# Patient Record
Sex: FEMALE | Race: BLACK OR AFRICAN AMERICAN | Employment: FULL TIME | URBAN - METROPOLITAN AREA
[De-identification: names, ages, dates, MRNs, and addresses within clinical notes are randomized per-mention and may not be internally consistent; named-entity substitution may affect disease eponyms.]

---

## 2017-03-17 ENCOUNTER — HOSPITAL ENCOUNTER (EMERGENCY)
Age: 52
Discharge: HOME | End: 2017-03-17
Attending: EMERGENCY MEDICINE
Payer: MEDICARE

## 2017-03-17 VITALS
HEART RATE: 74 BPM | TEMPERATURE: 97.7 F | DIASTOLIC BLOOD PRESSURE: 100 MMHG | BODY MASS INDEX: 29.19 KG/M2 | SYSTOLIC BLOOD PRESSURE: 158 MMHG | HEIGHT: 65 IN | OXYGEN SATURATION: 100 % | WEIGHT: 175.19 LBS | RESPIRATION RATE: 18 BRPM

## 2017-03-17 DIAGNOSIS — K02.9 DENTAL CARIES: Primary | ICD-10-CM

## 2017-03-17 PROCEDURE — 99282 EMERGENCY DEPT VISIT SF MDM: CPT

## 2017-03-17 RX ORDER — LISINOPRIL 10 MG/1
10 TABLET ORAL DAILY
COMMUNITY
End: 2018-08-31 | Stop reason: ALTCHOICE

## 2017-03-17 RX ORDER — PENICILLIN V POTASSIUM 500 MG/1
500 TABLET ORAL 4 TIMES DAILY
Qty: 40 TABLET | Refills: 0 | Status: SHIPPED | OUTPATIENT
Start: 2017-03-17 | End: 2018-08-22

## 2017-03-17 RX ORDER — HYDROCODONE BITARTRATE AND ACETAMINOPHEN 5; 325 MG/1; MG/1
1 TABLET ORAL EVERY 8 HOURS PRN
Qty: 10 TABLET | Refills: 0 | Status: SHIPPED | OUTPATIENT
Start: 2017-03-17 | End: 2017-03-24

## 2017-03-17 RX ORDER — IBUPROFEN 600 MG/1
600 TABLET ORAL EVERY 8 HOURS PRN
Qty: 30 TABLET | Refills: 0 | Status: ON HOLD | OUTPATIENT
Start: 2017-03-17 | End: 2019-02-21 | Stop reason: ALTCHOICE

## 2017-03-17 ASSESSMENT — ENCOUNTER SYMPTOMS
GASTROINTESTINAL NEGATIVE: 1
EYES NEGATIVE: 1
RESPIRATORY NEGATIVE: 1
ALLERGIC/IMMUNOLOGIC NEGATIVE: 1

## 2017-03-17 ASSESSMENT — PAIN DESCRIPTION - ORIENTATION: ORIENTATION: LEFT;LOWER

## 2017-03-17 ASSESSMENT — PAIN SCALES - GENERAL: PAINLEVEL_OUTOF10: 8

## 2017-03-17 ASSESSMENT — PAIN DESCRIPTION - PAIN TYPE: TYPE: ACUTE PAIN

## 2017-03-17 ASSESSMENT — PAIN DESCRIPTION - LOCATION: LOCATION: HEAD;JAW

## 2017-03-17 ASSESSMENT — PAIN DESCRIPTION - DESCRIPTORS: DESCRIPTORS: THROBBING;ACHING

## 2018-01-28 ENCOUNTER — APPOINTMENT (OUTPATIENT)
Dept: GENERAL RADIOLOGY | Age: 53
End: 2018-01-28
Payer: COMMERCIAL

## 2018-01-28 ENCOUNTER — HOSPITAL ENCOUNTER (OUTPATIENT)
Age: 53
Setting detail: OBSERVATION
Discharge: HOME | End: 2018-01-29
Attending: EMERGENCY MEDICINE | Admitting: EMERGENCY MEDICINE
Payer: COMMERCIAL

## 2018-01-28 DIAGNOSIS — R19.7 NAUSEA VOMITING AND DIARRHEA: Primary | ICD-10-CM

## 2018-01-28 DIAGNOSIS — R55 SYNCOPE AND COLLAPSE: ICD-10-CM

## 2018-01-28 DIAGNOSIS — R11.2 NAUSEA VOMITING AND DIARRHEA: Primary | ICD-10-CM

## 2018-01-28 DIAGNOSIS — E83.39 HYPOPHOSPHATEMIA: ICD-10-CM

## 2018-01-28 LAB
ALBUMIN SERPL-MCNC: 4.2 G/DL (ref 3.5–5.2)
ALBUMIN/GLOBULIN RATIO: 1 (ref 1–2.5)
ALP BLD-CCNC: 143 U/L (ref 35–104)
ALT SERPL-CCNC: 25 U/L (ref 5–33)
ANION GAP SERPL CALCULATED.3IONS-SCNC: 13 MMOL/L (ref 9–17)
AST SERPL-CCNC: 28 U/L
BILIRUB SERPL-MCNC: 0.73 MG/DL (ref 0.3–1.2)
BUN BLDV-MCNC: 9 MG/DL (ref 6–20)
BUN/CREAT BLD: ABNORMAL (ref 9–20)
CALCIUM SERPL-MCNC: 10.2 MG/DL (ref 8.6–10.4)
CHLORIDE BLD-SCNC: 99 MMOL/L (ref 98–107)
CO2: 25 MMOL/L (ref 20–31)
CREAT SERPL-MCNC: 1.11 MG/DL (ref 0.5–0.9)
DIRECT EXAM: NORMAL
GFR AFRICAN AMERICAN: >60 ML/MIN
GFR NON-AFRICAN AMERICAN: 52 ML/MIN
GFR SERPL CREATININE-BSD FRML MDRD: ABNORMAL ML/MIN/{1.73_M2}
GFR SERPL CREATININE-BSD FRML MDRD: ABNORMAL ML/MIN/{1.73_M2}
GLUCOSE BLD-MCNC: 96 MG/DL (ref 70–99)
HCT VFR BLD CALC: 42.6 % (ref 36.3–47.1)
HEMOGLOBIN: 13.4 G/DL (ref 11.9–15.1)
Lab: NORMAL
MAGNESIUM: 1.9 MG/DL (ref 1.6–2.6)
MCH RBC QN AUTO: 29.4 PG (ref 25.2–33.5)
MCHC RBC AUTO-ENTMCNC: 31.5 G/DL (ref 28.4–34.8)
MCV RBC AUTO: 93.4 FL (ref 82.6–102.9)
NRBC AUTOMATED: 0 PER 100 WBC
PDW BLD-RTO: 11.1 % (ref 11.8–14.4)
PHOSPHORUS: 1.6 MG/DL (ref 2.6–4.5)
PLATELET # BLD: ABNORMAL K/UL (ref 138–453)
PLATELET, FLUORESCENCE: 126 K/UL (ref 138–453)
PLATELET, IMMATURE FRACTION: 11.8 % (ref 1.1–10.3)
PMV BLD AUTO: ABNORMAL FL (ref 8.1–13.5)
POC TROPONIN I: 0 NG/ML (ref 0–0.1)
POC TROPONIN INTERP: NORMAL
POTASSIUM SERPL-SCNC: 3.9 MMOL/L (ref 3.7–5.3)
RBC # BLD: 4.56 M/UL (ref 3.95–5.11)
SODIUM BLD-SCNC: 137 MMOL/L (ref 135–144)
SPECIMEN DESCRIPTION: NORMAL
STATUS: NORMAL
TOTAL PROTEIN: 8.3 G/DL (ref 6.4–8.3)
TSH SERPL DL<=0.05 MIU/L-ACNC: 0.38 MIU/L (ref 0.3–5)
WBC # BLD: 4.6 K/UL (ref 3.5–11.3)

## 2018-01-28 PROCEDURE — 71046 X-RAY EXAM CHEST 2 VIEWS: CPT

## 2018-01-28 PROCEDURE — 6360000002 HC RX W HCPCS: Performed by: EMERGENCY MEDICINE

## 2018-01-28 PROCEDURE — 85055 RETICULATED PLATELET ASSAY: CPT

## 2018-01-28 PROCEDURE — 2500000003 HC RX 250 WO HCPCS: Performed by: EMERGENCY MEDICINE

## 2018-01-28 PROCEDURE — 87804 INFLUENZA ASSAY W/OPTIC: CPT

## 2018-01-28 PROCEDURE — 83735 ASSAY OF MAGNESIUM: CPT

## 2018-01-28 PROCEDURE — 6370000000 HC RX 637 (ALT 250 FOR IP): Performed by: EMERGENCY MEDICINE

## 2018-01-28 PROCEDURE — 84484 ASSAY OF TROPONIN QUANT: CPT

## 2018-01-28 PROCEDURE — 96365 THER/PROPH/DIAG IV INF INIT: CPT

## 2018-01-28 PROCEDURE — G0378 HOSPITAL OBSERVATION PER HR: HCPCS

## 2018-01-28 PROCEDURE — 84443 ASSAY THYROID STIM HORMONE: CPT

## 2018-01-28 PROCEDURE — 2580000003 HC RX 258: Performed by: EMERGENCY MEDICINE

## 2018-01-28 PROCEDURE — 96375 TX/PRO/DX INJ NEW DRUG ADDON: CPT

## 2018-01-28 PROCEDURE — 85027 COMPLETE CBC AUTOMATED: CPT

## 2018-01-28 PROCEDURE — 84100 ASSAY OF PHOSPHORUS: CPT

## 2018-01-28 PROCEDURE — 94770 HC ETCO2 MONITOR DAILY: CPT

## 2018-01-28 PROCEDURE — 93005 ELECTROCARDIOGRAM TRACING: CPT

## 2018-01-28 PROCEDURE — 96367 TX/PROPH/DG ADDL SEQ IV INF: CPT

## 2018-01-28 PROCEDURE — 99285 EMERGENCY DEPT VISIT HI MDM: CPT

## 2018-01-28 PROCEDURE — 80053 COMPREHEN METABOLIC PANEL: CPT

## 2018-01-28 PROCEDURE — 96366 THER/PROPH/DIAG IV INF ADDON: CPT

## 2018-01-28 RX ORDER — SODIUM CHLORIDE 0.9 % (FLUSH) 0.9 %
10 SYRINGE (ML) INJECTION PRN
Status: DISCONTINUED | OUTPATIENT
Start: 2018-01-28 | End: 2018-01-29 | Stop reason: HOSPADM

## 2018-01-28 RX ORDER — 0.9 % SODIUM CHLORIDE 0.9 %
1000 INTRAVENOUS SOLUTION INTRAVENOUS ONCE
Status: COMPLETED | OUTPATIENT
Start: 2018-01-28 | End: 2018-01-28

## 2018-01-28 RX ORDER — AMLODIPINE BESYLATE 5 MG/1
5 TABLET ORAL 2 TIMES DAILY
Status: DISCONTINUED | OUTPATIENT
Start: 2018-01-28 | End: 2018-01-29 | Stop reason: HOSPADM

## 2018-01-28 RX ORDER — SODIUM CHLORIDE 9 MG/ML
INJECTION, SOLUTION INTRAVENOUS CONTINUOUS
Status: DISCONTINUED | OUTPATIENT
Start: 2018-01-28 | End: 2018-01-29 | Stop reason: HOSPADM

## 2018-01-28 RX ORDER — ONDANSETRON 2 MG/ML
4 INJECTION INTRAMUSCULAR; INTRAVENOUS ONCE
Status: COMPLETED | OUTPATIENT
Start: 2018-01-28 | End: 2018-01-28

## 2018-01-28 RX ORDER — BUTALBITAL, ACETAMINOPHEN AND CAFFEINE 50; 325; 40 MG/1; MG/1; MG/1
1 TABLET ORAL ONCE
Status: COMPLETED | OUTPATIENT
Start: 2018-01-28 | End: 2018-01-28

## 2018-01-28 RX ORDER — ACETAMINOPHEN 325 MG/1
650 TABLET ORAL EVERY 4 HOURS PRN
Status: DISCONTINUED | OUTPATIENT
Start: 2018-01-28 | End: 2018-01-29 | Stop reason: HOSPADM

## 2018-01-28 RX ORDER — LISINOPRIL 10 MG/1
10 TABLET ORAL DAILY
Status: DISCONTINUED | OUTPATIENT
Start: 2018-01-28 | End: 2018-01-29 | Stop reason: HOSPADM

## 2018-01-28 RX ORDER — SODIUM CHLORIDE 0.9 % (FLUSH) 0.9 %
10 SYRINGE (ML) INJECTION EVERY 12 HOURS SCHEDULED
Status: DISCONTINUED | OUTPATIENT
Start: 2018-01-28 | End: 2018-01-29 | Stop reason: HOSPADM

## 2018-01-28 RX ORDER — BUTALBITAL, ACETAMINOPHEN AND CAFFEINE 50; 325; 40 MG/1; MG/1; MG/1
1 TABLET ORAL EVERY 6 HOURS PRN
Status: DISCONTINUED | OUTPATIENT
Start: 2018-01-28 | End: 2018-01-29 | Stop reason: HOSPADM

## 2018-01-28 RX ORDER — ONDANSETRON 2 MG/ML
4 INJECTION INTRAMUSCULAR; INTRAVENOUS EVERY 8 HOURS PRN
Status: DISCONTINUED | OUTPATIENT
Start: 2018-01-28 | End: 2018-01-29 | Stop reason: HOSPADM

## 2018-01-28 RX ORDER — MAGNESIUM SULFATE 1 G/100ML
2 INJECTION INTRAVENOUS
Status: DISPENSED | OUTPATIENT
Start: 2018-01-28 | End: 2018-01-28

## 2018-01-28 RX ADMIN — BUTALBITAL, ACETAMINOPHEN, AND CAFFEINE 1 TABLET: 50; 325; 40 TABLET ORAL at 17:57

## 2018-01-28 RX ADMIN — SODIUM CHLORIDE, PRESERVATIVE FREE 10 ML: 5 INJECTION INTRAVENOUS at 21:30

## 2018-01-28 RX ADMIN — MAGNESIUM SULFATE HEPTAHYDRATE 2 G: 1 INJECTION, SOLUTION INTRAVENOUS at 16:57

## 2018-01-28 RX ADMIN — ONDANSETRON 4 MG: 2 INJECTION, SOLUTION INTRAMUSCULAR; INTRAVENOUS at 16:58

## 2018-01-28 RX ADMIN — LISINOPRIL 10 MG: 10 TABLET ORAL at 20:19

## 2018-01-28 RX ADMIN — AMLODIPINE BESYLATE 5 MG: 5 TABLET ORAL at 20:19

## 2018-01-28 RX ADMIN — SODIUM CHLORIDE: 9 INJECTION, SOLUTION INTRAVENOUS at 21:49

## 2018-01-28 RX ADMIN — SODIUM CHLORIDE 1000 ML: 9 INJECTION, SOLUTION INTRAVENOUS at 16:52

## 2018-01-28 RX ADMIN — POTASSIUM PHOSPHATE, MONOBASIC AND POTASSIUM PHOSPHATE, DIBASIC 40 MMOL: 224; 236 INJECTION, SOLUTION, CONCENTRATE INTRAVENOUS at 17:41

## 2018-01-28 RX ADMIN — ACETAMINOPHEN 650 MG: 325 TABLET ORAL at 20:18

## 2018-01-28 ASSESSMENT — ENCOUNTER SYMPTOMS
DIARRHEA: 1
BACK PAIN: 0
SHORTNESS OF BREATH: 0
COUGH: 0
RHINORRHEA: 0
NAUSEA: 1
SORE THROAT: 0
VOMITING: 1
ABDOMINAL PAIN: 0
BLOOD IN STOOL: 0

## 2018-01-28 ASSESSMENT — PAIN SCALES - GENERAL
PAINLEVEL_OUTOF10: 8
PAINLEVEL_OUTOF10: 6
PAINLEVEL_OUTOF10: 6

## 2018-01-28 ASSESSMENT — PAIN DESCRIPTION - PAIN TYPE: TYPE: ACUTE PAIN

## 2018-01-28 ASSESSMENT — PAIN DESCRIPTION - DESCRIPTORS: DESCRIPTORS: HEADACHE

## 2018-01-28 ASSESSMENT — PAIN DESCRIPTION - ORIENTATION: ORIENTATION: LOWER

## 2018-01-28 ASSESSMENT — PAIN DESCRIPTION - LOCATION: LOCATION: BACK;HEAD

## 2018-01-28 NOTE — ED PROVIDER NOTES
Cholecystectomy and Lithotripsy. Social History     Social History    Marital status: Single     Spouse name: N/A    Number of children: N/A    Years of education: N/A     Occupational History    Not on file. Social History Main Topics    Smoking status: Never Smoker    Smokeless tobacco: Not on file    Alcohol use No    Drug use: No    Sexual activity: Not on file     Other Topics Concern    Not on file     Social History Narrative    No narrative on file       History reviewed. No pertinent family history. Allergies:  Sulfa antibiotics    Home Medications:  Prior to Admission medications    Medication Sig Start Date End Date Taking? Authorizing Provider   lisinopril (PRINIVIL;ZESTRIL) 10 MG tablet Take 10 mg by mouth daily Unsure of dose    Historical Provider, MD   penicillin v potassium (VEETID) 500 MG tablet Take 1 tablet by mouth 4 times daily 3/17/17   Atif Sparks MD   ibuprofen (ADVIL;MOTRIN) 600 MG tablet Take 1 tablet by mouth every 8 hours as needed for Pain 3/17/17   Atif Sparks MD   butalbital-acetaminophen-caffeine (FIORICET) -64 MG per tablet Take 1 tablet by mouth every 6 hours as needed for Headaches 12/24/16   Reid Grayson MD   nitrofurantoin, macrocrystal-monohydrate, (MACROBID) 100 MG capsule Take 1 capsule by mouth 2 times daily 12/24/16   Reid Grayson MD   amLODIPine (NORVASC) 5 MG tablet Take 5 mg by mouth 2 times daily. Historical Provider, MD   fluticasone-salmeterol (ADVAIR) 250-50 MCG/DOSE AEPB Inhale 1 puff into the lungs daily. Historical Provider, MD   ondansetron (ZOFRAN ODT) 4 MG disintegrating tablet Take 1 tablet by mouth every 8 hours as needed for Nausea. 8/4/14   Kelley Reinoso MD   dicyclomine (BENTYL) 10 MG capsule Take 1 capsule by mouth every 6 hours as needed (cramps).  8/4/14   Kelley Reinoso MD       REVIEW OF SYSTEMS    (2-9 systems for level 4, 10 or more for level 5)      Review of Systems   Constitutional: Positive for chills and fever. HENT: Negative for congestion, rhinorrhea and sore throat. Respiratory: Negative for cough and shortness of breath. Cardiovascular: Negative for chest pain and leg swelling. Gastrointestinal: Positive for diarrhea, nausea and vomiting. Negative for abdominal pain and blood in stool. Genitourinary: Negative for difficulty urinating, dysuria and hematuria. Musculoskeletal: Positive for myalgias. Negative for back pain and neck pain. Skin: Negative for rash. Neurological: Positive for syncope and light-headedness. Negative for dizziness, seizures, weakness, numbness and headaches. Psychiatric/Behavioral: Negative for agitation and confusion. PHYSICAL EXAM   (up to 7 for level 4, 8 or more for level 5)      INITIAL VITALS:   BP (!) 149/97   Pulse 100   Temp 100.1 °F (37.8 °C) (Oral)   Resp 22   Ht 5' 5\" (1.651 m)   Wt 169 lb (76.7 kg)   SpO2 100%   BMI 28.12 kg/m²     Physical Exam   Constitutional: She is oriented to person, place, and time. She appears well-developed and well-nourished. HENT:   Head: Normocephalic and atraumatic. Eyes: Pupils are equal, round, and reactive to light. Neck: Normal range of motion. Neck supple. No carotid bruit. Cardiovascular: Normal rate, regular rhythm, normal heart sounds and intact distal pulses. No murmur heard. Pulmonary/Chest: Effort normal and breath sounds normal. No respiratory distress. She has no wheezes. She has no rales. She exhibits no tenderness. Abdominal: Soft. She exhibits no distension. There is no tenderness. There is no rebound and no guarding. Abdomen soft, nondistended, nontender. No pulsatile masses palpated. Musculoskeletal: Normal range of motion. She exhibits no edema or tenderness. Neurological: She is alert and oriented to person, place, and time. Alert and oriented x4. Cranial nerves II through XII grossly intact bilaterally.   Muscle strength 5 out of 5 bilateral upper and

## 2018-01-28 NOTE — ED NOTES
Pt transported to [facility], [address] [place] [address]  01/28/18 1588

## 2018-01-28 NOTE — ED NOTES
Pt to ER with c/o N/V/D/fever/chills x 3 days. Pt febrile upon arrival, took Tylenol at 0900 this morning. Pt reports generalized body aches and pain, pain worse in lower back. Pt stated she had a syncopal episode early this morning while vomiting in her bathroom. Denies hitting her head, states she was already lying on the floor. Pt unsure how long she was down, found by son. Placed on full cardiac monitor, no distress noted, rr even and NL. Dr. Danny Caldera at bedside for eval. Will continue to monitor.      Lori Tatum RN  01/28/18 5992

## 2018-01-28 NOTE — ED NOTES
Pt updated with test results and decision to admit. Awaiting room assignment.  Will continue to monitor     Mike Son RN  01/28/18 0273

## 2018-01-29 VITALS
WEIGHT: 171.3 LBS | OXYGEN SATURATION: 97 % | HEART RATE: 80 BPM | DIASTOLIC BLOOD PRESSURE: 76 MMHG | HEIGHT: 65 IN | SYSTOLIC BLOOD PRESSURE: 118 MMHG | BODY MASS INDEX: 28.54 KG/M2 | RESPIRATION RATE: 18 BRPM | TEMPERATURE: 99.7 F

## 2018-01-29 LAB
-: NORMAL
AMORPHOUS: NORMAL
BACTERIA: NORMAL
BILIRUBIN URINE: NEGATIVE
CASTS UA: NORMAL /LPF (ref 0–8)
COLOR: YELLOW
COMMENT UA: ABNORMAL
CRYSTALS, UA: NORMAL /HPF
EKG ATRIAL RATE: 77 BPM
EKG ATRIAL RATE: 87 BPM
EKG ATRIAL RATE: 97 BPM
EKG P AXIS: 47 DEGREES
EKG P AXIS: 59 DEGREES
EKG P AXIS: 59 DEGREES
EKG P-R INTERVAL: 160 MS
EKG P-R INTERVAL: 170 MS
EKG P-R INTERVAL: 172 MS
EKG Q-T INTERVAL: 328 MS
EKG Q-T INTERVAL: 380 MS
EKG Q-T INTERVAL: 398 MS
EKG QRS DURATION: 84 MS
EKG QRS DURATION: 86 MS
EKG QRS DURATION: 86 MS
EKG QTC CALCULATION (BAZETT): 394 MS
EKG QTC CALCULATION (BAZETT): 430 MS
EKG QTC CALCULATION (BAZETT): 505 MS
EKG R AXIS: 17 DEGREES
EKG R AXIS: 22 DEGREES
EKG R AXIS: 7 DEGREES
EKG T AXIS: 25 DEGREES
EKG T AXIS: 36 DEGREES
EKG T AXIS: 8 DEGREES
EKG VENTRICULAR RATE: 77 BPM
EKG VENTRICULAR RATE: 87 BPM
EKG VENTRICULAR RATE: 97 BPM
EPITHELIAL CELLS UA: NORMAL /HPF (ref 0–5)
GLUCOSE URINE: NEGATIVE
KETONES, URINE: NEGATIVE
LEUKOCYTE ESTERASE, URINE: NEGATIVE
LV EF: 55 %
LVEF MODALITY: NORMAL
MUCUS: NORMAL
NITRITE, URINE: NEGATIVE
OTHER OBSERVATIONS UA: NORMAL
PH UA: 7 (ref 5–8)
PROTEIN UA: ABNORMAL
RBC UA: NORMAL /HPF (ref 0–4)
RENAL EPITHELIAL, UA: NORMAL /HPF
SPECIFIC GRAVITY UA: 1.01 (ref 1–1.03)
TRICHOMONAS: NORMAL
TURBIDITY: CLEAR
URINE HGB: NEGATIVE
UROBILINOGEN, URINE: NORMAL
WBC UA: NORMAL /HPF (ref 0–5)
YEAST: NORMAL

## 2018-01-29 PROCEDURE — 93306 TTE W/DOPPLER COMPLETE: CPT

## 2018-01-29 PROCEDURE — 81001 URINALYSIS AUTO W/SCOPE: CPT

## 2018-01-29 PROCEDURE — G0378 HOSPITAL OBSERVATION PER HR: HCPCS

## 2018-01-29 PROCEDURE — 93005 ELECTROCARDIOGRAM TRACING: CPT

## 2018-01-29 PROCEDURE — 6370000000 HC RX 637 (ALT 250 FOR IP): Performed by: EMERGENCY MEDICINE

## 2018-01-29 PROCEDURE — 94762 N-INVAS EAR/PLS OXIMTRY CONT: CPT

## 2018-01-29 RX ORDER — ONDANSETRON 4 MG/1
4 TABLET, ORALLY DISINTEGRATING ORAL EVERY 8 HOURS PRN
Qty: 20 TABLET | Refills: 0 | Status: SHIPPED | OUTPATIENT
Start: 2018-01-29 | End: 2018-08-22

## 2018-01-29 RX ORDER — ACETAMINOPHEN 325 MG/1
650 TABLET ORAL EVERY 6 HOURS PRN
Qty: 120 TABLET | Refills: 0 | Status: SHIPPED | OUTPATIENT
Start: 2018-01-29 | End: 2018-08-22

## 2018-01-29 RX ADMIN — AMLODIPINE BESYLATE 5 MG: 5 TABLET ORAL at 11:14

## 2018-01-29 RX ADMIN — LISINOPRIL 10 MG: 10 TABLET ORAL at 11:15

## 2018-01-29 ASSESSMENT — PAIN SCALES - GENERAL: PAINLEVEL_OUTOF10: 0

## 2018-01-29 NOTE — H&P
[facility]  CDU / OBSERVATION eNCOUnter  Resident Note     Pt Name: Marychuy Barrett  MRN: [id number]  [place] 1965  Date of evaluation: 1/29/18  Patient's PCP is :  Sindy Nice MD    CHIEF COMPLAINT       Chief Complaint   Patient presents with    Loss of Consciousness     N/V/D x 3 days with generalized body aches and pain. Pt reports fever/chills. Pt febrile upon arrival, took Tylenol this morning at 0900. Pt reports passing out while in the bathroom early this morning while vomiting. Pt denies hitting her head, states she was already on the floor. Unsure of how long she was down, found by her son.  Emesis    Diarrhea         HISTORY OF PRESENT ILLNESS    Marychuy Barrett is a 47 y.o. female who presents with acute nausea and vomiting most likely etiology gastroenteritis versus influenza as well as acute loss of consciousness most likely etiology dehydration versus vasovagal syncope. Patient presenting with acute nausea, vomiting, diarrhea and loss of consciousness but has been going on over the past few days. Patient states the emesis and diarrhea are both nonbloody, complaining of fever and chills as well as generalized body aches. Denies any chest pain or shortness of breath. She states she was in the bathroom when she passed out however she was on the floor prior to fainting and states she did not hit her head. Patient had workup in the emergency department consisting of CBC, CMP, EKG, chest x-ray, urinalysis, TSH and was admitted to observation unit for cardiology consultation. Patient stating she is feeling much better today with decreased nausea no vomiting. States her achiness has improved greatly.     Location/Symptom: nausea and vomiting  Timing/Onset: acute  Provocation: none  Quality: nauseous  Radiation: none  Severity: moderate  Timing/Duration: past few days    Modifying Factors: none    REVIEW OF SYSTEMS       General ROS - No fevers, positive malaise she has never smoked. She does not have any smokeless tobacco history on file. She reports that she does not drink alcohol or use drugs. I have reviewed and agree with all Social.  There are no concerns for substance abuse/use. PHYSICAL EXAM     INITIAL VITALS:  height is 5' 5\" (1.651 m) and weight is 171 lb 4.8 oz (77.7 kg). Her oral temperature is 99.7 °F (37.6 °C). Her blood pressure is 118/76 and her pulse is 80. Her respiration is 18 and oxygen saturation is 97%. CONSTITUTIONAL: AOx4, no apparent distress, appears stated age    HEAD: normocephalic, atraumatic   EYES: PERRLA, EOMI    ENT: moist mucous membranes, uvula midline   NECK: supple, symmetric   BACK: symmetric   LUNGS: clear to auscultation bilaterally   CARDIOVASCULAR: regular rate and rhythm, no murmurs, rubs or gallops   ABDOMEN: soft, non-tender, non-distended with normal active bowel sounds   NEUROLOGIC:  MAEx4, no focal sensory or motor deficits   MUSCULOSKELETAL: no clubbing, cyanosis or edema   SKIN: no rash or wounds       DIFFERENTIAL DIAGNOSIS/MDM:     Small bowel obstruction, DKA, Abdominal (gastroenteritis, appendicitis, gallbladder, pancreatitis, PUD, perforation), ICH, meningitis, vertigo, hyperemesis, abnormal lytes, EtOH intoxication/ tox, post-tussive, ACS/ MI    Evaluate for: blood glucose, bloody v bilious, PO tolerant, hydration status, headache, abdominal pain, vertigo, alcohol intoxication, other intoxication, new medications, no signs of head trauma. Reassess for tolerating PO      DIAGNOSTIC RESULTS     EKG: All EKG's are interpreted by the Observation Physician who either signs or Co-signs this chart in the absence of a cardiologist.    EKG Interpretation    Interpreted by observation physician    Rhythm: normal sinus   Rate: normal  Axis: normal  Ectopy: none  Conduction: QTC prolongation  ST Segments: no acute change  T Waves:  Inversion in V3, V4, V5, V6  Q Waves: none     Clinical Impression: T wave inversion in the anterior lateral leads, QTc prolongation     Isidoro Landa MD        RADIOLOGY:   I directly visualized the following  images and reviewed the radiologist interpretations:    Xr Chest Standard (2 Vw)    Result Date: 1/28/2018  EXAMINATION: TWO VIEWS OF THE CHEST 1/28/2018 5:10 pm COMPARISON: 08/28/2014 HISTORY: ORDERING SYSTEM PROVIDED HISTORY: syncope TECHNOLOGIST PROVIDED HISTORY: Reason for exam:->syncope Ordering Physician Provided Reason for Exam: syncope Acuity: Unknown Type of Exam: Unknown FINDINGS: The lungs are without acute focal process. There is no effusion or pneumothorax. The cardiomediastinal silhouette is stable. The osseous structures are stable. No acute process. LABS:  I have reviewed and interpreted all available lab results. Labs Reviewed   CBC - Abnormal; Notable for the following:        Result Value    RDW 11.1 (*)     All other components within normal limits   COMPREHENSIVE METABOLIC PANEL - Abnormal; Notable for the following:     CREATININE 1.11 (*)     Alkaline Phosphatase 143 (*)     GFR Non- 52 (*)     All other components within normal limits   PHOSPHORUS - Abnormal; Notable for the following:     Phosphorus 1.6 (*)     All other components within normal limits   URINALYSIS - Abnormal; Notable for the following:     Protein, UA 2+ (*)     All other components within normal limits   IMMATURE PLATELET FRACTION - Abnormal; Notable for the following:     Platelet, Immature Fraction 11.8 (*)     Platelet, Fluorescence 126 (*)     All other components within normal limits   RAPID INFLUENZA A/B ANTIGENS   MAGNESIUM   TSH WITH REFLEX   MICROSCOPIC URINALYSIS   POCT TROPONIN   POCT TROPONIN   POCT TROPONIN           SCREENING TOOLS:      CDU IMPRESSION       Trina Knowles is a 47 y.o. female who presents with    1. Acute nausea and vomiting and diarrhea most likely etiology gastroenteritis versus influenza  2.  Acute loss of consciousness most likely etiology dehydration versus vasovagal syncope         CDU PLAN     · Acute nausea, vomiting, diarrhea: IV fluids, antiemetics, patient stating she is feeling much better today without any vomiting and she was able to tolerate by mouth intake  · Acute loss of consciousness: Cardiology recommending echocardiogram  · Discharge today if negative echocardiogram and cardiology clearance  IP CONSULT TO CARDIOLOGY  · Further workup and evaluation   · Follow up recommendations     · Continue home meds, pain control   · Monitor vitals, labs, imaging         CONSULTS:    IP CONSULT TO CARDIOLOGY    PROCEDURES:  Not indicated       PATIENT REFERRED TO:    Chery Ruelas, [address] [place] [id number] [phone]    Schedule an appointment as soon as possible for a visit in 3 days      Calin Flores, [facility]  [phone]            --  Breana Gee MD   Emergency Medicine Resident     This dictation was generated by voice recognition computer software. Although all attempts are made to edit the dictation for accuracy, there may be errors in the transcription that are not intended.

## 2018-01-29 NOTE — CONSULTS
mucosa  Respiratory:  · Normal excursion and expansion without use of accessory muscles  · Resp Auscultation: Good respiratory effort. No for increased work of breathing. On auscultation: clear  Cardiovascular:  · The apical impulse is not displaced  · Heart tones are crisp and normal. regular S1 and S2. Murmurs: none  · Jugular venous pulsation Normal  · The carotid upstroke is normal in amplitude and contour without delay or bruit  · Peripheral pulses are symmetrical and full   Abdomen:  · No masses or tenderness  · Bowel sounds present  Extremities:  ·  No Cyanosis or Clubbing  ·  Lower extremity edema: none  ·  Skin: Warm and dry  Neurological:  · Alert and oriented. · Moves all extremities well  · No abnormalities of mood, affect, memory, mentation, or behavior are noted    DATA:    Diagnostics:      EKG: normal sinus no acute ST-T changes there was some nonspecific changes seen      Labs:     CBC:   Recent Labs      01/28/18   1618   WBC  4.6   HGB  13.4   HCT  42.6   PLT  See Reflexed IPF Result     BMP:   Recent Labs      01/28/18   1618   NA  137   K  3.9   CO2  25   BUN  9   CREATININE  1.11*   LABGLOM  52*   GLUCOSE  96     BNP: No results for input(s): BNP in the last 72 hours. PT/INR: No results for input(s): PROTIME, INR in the last 72 hours. APTT:No results for input(s): APTT in the last 72 hours. CARDIAC ENZYMES:  Recent Labs      01/28/18   1614   TROPONINI  0.00      FASTING LIPID PANEL:No results found for: HDL, LDLDIRECT, LDLCALC, TRIG  LIVER PROFILE:  Recent Labs      01/28/18   1618   AST  28   ALT  25   LABALBU  4.2         IMPRESSION:    Patient Active Problem List   Diagnosis    Calculus (=stone)    HTN (hypertension)    Asthma    Syncope and collapse     - syncope likely vasovagal due to recurrent vomiting  - recurrent nausea and vomiting has improved.   Workup per primary  - History of hypertension, asthma    RECOMMENDATIONS:  - we will check 2-D echo to advise for any structural

## 2018-04-09 ENCOUNTER — APPOINTMENT (OUTPATIENT)
Dept: GENERAL RADIOLOGY | Age: 53
End: 2018-04-09
Payer: MEDICARE

## 2018-04-09 ENCOUNTER — HOSPITAL ENCOUNTER (EMERGENCY)
Age: 53
Discharge: HOME | End: 2018-04-09
Attending: EMERGENCY MEDICINE
Payer: MEDICARE

## 2018-04-09 VITALS
HEART RATE: 110 BPM | TEMPERATURE: 99.3 F | RESPIRATION RATE: 18 BRPM | SYSTOLIC BLOOD PRESSURE: 157 MMHG | DIASTOLIC BLOOD PRESSURE: 101 MMHG | BODY MASS INDEX: 28.16 KG/M2 | OXYGEN SATURATION: 98 % | WEIGHT: 169 LBS | HEIGHT: 65 IN

## 2018-04-09 DIAGNOSIS — J06.9 ACUTE UPPER RESPIRATORY INFECTION: Primary | ICD-10-CM

## 2018-04-09 LAB
DIRECT EXAM: NORMAL
Lab: NORMAL
SPECIMEN DESCRIPTION: NORMAL
STATUS: NORMAL

## 2018-04-09 PROCEDURE — 99285 EMERGENCY DEPT VISIT HI MDM: CPT

## 2018-04-09 PROCEDURE — 87804 INFLUENZA ASSAY W/OPTIC: CPT

## 2018-04-09 PROCEDURE — 6360000002 HC RX W HCPCS: Performed by: RADIOLOGY

## 2018-04-09 PROCEDURE — 71046 X-RAY EXAM CHEST 2 VIEWS: CPT

## 2018-04-09 PROCEDURE — 94640 AIRWAY INHALATION TREATMENT: CPT

## 2018-04-09 RX ORDER — ALBUTEROL SULFATE 90 UG/1
2 AEROSOL, METERED RESPIRATORY (INHALATION) EVERY 6 HOURS PRN
Qty: 1 INHALER | Refills: 3 | Status: SHIPPED | OUTPATIENT
Start: 2018-04-09 | End: 2018-08-31 | Stop reason: ALTCHOICE

## 2018-04-09 RX ORDER — PREDNISONE 20 MG/1
40 TABLET ORAL DAILY
Qty: 10 TABLET | Refills: 0 | Status: SHIPPED | OUTPATIENT
Start: 2018-04-09 | End: 2018-04-14

## 2018-04-09 RX ORDER — ALBUTEROL SULFATE 2.5 MG/3ML
2.5 SOLUTION RESPIRATORY (INHALATION) EVERY 6 HOURS PRN
Status: DISCONTINUED | OUTPATIENT
Start: 2018-04-09 | End: 2018-04-09 | Stop reason: HOSPADM

## 2018-04-09 RX ADMIN — ALBUTEROL SULFATE 2.5 MG: 2.5 SOLUTION RESPIRATORY (INHALATION) at 17:57

## 2018-04-09 ASSESSMENT — ENCOUNTER SYMPTOMS
ABDOMINAL PAIN: 0
SHORTNESS OF BREATH: 1
EYE DISCHARGE: 0
DIARRHEA: 0
VOMITING: 0
CONSTIPATION: 0
SINUS PRESSURE: 1
PHOTOPHOBIA: 0
ABDOMINAL DISTENTION: 0
SORE THROAT: 1
NAUSEA: 1
RHINORRHEA: 1
TROUBLE SWALLOWING: 0
COUGH: 1
WHEEZING: 0

## 2018-04-09 ASSESSMENT — PAIN DESCRIPTION - LOCATION: LOCATION: CHEST

## 2018-04-09 ASSESSMENT — PAIN DESCRIPTION - PAIN TYPE: TYPE: ACUTE PAIN

## 2018-04-09 ASSESSMENT — PAIN SCALES - GENERAL: PAINLEVEL_OUTOF10: 3

## 2018-07-26 PROBLEM — Z12.11 COLON CANCER SCREENING: Status: ACTIVE | Noted: 2018-03-13

## 2018-08-22 ENCOUNTER — APPOINTMENT (OUTPATIENT)
Dept: GENERAL RADIOLOGY | Age: 53
DRG: 046 | End: 2018-08-22
Payer: MEDICARE

## 2018-08-22 ENCOUNTER — APPOINTMENT (OUTPATIENT)
Dept: CT IMAGING | Age: 53
DRG: 046 | End: 2018-08-22
Payer: MEDICARE

## 2018-08-22 ENCOUNTER — HOSPITAL ENCOUNTER (INPATIENT)
Age: 53
LOS: 1 days | Discharge: HOME | DRG: 046 | End: 2018-08-23
Attending: EMERGENCY MEDICINE | Admitting: PSYCHIATRY & NEUROLOGY
Payer: MEDICARE

## 2018-08-22 ENCOUNTER — APPOINTMENT (OUTPATIENT)
Dept: MRI IMAGING | Age: 53
DRG: 046 | End: 2018-08-22
Payer: MEDICARE

## 2018-08-22 DIAGNOSIS — R20.0 NUMBNESS: Primary | ICD-10-CM

## 2018-08-22 PROBLEM — I63.9 ISCHEMIC STROKE (HCC): Status: ACTIVE | Noted: 2018-08-22

## 2018-08-22 LAB
% CKMB: 1.1 % (ref 0–3)
ABSOLUTE EOS #: 0.1 K/UL (ref 0–0.44)
ABSOLUTE IMMATURE GRANULOCYTE: <0.03 K/UL (ref 0–0.3)
ABSOLUTE LYMPH #: 1.83 K/UL (ref 1.1–3.7)
ABSOLUTE MONO #: 0.35 K/UL (ref 0.1–1.2)
ANION GAP SERPL CALCULATED.3IONS-SCNC: 14 MMOL/L (ref 9–17)
BASOPHILS # BLD: 1 % (ref 0–2)
BASOPHILS ABSOLUTE: 0.05 K/UL (ref 0–0.2)
BUN BLDV-MCNC: 9 MG/DL (ref 6–20)
BUN/CREAT BLD: ABNORMAL (ref 9–20)
CALCIUM SERPL-MCNC: 10 MG/DL (ref 8.6–10.4)
CHLORIDE BLD-SCNC: 104 MMOL/L (ref 98–107)
CK MB: <1 NG/ML
CKMB INTERPRETATION: ABNORMAL
CO2: 21 MMOL/L (ref 20–31)
CREAT SERPL-MCNC: 1.13 MG/DL (ref 0.5–0.9)
DIFFERENTIAL TYPE: ABNORMAL
EKG ATRIAL RATE: 72 BPM
EKG P AXIS: 47 DEGREES
EKG P-R INTERVAL: 180 MS
EKG Q-T INTERVAL: 380 MS
EKG QRS DURATION: 86 MS
EKG QTC CALCULATION (BAZETT): 416 MS
EKG R AXIS: -6 DEGREES
EKG T AXIS: -17 DEGREES
EKG VENTRICULAR RATE: 72 BPM
EOSINOPHILS RELATIVE PERCENT: 2 % (ref 1–4)
ESTIMATED AVERAGE GLUCOSE: 91 MG/DL
GFR AFRICAN AMERICAN: >60 ML/MIN
GFR NON-AFRICAN AMERICAN: 51 ML/MIN
GFR SERPL CREATININE-BSD FRML MDRD: ABNORMAL ML/MIN/{1.73_M2}
GFR SERPL CREATININE-BSD FRML MDRD: ABNORMAL ML/MIN/{1.73_M2}
GLUCOSE BLD-MCNC: 115 MG/DL (ref 70–99)
HBA1C MFR BLD: 4.8 % (ref 4–6)
HCT VFR BLD CALC: 39.7 % (ref 36.3–47.1)
HEMOGLOBIN: 12.6 G/DL (ref 11.9–15.1)
IMMATURE GRANULOCYTES: 0 %
INR BLD: 1
LYMPHOCYTES # BLD: 44 % (ref 24–43)
MCH RBC QN AUTO: 29 PG (ref 25.2–33.5)
MCHC RBC AUTO-ENTMCNC: 31.7 G/DL (ref 28.4–34.8)
MCV RBC AUTO: 91.5 FL (ref 82.6–102.9)
MONOCYTES # BLD: 9 % (ref 3–12)
MYOGLOBIN: 29 NG/ML (ref 25–58)
NRBC AUTOMATED: 0 PER 100 WBC
PARTIAL THROMBOPLASTIN TIME: 26.5 SEC (ref 20.5–30.5)
PDW BLD-RTO: 11.4 % (ref 11.8–14.4)
PLATELET # BLD: 183 K/UL (ref 138–453)
PLATELET ESTIMATE: ABNORMAL
PMV BLD AUTO: 12.6 FL (ref 8.1–13.5)
POC TROPONIN I: 0 NG/ML (ref 0–0.1)
POC TROPONIN I: 0 NG/ML (ref 0–0.1)
POC TROPONIN INTERP: NORMAL
POC TROPONIN INTERP: NORMAL
POTASSIUM SERPL-SCNC: 3.6 MMOL/L (ref 3.7–5.3)
PROTHROMBIN TIME: 10.3 SEC (ref 9–12)
RBC # BLD: 4.34 M/UL (ref 3.95–5.11)
RBC # BLD: ABNORMAL 10*6/UL
SEG NEUTROPHILS: 44 % (ref 36–65)
SEGMENTED NEUTROPHILS ABSOLUTE COUNT: 1.8 K/UL (ref 1.5–8.1)
SODIUM BLD-SCNC: 139 MMOL/L (ref 135–144)
TOTAL CK: 92 U/L (ref 26–192)
TROPONIN INTERP: ABNORMAL
TROPONIN T: <0.03 NG/ML
WBC # BLD: 4.1 K/UL (ref 3.5–11.3)
WBC # BLD: ABNORMAL 10*3/UL

## 2018-08-22 PROCEDURE — 2060000000 HC ICU INTERMEDIATE R&B

## 2018-08-22 PROCEDURE — 93005 ELECTROCARDIOGRAM TRACING: CPT

## 2018-08-22 PROCEDURE — 6370000000 HC RX 637 (ALT 250 FOR IP): Performed by: STUDENT IN AN ORGANIZED HEALTH CARE EDUCATION/TRAINING PROGRAM

## 2018-08-22 PROCEDURE — A9576 INJ PROHANCE MULTIPACK: HCPCS | Performed by: PSYCHIATRY & NEUROLOGY

## 2018-08-22 PROCEDURE — 82550 ASSAY OF CK (CPK): CPT

## 2018-08-22 PROCEDURE — 80048 BASIC METABOLIC PNL TOTAL CA: CPT

## 2018-08-22 PROCEDURE — 6370000000 HC RX 637 (ALT 250 FOR IP): Performed by: INTERNAL MEDICINE

## 2018-08-22 PROCEDURE — 99223 1ST HOSP IP/OBS HIGH 75: CPT | Performed by: PSYCHIATRY & NEUROLOGY

## 2018-08-22 PROCEDURE — 83036 HEMOGLOBIN GLYCOSYLATED A1C: CPT

## 2018-08-22 PROCEDURE — 85610 PROTHROMBIN TIME: CPT

## 2018-08-22 PROCEDURE — G9174 SPEECH LANG CURRENT STATUS: HCPCS

## 2018-08-22 PROCEDURE — 83874 ASSAY OF MYOGLOBIN: CPT

## 2018-08-22 PROCEDURE — G9176 SPEECH LANG D/C STATUS: HCPCS

## 2018-08-22 PROCEDURE — 71046 X-RAY EXAM CHEST 2 VIEWS: CPT

## 2018-08-22 PROCEDURE — 6360000002 HC RX W HCPCS: Performed by: STUDENT IN AN ORGANIZED HEALTH CARE EDUCATION/TRAINING PROGRAM

## 2018-08-22 PROCEDURE — 70496 CT ANGIOGRAPHY HEAD: CPT

## 2018-08-22 PROCEDURE — 72156 MRI NECK SPINE W/O & W/DYE: CPT

## 2018-08-22 PROCEDURE — 2580000003 HC RX 258: Performed by: STUDENT IN AN ORGANIZED HEALTH CARE EDUCATION/TRAINING PROGRAM

## 2018-08-22 PROCEDURE — 70553 MRI BRAIN STEM W/O & W/DYE: CPT

## 2018-08-22 PROCEDURE — 85730 THROMBOPLASTIN TIME PARTIAL: CPT

## 2018-08-22 PROCEDURE — 92523 SPEECH SOUND LANG COMPREHEN: CPT

## 2018-08-22 PROCEDURE — 70450 CT HEAD/BRAIN W/O DYE: CPT

## 2018-08-22 PROCEDURE — 6370000000 HC RX 637 (ALT 250 FOR IP): Performed by: EMERGENCY MEDICINE

## 2018-08-22 PROCEDURE — 99285 EMERGENCY DEPT VISIT HI MDM: CPT

## 2018-08-22 PROCEDURE — 82553 CREATINE MB FRACTION: CPT

## 2018-08-22 PROCEDURE — 80061 LIPID PANEL: CPT

## 2018-08-22 PROCEDURE — 70498 CT ANGIOGRAPHY NECK: CPT

## 2018-08-22 PROCEDURE — 6360000004 HC RX CONTRAST MEDICATION: Performed by: PSYCHIATRY & NEUROLOGY

## 2018-08-22 PROCEDURE — 85025 COMPLETE CBC W/AUTO DIFF WBC: CPT

## 2018-08-22 PROCEDURE — G9175 SPEECH LANG GOAL STATUS: HCPCS

## 2018-08-22 PROCEDURE — 6360000004 HC RX CONTRAST MEDICATION: Performed by: STUDENT IN AN ORGANIZED HEALTH CARE EDUCATION/TRAINING PROGRAM

## 2018-08-22 PROCEDURE — 84484 ASSAY OF TROPONIN QUANT: CPT

## 2018-08-22 PROCEDURE — 99254 IP/OBS CNSLTJ NEW/EST MOD 60: CPT | Performed by: PSYCHIATRY & NEUROLOGY

## 2018-08-22 RX ORDER — ONDANSETRON 2 MG/ML
4 INJECTION INTRAMUSCULAR; INTRAVENOUS EVERY 6 HOURS PRN
Status: DISCONTINUED | OUTPATIENT
Start: 2018-08-22 | End: 2018-08-23 | Stop reason: HOSPADM

## 2018-08-22 RX ORDER — ATORVASTATIN CALCIUM 40 MG/1
40 TABLET, FILM COATED ORAL NIGHTLY
Status: DISCONTINUED | OUTPATIENT
Start: 2018-08-22 | End: 2018-08-23 | Stop reason: HOSPADM

## 2018-08-22 RX ORDER — ASPIRIN 81 MG/1
81 TABLET ORAL DAILY
Status: DISCONTINUED | OUTPATIENT
Start: 2018-08-23 | End: 2018-08-23 | Stop reason: HOSPADM

## 2018-08-22 RX ORDER — UREA 10 %
1 LOTION (ML) TOPICAL NIGHTLY
Status: DISCONTINUED | OUTPATIENT
Start: 2018-08-22 | End: 2018-08-23 | Stop reason: HOSPADM

## 2018-08-22 RX ORDER — CLOPIDOGREL BISULFATE 75 MG/1
75 TABLET ORAL DAILY
Status: DISCONTINUED | OUTPATIENT
Start: 2018-08-23 | End: 2018-08-23 | Stop reason: HOSPADM

## 2018-08-22 RX ORDER — CLOPIDOGREL 300 MG/1
300 TABLET, FILM COATED ORAL DAILY
Status: DISCONTINUED | OUTPATIENT
Start: 2018-08-22 | End: 2018-08-22

## 2018-08-22 RX ORDER — 0.9 % SODIUM CHLORIDE 0.9 %
1000 INTRAVENOUS SOLUTION INTRAVENOUS ONCE
Status: COMPLETED | OUTPATIENT
Start: 2018-08-22 | End: 2018-08-22

## 2018-08-22 RX ORDER — SODIUM CHLORIDE 0.9 % (FLUSH) 0.9 %
10 SYRINGE (ML) INJECTION EVERY 12 HOURS SCHEDULED
Status: DISCONTINUED | OUTPATIENT
Start: 2018-08-22 | End: 2018-08-23 | Stop reason: HOSPADM

## 2018-08-22 RX ORDER — SODIUM CHLORIDE 9 MG/ML
INJECTION, SOLUTION INTRAVENOUS CONTINUOUS
Status: DISCONTINUED | OUTPATIENT
Start: 2018-08-22 | End: 2018-08-23 | Stop reason: HOSPADM

## 2018-08-22 RX ORDER — ALBUTEROL SULFATE 90 UG/1
2 AEROSOL, METERED RESPIRATORY (INHALATION) EVERY 6 HOURS PRN
Status: DISCONTINUED | OUTPATIENT
Start: 2018-08-22 | End: 2018-08-23 | Stop reason: HOSPADM

## 2018-08-22 RX ORDER — SODIUM CHLORIDE 0.9 % (FLUSH) 0.9 %
10 SYRINGE (ML) INJECTION PRN
Status: DISCONTINUED | OUTPATIENT
Start: 2018-08-22 | End: 2018-08-23 | Stop reason: HOSPADM

## 2018-08-22 RX ORDER — LORAZEPAM 0.5 MG/1
1 TABLET ORAL ONCE
Status: COMPLETED | OUTPATIENT
Start: 2018-08-22 | End: 2018-08-22

## 2018-08-22 RX ORDER — CHLORHEXIDINE GLUCONATE 0.12 MG/ML
15 RINSE ORAL 2 TIMES DAILY
Status: DISCONTINUED | OUTPATIENT
Start: 2018-08-22 | End: 2018-08-23 | Stop reason: HOSPADM

## 2018-08-22 RX ORDER — CLOPIDOGREL 300 MG/1
300 TABLET, FILM COATED ORAL ONCE
Status: COMPLETED | OUTPATIENT
Start: 2018-08-22 | End: 2018-08-22

## 2018-08-22 RX ADMIN — CHLORHEXIDINE GLUCONATE 0.12% ORAL RINSE 15 ML: 1.2 LIQUID ORAL at 15:03

## 2018-08-22 RX ADMIN — GADOTERIDOL 15 ML: 279.3 INJECTION, SOLUTION INTRAVENOUS at 16:36

## 2018-08-22 RX ADMIN — Medication 1 MG: at 22:24

## 2018-08-22 RX ADMIN — IOPAMIDOL 90 ML: 755 INJECTION, SOLUTION INTRAVENOUS at 10:45

## 2018-08-22 RX ADMIN — CHLORHEXIDINE GLUCONATE 0.12% ORAL RINSE 15 ML: 1.2 LIQUID ORAL at 20:59

## 2018-08-22 RX ADMIN — ENOXAPARIN SODIUM 40 MG: 40 INJECTION SUBCUTANEOUS at 15:03

## 2018-08-22 RX ADMIN — ASPIRIN 325 MG: 325 TABLET, COATED ORAL at 12:04

## 2018-08-22 RX ADMIN — DESMOPRESSIN ACETATE 40 MG: 0.2 TABLET ORAL at 20:59

## 2018-08-22 RX ADMIN — LORAZEPAM 1 MG: 0.5 TABLET ORAL at 15:09

## 2018-08-22 RX ADMIN — SODIUM CHLORIDE: 9 INJECTION, SOLUTION INTRAVENOUS at 14:58

## 2018-08-22 RX ADMIN — CLOPIDOGREL 300 MG: 300 TABLET, FILM COATED ORAL at 12:18

## 2018-08-22 RX ADMIN — SODIUM CHLORIDE 1000 ML: 9 INJECTION, SOLUTION INTRAVENOUS at 10:31

## 2018-08-22 ASSESSMENT — PAIN SCALES - GENERAL
PAINLEVEL_OUTOF10: 0
PAINLEVEL_OUTOF10: 0

## 2018-08-22 ASSESSMENT — ENCOUNTER SYMPTOMS
ABDOMINAL PAIN: 0
DIARRHEA: 0
EYE DISCHARGE: 0
RHINORRHEA: 0
COUGH: 0
NAUSEA: 0
EYE PAIN: 0
COLOR CHANGE: 0
SHORTNESS OF BREATH: 0
BACK PAIN: 0
VOMITING: 0
SORE THROAT: 0

## 2018-08-22 NOTE — PROGRESS NOTES
Smoking Cessation - topics covered   []  Health Risks  []  Benefits of Quitting   []  Smoking Cessation  [x]  Patient has no history of tobacco use  []  Patient is former smoker. [x]  No need for tobacco cessation education. []  Booklet given  []  Patient verbalizes understanding. []  Patient denies need for tobacco cessation education.   Yolande Pod  1:44 PM

## 2018-08-22 NOTE — H&P
butalbital-acetaminophen-caffeine (FIORICET) -40 MG per tablet Take 1 tablet by mouth every 6 hours as needed for Headaches 16   Oz Echeverria MD   amLODIPine (NORVASC) 5 MG tablet Take 5 mg by mouth 2 times daily. Historical Provider, MD   fluticasone-salmeterol (ADVAIR) 250-50 MCG/DOSE AEPB Inhale 1 puff into the lungs daily. Historical Provider, MD        Allergies:     Sulfa antibiotics    Social History:     Tobacco:    reports that she has never smoked. She has never used smokeless tobacco.  Alcohol:      reports that she does not drink alcohol. Drug Use:  reports that she does not use drugs. Family History:     History reviewed. No pertinent family history. Review of Systems:     ROS:  Constitutional  Negative for fever and chills    HEENT  Negative for ear discharge, ear pain, nosebleed    Eyes  Negative for photophobia, pain and discharge    Respiratory  Negative for hemoptysis and sputum    Cardiovascular  Negative for orthopnea, claudication and PND    Gastrointestinal  Negative for abdominal pain, diarrhea, blood in stool    Musculoskeletal  Negative for joint pain, negative for myalgia    Skin  Negative for rash or itching    Endo/heme/allergies  Negative for polydipsia, environmental allergy    Psychiatric/behavioral  Negative for suicidal ideation. Patient is not anxious        Physical Exam:   BP (!) 151/90   Pulse 68   Temp 98.6 °F (37 °C)   Resp 15   Ht 5' 5\" (1.651 m)   Wt 169 lb (76.7 kg)   SpO2 100%   BMI 28.12 kg/m²   Temp (24hrs), Av.5 °F (36.9 °C), Min:98.4 °F (36.9 °C), Max:98.6 °F (37 °C)    No results for input(s): POCGLU in the last 72 hours. No intake or output data in the 24 hours ending 18 1310    General Appearance:  alert, well appearing, and in no acute distress  Mental status: oriented to person, place, and time with normal affect  HEENT:  normocephalic, atraumatic.   Lungs: Bilateral equal air entry, clear to ausculation, no wheezing, rales or rhonchi, normal effort  Cardiovascular: normal rate, regular rhythm, no murmur, gallop, rub. Abdomen: Soft, nontender, nondistended, normal bowel sounds, no hepatomegaly or splenomegaly    NEUROLOGIC EXAMINATION    EXAMINATION:  GENERAL     SKIN  Appears comfortable and in no distress     No gross lesions, rashes, bruising or bleeding on exposed skin area   HEENT NC/ AT  Eyes:no icterus, redness, pupils equal and reactive, extraocular eye movements intact, conjunctiva clear  Ear: normal external ear, no discharge, hearing intact  Nose:  no drainage noted  Mouth: mucous membranes moist   NECK  LUNGS  Supple and no bruits heard  Clear to auscultation,b/l   Cardiovascular  Abdomen  S1, S2 heard; radial pulse intact,RRR  Soft,non-tender,no organomegaly,BS+   MENTAL STATUS:  Alert, oriented, intact memory, no confusion, normal speech, normal language, no hallucination or delusion   CRANIAL NERVES: II     -       Pupils reactive b/l visual acuity: 20/30 OU; Fundus exam: intact venous pulsations; Visual fields intact to confrontation  III,IV,VI -  EOMs full, no afferent defect, no                      KYM, no ptosis  V     -     Decreased  facial sensation on right lower face   VII    -     Normal facial symmetry  VIII   -     Intact hearing  IX,X -     Symmetrical palate  XI    -     Symmetrical shoulder shrug  XII   -     Midline tongue, no atrophy    MOTOR FUNCTION:  Bulk R side:Normal            L side:Normal  Tone  R side:Normal            L side:Normal   Power 4/5 in R- upper and lower extremties;     Power 5/5 in L- upper and lower extremties;       no involuntary movements, no tremor      SENSORY FUNCTION:                                Extremities: Touch     R side:decreased                 L side:Normal      Pain      R side decreased                  L side:Normal  Vibration R side:Normal                  L side:Normal     Proprioception R side:Normal                          L side:Normal     Peripheral pulses palpable, no pedal edema or calf pain with palpation   CEREBELLAR FUNCTION:  Heel to Shin:     Right side:  normal                             Left side:  normal     Finger to Nose:   Right:  normal                             Left:  normal                REFLEX FUNCTION:  Symmetric, no perverted reflex,      Right Bicep:  2+  Left Bicep:  2+  Right Knee:  2+  Left Knee:  2+     Babinski sign   Right side:Downgoing                          Left side   :Downgoing   STATION and GAIT  patient has normal gait and station        Investigations:      Laboratory Testing:  Recent Results (from the past 24 hour(s))   STROKE PANEL    Collection Time: 08/22/18  9:50 AM   Result Value Ref Range    WBC 4.1 3.5 - 11.3 k/uL    RBC 4.34 3.95 - 5.11 m/uL    Hemoglobin 12.6 11.9 - 15.1 g/dL    Hematocrit 39.7 36.3 - 47.1 %    MCV 91.5 82.6 - 102.9 fL    MCH 29.0 25.2 - 33.5 pg    MCHC 31.7 28.4 - 34.8 g/dL    RDW 11.4 (L) 11.8 - 14.4 %    Platelets 828 130 - 868 k/uL    MPV 12.6 8.1 - 13.5 fL    NRBC Automated 0.0 0.0 per 100 WBC    Differential Type NOT REPORTED     Seg Neutrophils 44 36 - 65 %    Lymphocytes 44 (H) 24 - 43 %    Monocytes 9 3 - 12 %    Eosinophils % 2 1 - 4 %    Basophils 1 0 - 2 %    Immature Granulocytes 0 0 %    Segs Absolute 1.80 1.50 - 8.10 k/uL    Absolute Lymph # 1.83 1.10 - 3.70 k/uL    Absolute Mono # 0.35 0.10 - 1.20 k/uL    Absolute Eos # 0.10 0.00 - 0.44 k/uL    Basophils # 0.05 0.00 - 0.20 k/uL    Absolute Immature Granulocyte <0.03 0.00 - 0.30 k/uL    WBC Morphology NOT REPORTED     RBC Morphology NOT REPORTED     Platelet Estimate NOT REPORTED     Protime 10.3 9.0 - 12.0 sec    INR 1.0     PTT 26.5 20.5 - 30.5 sec    Myoglobin 29 25 - 58 ng/mL    Troponin T <0.03 <0.03 ng/mL    Troponin Interp          Glucose 115 (H) 70 - 99 mg/dL    BUN 9 6 - 20 mg/dL    CREATININE 1.13 (H) 0.50 - 0.90 mg/dL    Bun/Cre Ratio NOT REPORTED 9 - 20    Calcium 10.0 8.6 - 10.4 mg/dL

## 2018-08-22 NOTE — FLOWSHEET NOTE
[facility] [address]     Emergency/Trauma Note    PATIENT NAME: Kristen Soriano    Shift date: 8/22/18  Shift day: Wednesday   Shift # 1    Room # 9150/3266-21   Name: Kristen Soriano            Age: 47 y.o. Gender: female          Shinto: [address] [place] [address] of Shinto: [facility]  Trauma/Incident type: Stroke Consult  Admit Date & Time: 8/22/2018  9:21 AM    PATIENT/EVENT DESCRIPTION:  Kristen Soriano is a 47 y.o. female who arrived via ground ambulance from her home. Per report the patient has numbness on her right arm and leg and is being evaluated for a stroke. CTA is inconclusive and she may have an MRI. She will be admitted. Pt to be admitted to 0536/0536-01. SPIRITUAL ASSESSMENT/INTERVENTION:     08/22/18 1149   Encounter Summary   Services provided to: Patient   Place of [address] [place] [address] No   Continue Visiting (8/22/18)   Complexity of Encounter Low   Length of Encounter 15 minutes   Spiritual Assessment Completed Yes   Crisis   Type (STROKE CONSULT)   Assessment Calm; Approachable; Hopeful;Coping   Intervention Active listening;Explored feelings, thoughts, concerns;Prayer;[place]   Outcome Acceptance;Comfort;Expressed gratitude; Hopeful     Patient was awake and alert and seemed glad to see the . She calmly told me about her symptoms. She was alone at the time of my visit but she indicated that she has good support from her family and that her daughter is on the way to the hospital to be with her. She also has a  and 4 children. She has zafar in God to sustain her. She welcomed my offer of prayer. PATIENT BELONGINGS:  No belongings noted    ANY BELONGINGS OF SIGNIFICANT VALUE NOTED:  No.    REGISTRATION STAFF NOTIFIED? Yes    WHAT IS YOUR SPIRITUAL CARE PLAN FOR THIS PATIENT?:  Chaplains are available for further spiritual and emotional support as needed.       Electronically signed by Saurav Oglesby on 8/22/2018 at 2:48
[address]  [phone]

## 2018-08-22 NOTE — PLAN OF CARE
Problem: Falls - Risk of:  Goal: Will remain free from falls  Will remain free from falls  Outcome: Ongoing                              Pt assessed as a fall risk this shift. Remains free from falls and accidental injury at this time. Fall precautions in place, including falling star sign. Floor free from obstacles, and bed is locked and in lowest position. Adequate lighting provided. Pt encouraged to call before getting Out Of Bed for any need. Will continue to monitor needs during hourly rounding, and reinforce education on use of call light. .Rudi Sosa RN                  Problem: ACTIVITY INTOLERANCE/IMPAIRED MOBILITY  Goal: Mobility/activity is maintained at optimum level for patient  Outcome: Ongoing  No new stroke symptoms noted. Will continue to noted. Rudi Sosa RN

## 2018-08-22 NOTE — PROGRESS NOTES
Speech Language Pathology  Facility/Department: [facility]  Initial Speech/Language/Cognitive Assessment    NAME: Cheyenne Sosa  : 1965   MRN: [id number]  ADMISSION DATE: 2018  ADMITTING DIAGNOSIS: has Calculus (=stone); HTN (hypertension); Asthma; Syncope and collapse; Colon cancer screening; and Ischemic stroke [facility]) on her problem list.    Date of Eval: 2018   Evaluating Therapist: ROSANNA Ruvalcaba    Primary Complaint: The patient is a 47 y.o. [place] L-handed  female who presents with  right sided hemiparesis and right sided hemisensory lost.  The patient denies ever having these symptoms in the past. Her last known well was 1 week ago . The patient endorsees  ascending weakness and sensory loss on her right side. Patient also  reports that her right leg feels heavier but is able to ambulate independently. The patient also complains of B/L temporal headache yesterday, but was relieved with Motrin . Patient reports a family history of stroke. The patient denies blurry vision, headache, chest pain and shortness of breath. NIH on admission was 3. Pain:  Pain Assessment  Patient Currently in Pain: No    Assessment:  Pt presents with no apparent cognitive deficits at this time. No dysarthria noted. No further ST is recommended. Verbal education provided. 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

## 2018-08-22 NOTE — CONSULTS
touched   9. Best Language:  0 - no aphasia, normal  10. Dysarthria:  0 - normal  11. Extinction and Inattention:  0 - no abnormality  NIH : 3     Allergies  is allergic to sulfa antibiotics. Medications  Prior to Admission medications    Medication Sig Start Date End Date Taking? Authorizing Provider   albuterol sulfate HFA (VENTOLIN HFA) 108 (90 Base) MCG/ACT inhaler Inhale 2 puffs into the lungs every 6 hours as needed for Wheezing 4/9/18   Rick Kirk MD   lisinopril (PRINIVIL;ZESTRIL) 10 MG tablet Take 10 mg by mouth daily Unsure of dose    Historical Provider, MD   ibuprofen (ADVIL;MOTRIN) 600 MG tablet Take 1 tablet by mouth every 8 hours as needed for Pain 3/17/17   Vic Keyes MD   butalbital-acetaminophen-caffeine (FIORICET) -81 MG per tablet Take 1 tablet by mouth every 6 hours as needed for Headaches 12/24/16   Edgar Brown MD   amLODIPine (NORVASC) 5 MG tablet Take 5 mg by mouth 2 times daily. Historical Provider, MD   fluticasone-salmeterol (ADVAIR) 250-50 MCG/DOSE AEPB Inhale 1 puff into the lungs daily. Historical Provider, MD    Scheduled Meds:  Continuous Infusions:  PRN Meds:.  Past Medical History   has a past medical history of Asthma; Hypertension; and Kidney stone.       ROS  Social History     Tobacco History     Smoking Status  Never Smoker    Smokeless Tobacco Use  Never Used          Alcohol History     Alcohol Use Status  No          Drug Use     Drug Use Status  No          Sexual Activity     Sexually Active  Not Asked                  OBJECTIVE  BP (!) 151/92   Pulse 71   Temp 98.4 °F (36.9 °C)   Resp 18   Ht 5' 5\" (1.651 m)   Wt 169 lb (76.7 kg)   SpO2 100%   BMI 28.12 kg/m²                        EXAMINATION:  GENERAL    SKIN  Appears comfortable and in no distress    No gross lesions, rashes, bruising or bleeding on exposed skin area   HEENT NC/ AT  Eyes:no icterus, redness, pupils equal and reactive, extraocular eye movements intact, conjunctiva clear  Ear: normal external ear, no discharge, hearing intact  Nose:  no drainage noted  Mouth: mucous membranes moist   NECK  LUNGS  Supple and no bruits heard  Clear to auscultation,b/l   Cardiovascular  Abdomen  S1, S2 heard; radial pulse intact,RRR  Soft,non-tender,no organomegaly,BS+   MENTAL STATUS:  Alert, oriented, intact memory, no confusion, normal speech, normal language, no hallucination or delusion   CRANIAL NERVES: II     -       Pupils reactive b/l visual acuity: 20/30 OU; Fundus exam: intact venous pulsations; Visual fields intact to confrontation  III,IV,VI -  EOMs full, no afferent defect, no                      KYM, no ptosis  V     -     Decreased  facial sensation on right lower face   VII    -     Normal facial symmetry  VIII   -     Intact hearing  IX,X -     Symmetrical palate  XI    -     Symmetrical shoulder shrug  XII   -     Midline tongue, no atrophy    MOTOR FUNCTION:  Bulk R side:Normal            L side:Normal  Tone  R side:Normal            L side:Normal   Power 4/5 in R- upper and lower extremties;     Power 5/5 in L- upper and lower extremties;      no involuntary movements, no tremor     SENSORY FUNCTION:                      Extremities: Touch     R side:decreased                 L side:Normal     Pain      R side decreased                  L side:Normal  Vibration R side:Normal                  L side:Normal    Proprioception R side:Normal                          L side:Normal    Peripheral pulses palpable, no pedal edema or calf pain with palpation   CEREBELLAR FUNCTION:  Heel to Shin:     Right side:  normal                             Left side:  normal    Finger to Nose:   Right:  normal                             Left:  normal            REFLEX FUNCTION:  Symmetric, no perverted reflex,      Right Bicep:  2+  Left Bicep:  2+  Right Knee:  2+  Left Knee:  2+    Babinski sign   Right side:Downgoing                          Left side   :Downgoing   STATION and GAIT  patient has normal gait and station            Imaging:  Images were personally reviewed including:  CT brain without contrast:no acute ICH . CTA Head and neck: No evidence of acute large vessel occlusion, linear hypodensity at the right carotid bifurcation, with mild extension into the right internal carotid artery proximally.  Findings may represent short-segment dissection    Assessment and Plan:    1. This is a 61-year-old left-handed female who  presents with worsening right-sided hemiparesis and right-sided hemisensory loss and hemisensory loss in  the right lower face. CT head: neg for an  acute ICH. CTA head & neck: no large vessel occlusion. Recommendations:  1. Patient loaded with Plavix 300mg and aspirin 325mg  2. MRI brain, MRI Fat Sat   3. Asa 81 and Plavix 75 mg daily on 8/23  4. A1c, Lipid panel, 2D Echo  5. ST/PT/OT  6. Permissive HTN    7. Peridex , Lipitor 40mg daily   No emergent intervention from Endovascular at this time     I reviewed the residents note and agree with the documented findings and plan of care. Any areas of disagreement are noted on the chart. I agree with the chief complaint, past medical history, past surgical history, allergies, medications, ROS, social and family history as documented unless otherwise noted above.      Discussed with Dr. Juan Beverly, DOPager: [phone]  Electronically signed 8/22/2018 at 10:06 AM

## 2018-08-22 NOTE — ED PROVIDER NOTES
is pronator drift present on the right. She was unable to hold right lower she'll be off the bed for greater than 5 seconds. Skin: Skin is warm and dry. No erythema. Psychiatric: She has a normal mood and affect. Her behavior is normal. Judgment and thought content normal.   Nursing note and vitals reviewed. DIFFERENTIAL DIAGNOSIS/ MDM:     She'll consult paged out. We'll get stroke panel, EKG, chest x-ray, and CT scan of the head. I spoke with the neurology resident who will admit patient. She we'll get an MRI. I spoke with the radiologist who called about a short segment dissection and I notified the neurology resident of this result. She states that she will speak with her attending. I spoke with second resident who said that her attendings are aware of the possible dissection. Patient is to go up to the floor. DIAGNOSTIC RESULTS     EKG: All EKG's are interpreted by the Emergency Department Physician who either signs or Co-signs this chart in the absence of a cardiologist.    EKG Interpretation    Interpreted by emergency department physician    Rhythm: normal sinus   Rate: normal  Axis: normal  Ectopy: none  Conduction: normal  ST Segments: normal  T Waves: non specific changes  Q Waves: none    Clinical Impression: non-specific EKG    Kameron Adkins      RADIOLOGY:   I directly visualized the following  images and reviewed the radiologist interpretations:   Xr Chest Standard (2 Vw)    Result Date: 8/22/2018  EXAMINATION: TWO VIEWS OF THE CHEST 8/22/2018 10:59 am COMPARISON: April 9, 2018. HISTORY: ORDERING SYSTEM PROVIDED HISTORY: cva TECHNOLOGIST PROVIDED HISTORY: cva FINDINGS: Cardiac and mediastinal contours are unchanged. There is increased left lower lobe and left retrocardiac pulmonary opacity. Right lung remains clear. No significant pleural effusion. No evidence of pneumothorax. Increased left lower lobe and left retrocardiac pulmonary opacity.   This may represent weight based adjustment of the mA/kV was utilized to reduce the radiation dose to as low as reasonably achievable. COMPARISON: None. HISTORY: ORDERING SYSTEM PROVIDED HISTORY: STROKE TECHNOLOGIST PROVIDED HISTORY: FINDINGS: CTA NECK: AORTIC ARCH/ARCH VESSELS: No significant stenosis is seen of the innominate artery or subclavian arteries. CAROTID ARTERIES: The common carotid arteries are normal in appearance without evidence of a flow limiting stenosis. There is asymmetric linear hypodensity at the right carotid bifurcation, with mild extension into the right proximal internal carotid artery. This measures approximately 12 mm in length. This is best seen on series 607, image 77. The left carotid bifurcation is unremarkable. No significant stenosis at the carotid bifurcation on either side. No significant atherosclerotic disease. VERTEBRAL ARTERIES: The vertebral arteries both arise from the subclavian arteries and are normal in caliber without evidence of flow limiting stenosis or dissection. SOFT TISSUES: The visualized lung apices are clear. No evidence of acute soft tissue findings within the neck. The visualized soft tissue structures of the neck are grossly unremarkable. The nasopharynx, oropharynx, hypopharynx, and laryngeal structures are grossly unremarkable. BONES: The visualized osseous structures appear unremarkable. CTA HEAD: ANTERIOR CIRCULATION: The internal carotid arteries are normal in course and caliber without focal stenosis. The anterior cerebral and middle cerebral arteries demonstrate no focal stenosis. POSTERIOR CIRCULATION: The posterior cerebral arteries demonstrate no focal stenosis. The vertebral and basilar arteries appear unremarkable. BRAIN: No mass effect or midline shift. No abnormal extra-axial fluid collection. The gray-white differentiation appears grossly maintained.      Linear hypodensity at the right carotid bifurcation, with mild extension into the right internal carotid artery proximally. Findings may represent short-segment dissection. No significant luminal narrowing. No significant distal extension. No high-grade stenosis or focal occlusion involving the cervical, or intracranial vasculature. No evidence of intracranial aneurysm. Cta Head W Contrast    Result Date: 8/22/2018  EXAMINATION: CTA OF THE HEAD WITH CONTRAST; CTA OF THE NECK 8/22/2018 10:59 am: TECHNIQUE: CTA of the head/brain was performed with the administration of intravenous contrast. Multiplanar reformatted images are provided for review. MIP images are provided for review. Dose modulation, iterative reconstruction, and/or weight based adjustment of the mA/kV was utilized to reduce the radiation dose to as low as reasonably achievable.; CTA of the neck was performed with the administration of intravenous contrast. Multiplanar reformatted images are provided for review. MIP images are provided for review. Stenosis of the internal carotid arteries measured using NASCET criteria. Dose modulation, iterative reconstruction, and/or weight based adjustment of the mA/kV was utilized to reduce the radiation dose to as low as reasonably achievable. COMPARISON: None. HISTORY: ORDERING SYSTEM PROVIDED HISTORY: STROKE TECHNOLOGIST PROVIDED HISTORY: FINDINGS: CTA NECK: AORTIC ARCH/ARCH VESSELS: No significant stenosis is seen of the innominate artery or subclavian arteries. CAROTID ARTERIES: The common carotid arteries are normal in appearance without evidence of a flow limiting stenosis. There is asymmetric linear hypodensity at the right carotid bifurcation, with mild extension into the right proximal internal carotid artery. This measures approximately 12 mm in length. This is best seen on series 607, image 77. The left carotid bifurcation is unremarkable. No significant stenosis at the carotid bifurcation on either side. No significant atherosclerotic disease.  VERTEBRAL ARTERIES: The vertebral arteries both arise from the subclavian arteries and are normal in caliber without evidence of flow limiting stenosis or dissection. SOFT TISSUES: The visualized lung apices are clear. No evidence of acute soft tissue findings within the neck. The visualized soft tissue structures of the neck are grossly unremarkable. The nasopharynx, oropharynx, hypopharynx, and laryngeal structures are grossly unremarkable. BONES: The visualized osseous structures appear unremarkable. CTA HEAD: ANTERIOR CIRCULATION: The internal carotid arteries are normal in course and caliber without focal stenosis. The anterior cerebral and middle cerebral arteries demonstrate no focal stenosis. POSTERIOR CIRCULATION: The posterior cerebral arteries demonstrate no focal stenosis. The vertebral and basilar arteries appear unremarkable. BRAIN: No mass effect or midline shift. No abnormal extra-axial fluid collection. The gray-white differentiation appears grossly maintained. Linear hypodensity at the right carotid bifurcation, with mild extension into the right internal carotid artery proximally. Findings may represent short-segment dissection. No significant luminal narrowing. No significant distal extension. No high-grade stenosis or focal occlusion involving the cervical, or intracranial vasculature. No evidence of intracranial aneurysm.      ED BEDSIDE ULTRASOUND:   Not clinically indicated    LABS:  Labs Reviewed   STROKE PANEL - Abnormal; Notable for the following:        Result Value    RDW 11.4 (*)     Lymphocytes 44 (*)     Glucose 115 (*)     CREATININE 1.13 (*)     Potassium 3.6 (*)     GFR Non- 51 (*)     All other components within normal limits   HEMOGLOBIN A1C   LIPID, FASTING   POCT TROPONIN   POCT TROPONIN   POCT TROPONIN   POCT TROPONIN       unremarkable    EMERGENCY DEPARTMENT COURSE:   Vitals:    Vitals:    08/22/18 1221 08/22/18 1231 08/22/18 1245 08/22/18 1259   BP: (!) 148/87 (!) 143/89 (!) 151/90 Pulse:   67 68   Resp:   15    Temp:   98.6 °F (37 °C)    SpO2: 99% 100%     Weight:       Height:         -------------------------  BP: (!) 151/90, Temp: 98.6 °F (37 °C), Pulse: 68, Resp: 15    wnl    CONSULTS:  Neuro    PROCEDURES:  None    FINAL IMPRESSION      1.  Numbness          DISPOSITION/PLAN     Admit    PATIENT REFERRED TO:  Randee Lassiter, [address] [facility] [address] [phone]            DISCHARGE MEDICATIONS:  Current Discharge Medication List          (Please note that portions of this note were completed with a voice recognition program.  Efforts were made to edit the dictations but occasionally words are mis-transcribed.)    Jaci Harvey MD  Attending Emergency Physician                      Jaci Harvey MD  08/22/18 4044

## 2018-08-23 ENCOUNTER — APPOINTMENT (OUTPATIENT)
Dept: GENERAL RADIOLOGY | Age: 53
End: 2018-08-23
Payer: MEDICARE

## 2018-08-23 ENCOUNTER — HOSPITAL ENCOUNTER (EMERGENCY)
Age: 53
Discharge: HOME | End: 2018-08-24
Attending: EMERGENCY MEDICINE | Admitting: EMERGENCY MEDICINE
Payer: MEDICARE

## 2018-08-23 VITALS
BODY MASS INDEX: 28.16 KG/M2 | HEART RATE: 84 BPM | HEIGHT: 65 IN | OXYGEN SATURATION: 100 % | DIASTOLIC BLOOD PRESSURE: 96 MMHG | TEMPERATURE: 98 F | RESPIRATION RATE: 16 BRPM | WEIGHT: 169 LBS | SYSTOLIC BLOOD PRESSURE: 136 MMHG

## 2018-08-23 DIAGNOSIS — R55 SYNCOPE AND COLLAPSE: Primary | ICD-10-CM

## 2018-08-23 LAB
ABSOLUTE EOS #: 0.09 K/UL (ref 0–0.44)
ABSOLUTE IMMATURE GRANULOCYTE: <0.03 K/UL (ref 0–0.3)
ABSOLUTE LYMPH #: 1.65 K/UL (ref 1.1–3.7)
ABSOLUTE MONO #: 0.37 K/UL (ref 0.1–1.2)
ALBUMIN SERPL-MCNC: 3.6 G/DL (ref 3.5–5.2)
ALBUMIN/GLOBULIN RATIO: 1.1 (ref 1–2.5)
ALP BLD-CCNC: 114 U/L (ref 35–104)
ALT SERPL-CCNC: 13 U/L (ref 5–33)
ANION GAP SERPL CALCULATED.3IONS-SCNC: 9 MMOL/L (ref 9–17)
ANION GAP SERPL CALCULATED.3IONS-SCNC: 9 MMOL/L (ref 9–17)
AST SERPL-CCNC: 15 U/L
BASOPHILS # BLD: 1 % (ref 0–2)
BASOPHILS ABSOLUTE: 0.03 K/UL (ref 0–0.2)
BILIRUB SERPL-MCNC: 0.31 MG/DL (ref 0.3–1.2)
BUN BLDV-MCNC: 7 MG/DL (ref 6–20)
BUN BLDV-MCNC: 8 MG/DL (ref 6–20)
BUN/CREAT BLD: ABNORMAL (ref 9–20)
BUN/CREAT BLD: ABNORMAL (ref 9–20)
CALCIUM SERPL-MCNC: 10.2 MG/DL (ref 8.6–10.4)
CALCIUM SERPL-MCNC: 9.6 MG/DL (ref 8.6–10.4)
CHLORIDE BLD-SCNC: 102 MMOL/L (ref 98–107)
CHLORIDE BLD-SCNC: 106 MMOL/L (ref 98–107)
CHOLESTEROL/HDL RATIO: 3.4
CHOLESTEROL: 208 MG/DL
CO2: 21 MMOL/L (ref 20–31)
CO2: 23 MMOL/L (ref 20–31)
CREAT SERPL-MCNC: 0.96 MG/DL (ref 0.5–0.9)
CREAT SERPL-MCNC: 1.11 MG/DL (ref 0.5–0.9)
DIFFERENTIAL TYPE: ABNORMAL
EKG ATRIAL RATE: 75 BPM
EKG P AXIS: 42 DEGREES
EKG P-R INTERVAL: 166 MS
EKG Q-T INTERVAL: 350 MS
EKG QRS DURATION: 84 MS
EKG QTC CALCULATION (BAZETT): 390 MS
EKG R AXIS: -3 DEGREES
EKG T AXIS: 19 DEGREES
EKG VENTRICULAR RATE: 75 BPM
EOSINOPHILS RELATIVE PERCENT: 2 % (ref 1–4)
GFR AFRICAN AMERICAN: >60 ML/MIN
GFR AFRICAN AMERICAN: >60 ML/MIN
GFR NON-AFRICAN AMERICAN: 52 ML/MIN
GFR NON-AFRICAN AMERICAN: >60 ML/MIN
GFR SERPL CREATININE-BSD FRML MDRD: ABNORMAL ML/MIN/{1.73_M2}
GLUCOSE BLD-MCNC: 94 MG/DL (ref 70–99)
GLUCOSE BLD-MCNC: 96 MG/DL (ref 70–99)
HCT VFR BLD CALC: 38.9 % (ref 36.3–47.1)
HCT VFR BLD CALC: 40.3 % (ref 36.3–47.1)
HDLC SERPL-MCNC: 62 MG/DL
HEMOGLOBIN: 12.4 G/DL (ref 11.9–15.1)
HEMOGLOBIN: 13.1 G/DL (ref 11.9–15.1)
IMMATURE GRANULOCYTES: 0 %
INR BLD: 1
LDL CHOLESTEROL: 127 MG/DL (ref 0–130)
LV EF: 55 %
LVEF MODALITY: NORMAL
LYMPHOCYTES # BLD: 41 % (ref 24–43)
MAGNESIUM: 1.9 MG/DL (ref 1.6–2.6)
MCH RBC QN AUTO: 29.2 PG (ref 25.2–33.5)
MCH RBC QN AUTO: 29.5 PG (ref 25.2–33.5)
MCHC RBC AUTO-ENTMCNC: 31.9 G/DL (ref 28.4–34.8)
MCHC RBC AUTO-ENTMCNC: 32.5 G/DL (ref 28.4–34.8)
MCV RBC AUTO: 90.8 FL (ref 82.6–102.9)
MCV RBC AUTO: 91.5 FL (ref 82.6–102.9)
MONOCYTES # BLD: 9 % (ref 3–12)
NRBC AUTOMATED: 0 PER 100 WBC
NRBC AUTOMATED: 0 PER 100 WBC
PARTIAL THROMBOPLASTIN TIME: 22 SEC (ref 20.5–30.5)
PDW BLD-RTO: 11.5 % (ref 11.8–14.4)
PDW BLD-RTO: 11.5 % (ref 11.8–14.4)
PHOSPHORUS: 3 MG/DL (ref 2.6–4.5)
PLATELET # BLD: 155 K/UL (ref 138–453)
PLATELET # BLD: ABNORMAL K/UL (ref 138–453)
PLATELET ESTIMATE: ABNORMAL
PLATELET, FLUORESCENCE: 197 K/UL (ref 138–453)
PLATELET, IMMATURE FRACTION: 11.3 % (ref 1.1–10.3)
PMV BLD AUTO: 12.8 FL (ref 8.1–13.5)
PMV BLD AUTO: ABNORMAL FL (ref 8.1–13.5)
POC TROPONIN I: 0.01 NG/ML (ref 0–0.1)
POC TROPONIN INTERP: NORMAL
POTASSIUM SERPL-SCNC: 3.8 MMOL/L (ref 3.7–5.3)
POTASSIUM SERPL-SCNC: 4.9 MMOL/L (ref 3.7–5.3)
PROTHROMBIN TIME: 10.3 SEC (ref 9–12)
RBC # BLD: 4.25 M/UL (ref 3.95–5.11)
RBC # BLD: 4.44 M/UL (ref 3.95–5.11)
RBC # BLD: ABNORMAL 10*6/UL
SEG NEUTROPHILS: 46 % (ref 36–65)
SEGMENTED NEUTROPHILS ABSOLUTE COUNT: 1.84 K/UL (ref 1.5–8.1)
SODIUM BLD-SCNC: 134 MMOL/L (ref 135–144)
SODIUM BLD-SCNC: 136 MMOL/L (ref 135–144)
TOTAL PROTEIN: 7 G/DL (ref 6.4–8.3)
TRIGL SERPL-MCNC: 97 MG/DL
VLDLC SERPL CALC-MCNC: ABNORMAL MG/DL (ref 1–30)
WBC # BLD: 4 K/UL (ref 3.5–11.3)
WBC # BLD: 4 K/UL (ref 3.5–11.3)
WBC # BLD: ABNORMAL 10*3/UL

## 2018-08-23 PROCEDURE — 6370000000 HC RX 637 (ALT 250 FOR IP): Performed by: EMERGENCY MEDICINE

## 2018-08-23 PROCEDURE — G8978 MOBILITY CURRENT STATUS: HCPCS

## 2018-08-23 PROCEDURE — 99239 HOSP IP/OBS DSCHRG MGMT >30: CPT | Performed by: PSYCHIATRY & NEUROLOGY

## 2018-08-23 PROCEDURE — 80048 BASIC METABOLIC PNL TOTAL CA: CPT

## 2018-08-23 PROCEDURE — 84484 ASSAY OF TROPONIN QUANT: CPT

## 2018-08-23 PROCEDURE — 84100 ASSAY OF PHOSPHORUS: CPT

## 2018-08-23 PROCEDURE — 85730 THROMBOPLASTIN TIME PARTIAL: CPT

## 2018-08-23 PROCEDURE — G8979 MOBILITY GOAL STATUS: HCPCS

## 2018-08-23 PROCEDURE — 71046 X-RAY EXAM CHEST 2 VIEWS: CPT

## 2018-08-23 PROCEDURE — 85025 COMPLETE CBC W/AUTO DIFF WBC: CPT

## 2018-08-23 PROCEDURE — 6370000000 HC RX 637 (ALT 250 FOR IP): Performed by: STUDENT IN AN ORGANIZED HEALTH CARE EDUCATION/TRAINING PROGRAM

## 2018-08-23 PROCEDURE — 83735 ASSAY OF MAGNESIUM: CPT

## 2018-08-23 PROCEDURE — 93005 ELECTROCARDIOGRAM TRACING: CPT

## 2018-08-23 PROCEDURE — 85610 PROTHROMBIN TIME: CPT

## 2018-08-23 PROCEDURE — G0378 HOSPITAL OBSERVATION PER HR: HCPCS

## 2018-08-23 PROCEDURE — 6360000002 HC RX W HCPCS: Performed by: STUDENT IN AN ORGANIZED HEALTH CARE EDUCATION/TRAINING PROGRAM

## 2018-08-23 PROCEDURE — 97530 THERAPEUTIC ACTIVITIES: CPT

## 2018-08-23 PROCEDURE — 99284 EMERGENCY DEPT VISIT MOD MDM: CPT

## 2018-08-23 PROCEDURE — 85055 RETICULATED PLATELET ASSAY: CPT

## 2018-08-23 PROCEDURE — 80061 LIPID PANEL: CPT

## 2018-08-23 PROCEDURE — 36415 COLL VENOUS BLD VENIPUNCTURE: CPT

## 2018-08-23 PROCEDURE — 94762 N-INVAS EAR/PLS OXIMTRY CONT: CPT

## 2018-08-23 PROCEDURE — 93306 TTE W/DOPPLER COMPLETE: CPT

## 2018-08-23 PROCEDURE — 85027 COMPLETE CBC AUTOMATED: CPT

## 2018-08-23 PROCEDURE — G8980 MOBILITY D/C STATUS: HCPCS

## 2018-08-23 PROCEDURE — 97161 PT EVAL LOW COMPLEX 20 MIN: CPT

## 2018-08-23 PROCEDURE — 80053 COMPREHEN METABOLIC PANEL: CPT

## 2018-08-23 RX ORDER — CHLORHEXIDINE GLUCONATE 0.12 MG/ML
15 RINSE ORAL 2 TIMES DAILY
Qty: 420 ML | Refills: 0 | Status: SHIPPED | OUTPATIENT
Start: 2018-08-23 | End: 2018-08-31 | Stop reason: SDUPTHER

## 2018-08-23 RX ORDER — ATORVASTATIN CALCIUM 80 MG/1
40 TABLET, FILM COATED ORAL NIGHTLY
Status: CANCELLED | OUTPATIENT
Start: 2018-08-23

## 2018-08-23 RX ORDER — SODIUM CHLORIDE 0.9 % (FLUSH) 0.9 %
10 SYRINGE (ML) INJECTION EVERY 12 HOURS SCHEDULED
Status: CANCELLED | OUTPATIENT
Start: 2018-08-23

## 2018-08-23 RX ORDER — AMLODIPINE BESYLATE 10 MG/1
5 TABLET ORAL 2 TIMES DAILY
Status: CANCELLED | OUTPATIENT
Start: 2018-08-23

## 2018-08-23 RX ORDER — CLOPIDOGREL BISULFATE 75 MG/1
75 TABLET ORAL DAILY
Qty: 30 TABLET | Refills: 3 | Status: SHIPPED | OUTPATIENT
Start: 2018-08-24 | End: 2018-08-31 | Stop reason: ALTCHOICE

## 2018-08-23 RX ORDER — SODIUM CHLORIDE 9 MG/ML
INJECTION, SOLUTION INTRAVENOUS CONTINUOUS
Status: CANCELLED | OUTPATIENT
Start: 2018-08-23

## 2018-08-23 RX ORDER — ASPIRIN 81 MG/1
81 TABLET ORAL DAILY
Qty: 30 TABLET | Refills: 3 | Status: SHIPPED | OUTPATIENT
Start: 2018-08-24 | End: 2018-08-31 | Stop reason: SDUPTHER

## 2018-08-23 RX ORDER — SODIUM CHLORIDE 0.9 % (FLUSH) 0.9 %
10 SYRINGE (ML) INJECTION PRN
Status: CANCELLED | OUTPATIENT
Start: 2018-08-23

## 2018-08-23 RX ORDER — ATORVASTATIN CALCIUM 40 MG/1
40 TABLET, FILM COATED ORAL NIGHTLY
Qty: 30 TABLET | Refills: 3 | Status: SHIPPED | OUTPATIENT
Start: 2018-08-23 | End: 2018-08-31 | Stop reason: SDUPTHER

## 2018-08-23 RX ORDER — ACETAMINOPHEN 325 MG/1
650 TABLET ORAL EVERY 4 HOURS PRN
Status: CANCELLED | OUTPATIENT
Start: 2018-08-23

## 2018-08-23 RX ORDER — BLOOD PRESSURE TEST KIT
1 KIT MISCELLANEOUS 2 TIMES DAILY
Qty: 1 KIT | Refills: 0 | Status: SHIPPED | OUTPATIENT
Start: 2018-08-23 | End: 2019-02-25

## 2018-08-23 RX ORDER — LISINOPRIL 10 MG/1
10 TABLET ORAL DAILY
Status: CANCELLED | OUTPATIENT
Start: 2018-08-24

## 2018-08-23 RX ADMIN — CLOPIDOGREL 75 MG: 75 TABLET, FILM COATED ORAL at 09:27

## 2018-08-23 RX ADMIN — GELATIN ABSORBABLE SPONGE 12-7 MM 1 EACH: 12-7 MISC at 23:36

## 2018-08-23 RX ADMIN — ENOXAPARIN SODIUM 40 MG: 40 INJECTION SUBCUTANEOUS at 09:27

## 2018-08-23 RX ADMIN — ASPIRIN 81 MG: 81 TABLET, DELAYED RELEASE ORAL at 09:27

## 2018-08-23 RX ADMIN — CHLORHEXIDINE GLUCONATE 0.12% ORAL RINSE 15 ML: 1.2 LIQUID ORAL at 09:27

## 2018-08-23 NOTE — CARE COORDINATION
Discharge [address] [facility] Case Management Department  Written by: Aysha Lanza RN    Patient Name: Sherill Cheadle  Attending Provider: No att. providers found  Admit Date: 2018  9:21 AM  MRN: [id number]  Account: [de-identified]                     : 1965  Discharge Date: 2018      Disposition: home independently    Aysha Lanza RN

## 2018-08-23 NOTE — DISCHARGE SUMMARY
Neurology Discharge Summary     Patient Identification:  Rosita Paul is a 47 y.o. female. :  1965  Admit Date:  2018  Discharge date and time: No discharge date for patient encounter. 18  Attending Provider: Cindy Mckeon MD     Account Number: [id number]                                   Admission Diagnoses:   Ischemic stroke [facility]) [I63.9]    Discharge Diagnoses: Active Problems:    Ischemic stroke [facility])  Resolved Problems:    * No resolved hospital problems. *      Discharge Medications:    Current Discharge Medication List           Details   aspirin 81 MG EC tablet Take 1 tablet by mouth daily  Qty: 30 tablet, Refills: 3      atorvastatin (LIPITOR) 40 MG tablet Take 1 tablet by mouth nightly  Qty: 30 tablet, Refills: 3      clopidogrel (PLAVIX) 75 MG tablet Take 1 tablet by mouth daily  Qty: 30 tablet, Refills: 3      chlorhexidine (PERIDEX) 0.12 % solution Take 15 mLs by mouth 2 times daily for 14 days  Qty: 420 mL, Refills: 0      Blood Pressure KIT 1 kit by Does not apply route 2 times daily Keep sbp 120-140  Qty: 1 kit, Refills: 0              Details   albuterol sulfate HFA (VENTOLIN HFA) 108 (90 Base) MCG/ACT inhaler Inhale 2 puffs into the lungs every 6 hours as needed for Wheezing  Qty: 1 Inhaler, Refills: 3      lisinopril (PRINIVIL;ZESTRIL) 10 MG tablet Take 10 mg by mouth daily Unsure of dose      ibuprofen (ADVIL;MOTRIN) 600 MG tablet Take 1 tablet by mouth every 8 hours as needed for Pain  Qty: 30 tablet, Refills: 0      butalbital-acetaminophen-caffeine (FIORICET) -40 MG per tablet Take 1 tablet by mouth every 6 hours as needed for Headaches  Qty: 20 tablet, Refills: 0      amLODIPine (NORVASC) 5 MG tablet Take 5 mg by mouth 2 times daily. fluticasone-salmeterol (ADVAIR) 250-50 MCG/DOSE AEPB Inhale 1 puff into the lungs daily.            Current Discharge Medication List      STOP taking these medications       ondansetron (ZOFRAN ODT) 4 MG disintegrating tablet Comments:   Reason for Stopping:         acetaminophen (AMINOFEN) 325 MG tablet Comments:   Reason for Stopping:         penicillin v potassium (VEETID) 500 MG tablet Comments:   Reason for Stopping:         nitrofurantoin, macrocrystal-monohydrate, (MACROBID) 100 MG capsule Comments:   Reason for Stopping:         ondansetron (ZOFRAN ODT) 4 MG disintegrating tablet Comments:   Reason for Stopping:         dicyclomine (BENTYL) 10 MG capsule Comments:   Reason for Stopping:                 Consults:   Neuro-endovascular    Hospital Course: This is a 21-year-old female who was admitted on 8/22/18 with a right-sided hemiparesis and right-sided hemisensory loss. On presentation she was complaining of ascending weakness and sensory loss. According to chart review she reported that her right leg for 5 year but is able to ambulate independently. One day ago she also complained of bilateral temporal headache that was relieved with Motrin mild in severity. Blood pressure on admission was 151/92. Her last known well was about 1 week ago no TPA was administered as she was out of the window. Her NIH SS on admission was 3. She was loaded with aspirin 325 mg and Plavix 300 mg.     CT head done on 8/22/18 was negative  CTA head and neck 8/22/18 were consistent with right carotid bifurcation linear hyperdensity with mild extension into right ICA proximally. Findings may represent short segment dissection . No significant luminal narrowing. No significant distal extension. MRI brain 8/22/18 was negative. MRI cervical spine 8/22/18 to rule out C-spine mass, edema was negative for any cervical myelopathy or mass or edema. Chest x-ray done on 8/22/18 were consistent with increased left lower lobe and left retrocardiac pulmonary [place]. Atelectasis versus aspiration versus pneumonia. EKG done on 8/22/18 was consistent with normal sinus rhythm ,nonspecific T-wave abnormality. Troponins were negative.   BMP was

## 2018-08-23 NOTE — PROGRESS NOTES
[facility]  Occupational Therapy Not Seen Note    DATE: 2018  Name: Floyd Alanis  : 1965  MRN: [id number]    Patient not available for Occupational Therapy due to:    [x] Testing: At [facility]. [] Hemodialysis    [] Blood Transfusion in Progress    []Refusal by Patient:    [] Surgery/Procedure:    [] Strict Bedrest    [] Sedation    [] Spine Precautions     [] Pt being transferred to palliative care at this time. Spoke with pt/family and OT services to be defered. [] Pt independent with functional mobility and functional tasks. Pt with no OT acute care needs at this time, will defer OT eval.    [] Other    Next Scheduled Treatment: Attempt in pm as time allows or on  as appropriate. Signature:  Contreras Pritchett, OTR/L

## 2018-08-23 NOTE — PROGRESS NOTES
[facility]  Occupational Therapy Not Seen Note    DATE: 2018  Name: Radha Lino  : 1965  MRN: [id number]    Patient not available for Occupational Therapy due to:    [] Testing:    [] Hemodialysis    [] Blood Transfusion in Progress    []Refusal by Patient:    [] Surgery/Procedure:    [] Strict Bedrest    [] Sedation    [] Spine Precautions     [] Pt being transferred to palliative care at this time. Spoke with pt/family and OT services to be defered. [x] Pt independent with functional mobility and functional tasks. Pt with no OT acute care needs at this time, will defer OT eval.    [] Other    Next Scheduled Treatment: N/A    Signature:  JAYDON Rivera/DAVON

## 2018-08-23 NOTE — PLAN OF CARE
Problem: ACTIVITY INTOLERANCE/IMPAIRED MOBILITY  Goal: Mobility/activity is maintained at optimum level for patient  Outcome: Ongoing  Neuro assessment completed, fall precautions in place, aspirations precautions in place, assess for barriers in communication and mobility, interventions to assist in communication and mobility in place, encouraged to call for assistance, adaptive devices used as needed, assess emotional state and support offered, encouraged patient to communicate by available means, and support systems included in patient care.

## 2018-08-23 NOTE — PROGRESS NOTES
Mobility: Walking and moving around  Mobility: Walking and Moving Around Current Status (): 0 percent impaired, limited or restricted  Mobility: Walking and Moving Around Goal Status (): 0 percent impaired, limited or restricted  Mobility: Walking and Moving Around Discharge Status (): 0 percent impaired, limited or restricted    AM-PAC Score  AM-PAC Inpatient Mobility Raw Score : 24  AM-PAC Inpatient T-Scale Score : 61.14  Mobility Inpatient CMS 0-100% Score: 0  Mobility Inpatient CMS G-Code Modifier : CH       Therapy Time   Individual Concurrent Group Co-treatment   Time In 1000         Time Out 1016         Minutes 16               Physical therapy to sign off at this time. Corbin Barry    This treatment/evaluation completed by signing SPT. Signing PT agrees with treatment and documentation.

## 2018-08-24 VITALS
DIASTOLIC BLOOD PRESSURE: 96 MMHG | SYSTOLIC BLOOD PRESSURE: 150 MMHG | HEART RATE: 74 BPM | RESPIRATION RATE: 16 BRPM | OXYGEN SATURATION: 98 % | TEMPERATURE: 98.3 F

## 2018-08-24 LAB
POC TROPONIN I: 0 NG/ML (ref 0–0.1)
POC TROPONIN INTERP: NORMAL

## 2018-08-24 PROCEDURE — G0378 HOSPITAL OBSERVATION PER HR: HCPCS

## 2018-08-24 PROCEDURE — 99254 IP/OBS CNSLTJ NEW/EST MOD 60: CPT | Performed by: PSYCHIATRY & NEUROLOGY

## 2018-08-24 ASSESSMENT — ENCOUNTER SYMPTOMS
SHORTNESS OF BREATH: 0
RHINORRHEA: 0
ABDOMINAL PAIN: 0

## 2018-08-24 NOTE — CONSULTS
Finger/Nose   Right:  normal              Left:  normal          Heel-Knee-Shin                Right:  normal              Left:  normal          Rapid Alternating Movements              Right:  normal              Left:  normal          Gait:   Deferred            Reflexes:             Deep Tendon Reflexes:     All DTR's are +2           Plantar response:                Right:  downgoing               Left:  downgoing                     Additional Examination Elements and Findings:     CONSTITUTIONAL:  awake, alert, cooperative, no apparent distress, and appears stated age  LUNGS:  No increased work of breathing, good air exchange, clear to auscultation bilaterally, no crackles or wheezing  CARDIOVASCULAR:  Normal apical impulse, regular rate and rhythm, normal S1 and S2, no S3 or S4, and no murmur noted  EXTREMITIES: Right arm bleeding covered with pads but still oozing    DATA  Recent Results (from the past 24 hour(s))   Lipid panel - fasting    Collection Time: 08/23/18  5:40 AM   Result Value Ref Range    Cholesterol 208 (H) <200 mg/dL    HDL 62 >40 mg/dL    LDL Cholesterol 127 0 - 130 mg/dL    Chol/HDL Ratio 3.4 <5    Triglycerides 97 <150 mg/dL    VLDL NOT REPORTED 1 - 30 mg/dL   CBC    Collection Time: 08/23/18  5:40 AM   Result Value Ref Range    WBC 4.0 3.5 - 11.3 k/uL    RBC 4.25 3.95 - 5.11 m/uL    Hemoglobin 12.4 11.9 - 15.1 g/dL    Hematocrit 38.9 36.3 - 47.1 %    MCV 91.5 82.6 - 102.9 fL    MCH 29.2 25.2 - 33.5 pg    MCHC 31.9 28.4 - 34.8 g/dL    RDW 11.5 (L) 11.8 - 14.4 %    Platelets 131 203 - 460 k/uL    MPV 12.8 8.1 - 13.5 fL    NRBC Automated 0.0 0.0 per 100 WBC   Comprehensive Metabolic Panel    Collection Time: 08/23/18  5:40 AM   Result Value Ref Range    Glucose 96 70 - 99 mg/dL    BUN 7 6 - 20 mg/dL    CREATININE 1.11 (H) 0.50 - 0.90 mg/dL    Bun/Cre Ratio NOT REPORTED 9 - 20    Calcium 9.6 8.6 - 10.4 mg/dL    Sodium 136 135 - 144 mmol/L    Potassium 3.8 3.7 - 5.3 mmol/L    Chloride 106 98 - 107 mmol/L    CO2 21 20 - 31 mmol/L    Anion Gap 9 9 - 17 mmol/L    Alkaline Phosphatase 114 (H) 35 - 104 U/L    ALT 13 5 - 33 U/L    AST 15 <32 U/L    Total Bilirubin 0.31 0.3 - 1.2 mg/dL    Total Protein 7.0 6.4 - 8.3 g/dL    Alb 3.6 3.5 - 5.2 g/dL    Albumin/Globulin Ratio 1.1 1.0 - 2.5    GFR Non-African American 52 (L) >60 mL/min    GFR African American >60 >60 mL/min    GFR Comment          GFR Staging NOT REPORTED    Magnesium    Collection Time: 08/23/18  5:40 AM   Result Value Ref Range    Magnesium 1.9 1.6 - 2.6 mg/dL   Phosphorus    Collection Time: 08/23/18  5:40 AM   Result Value Ref Range    Phosphorus 3.0 2.6 - 4.5 mg/dL   POCT troponin    Collection Time: 08/23/18  8:34 PM   Result Value Ref Range    POC Troponin I 0.01 0.00 - 0.10 ng/mL    POC Troponin Interp       The Troponin-I (POC) results cannot be compared to the Troponin-T results.    CBC Auto Differential    Collection Time: 08/23/18  8:50 PM   Result Value Ref Range    WBC 4.0 3.5 - 11.3 k/uL    RBC 4.44 3.95 - 5.11 m/uL    Hemoglobin 13.1 11.9 - 15.1 g/dL    Hematocrit 40.3 36.3 - 47.1 %    MCV 90.8 82.6 - 102.9 fL    MCH 29.5 25.2 - 33.5 pg    MCHC 32.5 28.4 - 34.8 g/dL    RDW 11.5 (L) 11.8 - 14.4 %    Platelets See Reflexed IPF Result 138 - 453 k/uL    MPV NOT REPORTED 8.1 - 13.5 fL    NRBC Automated 0.0 0.0 per 100 WBC    Differential Type NOT REPORTED     WBC Morphology NOT REPORTED     RBC Morphology NOT REPORTED     Platelet Estimate NOT REPORTED     Seg Neutrophils 46 36 - 65 %    Lymphocytes 41 24 - 43 %    Monocytes 9 3 - 12 %    Eosinophils % 2 1 - 4 %    Basophils 1 0 - 2 %    Immature Granulocytes 0 0 %    Segs Absolute 1.84 1.50 - 8.10 k/uL    Absolute Lymph # 1.65 1.10 - 3.70 k/uL    Absolute Mono # 0.37 0.10 - 1.20 k/uL    Absolute Eos # 0.09 0.00 - 0.44 k/uL    Basophils # 0.03 0.00 - 0.20 k/uL    Absolute Immature Granulocyte <0.03 0.00 - 0.30 k/uL   Basic Metabolic Panel    Collection Time: 08/23/18  8:50 PM   Result Value

## 2018-08-24 NOTE — ED PROVIDER NOTES
[facility]  ED  Emergency Department  Emergency Medicine Resident Sign-out     Care of Yandel Neal was assumed from Dr. Nickolas Tsang and is being seen for Loss of Consciousness  . The patient's initial evaluation and plan have been discussed with the prior provider who initially evaluated the patient. EMERGENCY DEPARTMENT COURSE / MEDICAL DECISION MAKING:       MEDICATIONS GIVEN:  Orders Placed This Encounter   Medications    gelatin adsorbable (GELFOAM) sponge 1 each    thrombin kit       LABS / RADIOLOGY:     Labs Reviewed   CBC WITH AUTO DIFFERENTIAL - Abnormal; Notable for the following:        Result Value    RDW 11.5 (*)     All other components within normal limits   BASIC METABOLIC PANEL - Abnormal; Notable for the following:     CREATININE 0.96 (*)     Sodium 134 (*)     All other components within normal limits   IMMATURE PLATELET FRACTION - Abnormal; Notable for the following:     Platelet, Immature Fraction 11.3 (*)     All other components within normal limits   APTT   PROTIME-INR   POCT TROPONIN   POCT TROPONIN   POCT TROPONIN   POCT TROPONIN       Xr Chest Standard (2 Vw)    Result Date: 8/23/2018  EXAMINATION: TWO VIEWS OF THE CHEST 8/23/2018 8:49 pm COMPARISON: 08/22/2018. HISTORY: ORDERING SYSTEM PROVIDED HISTORY: syncope TECHNOLOGIST PROVIDED HISTORY: syncope FINDINGS: The cardiomediastinal silhouette is unremarkable. The lungs are clear. No infiltrate, pleural fluid or evidence of overt failure. No acute osseous findings. No acute cardiopulmonary disease. Xr Chest Standard (2 Vw)    Result Date: 8/23/2018  EXAMINATION: TWO VIEWS OF THE CHEST 8/22/2018 10:59 am COMPARISON: April 9, 2018. HISTORY: ORDERING SYSTEM PROVIDED HISTORY: cva TECHNOLOGIST PROVIDED HISTORY: cva FINDINGS: Cardiac and mediastinal contours are unchanged. There is increased left lower lobe and left retrocardiac pulmonary opacity. Right lung remains clear. No significant pleural effusion.   No evidence of pneumothorax. Increased left lower lobe and left retrocardiac pulmonary opacity. This may represent atelectasis, aspiration, and/or pneumonia. Recommend radiographic follow-up to complete resolution. Ct Head Wo Contrast    Result Date: 8/23/2018  EXAMINATION: CT OF THE HEAD WITHOUT CONTRAST  8/22/2018 10:12 am TECHNIQUE: CT of the head was performed without the administration of intravenous contrast. Dose modulation, iterative reconstruction, and/or weight based adjustment of the mA/kV was utilized to reduce the radiation dose to as low as reasonably achievable. COMPARISON: June 9, 2012 HISTORY: ORDERING SYSTEM PROVIDED HISTORY: numbness, weakness R side TECHNOLOGIST PROVIDED HISTORY: FINDINGS: BRAIN/VENTRICLES: There is no acute intracranial hemorrhage, mass effect or midline shift. No abnormal extra-axial fluid collection. The gray-white differentiation is maintained without evidence of an acute infarct. There is no evidence of hydrocephalus. ORBITS: The visualized portion of the orbits demonstrate no acute abnormality. SINUSES: The visualized paranasal sinuses and mastoid air cells demonstrate no acute abnormality. SOFT TISSUES/SKULL:  No acute abnormality of the visualized skull or soft tissues. No acute intracranial abnormality. Mri Cervical Spine W Wo Contrast    Result Date: 8/22/2018  EXAMINATION: MRI OF THE CERVICAL SPINE WITHOUT AND WITH CONTRAST  8/22/2018 4:36 pm: TECHNIQUE: Multiplanar multisequence MRI of the cervical spine was performed without and with the administration of intravenous contrast. COMPARISON: None. HISTORY: ORDERING SYSTEM PROVIDED HISTORY: R/O C-SPINE MASS, EDEMA FINDINGS: BONES/ALIGNMENT: Cervical vertebral heights and alignment are normal.  Bone marrow signal is normal.  There is no abnormal enhancement. SPINAL CORD: No abnormal cord signal is seen. SOFT TISSUES: No abnormal enhancement of the cervical spine. No paraspinal mass identified.  C2-C3: right carotid bifurcation, with mild extension into the right proximal internal carotid artery. This measures approximately 12 mm in length. This is best seen on series 607, image 77. The left carotid bifurcation is unremarkable. No significant stenosis at the carotid bifurcation on either side. No significant atherosclerotic disease. VERTEBRAL ARTERIES: The vertebral arteries both arise from the subclavian arteries and are normal in caliber without evidence of flow limiting stenosis or dissection. SOFT TISSUES: The visualized lung apices are clear. No evidence of acute soft tissue findings within the neck. The visualized soft tissue structures of the neck are grossly unremarkable. The nasopharynx, oropharynx, hypopharynx, and laryngeal structures are grossly unremarkable. BONES: The visualized osseous structures appear unremarkable. CTA HEAD: ANTERIOR CIRCULATION: The internal carotid arteries are normal in course and caliber without focal stenosis. The anterior cerebral and middle cerebral arteries demonstrate no focal stenosis. POSTERIOR CIRCULATION: The posterior cerebral arteries demonstrate no focal stenosis. The vertebral and basilar arteries appear unremarkable. BRAIN: No mass effect or midline shift. No abnormal extra-axial fluid collection. The gray-white differentiation appears grossly maintained. 1. Linear hypodensity at the right carotid bifurcation, with mild extension into the right internal carotid artery proximally. Findings may represent short-segment dissection. No significant luminal narrowing. No significant distal extension. 2. No high-grade stenosis or focal occlusion involving the cervical, or intracranial vasculature. No evidence of intracranial aneurysm. Findings were discussed with Dr. Tabatha Damon at 11:24am on 8/22/2018.      Cta Head W Contrast    Result Date: 8/23/2018  EXAMINATION: CTA OF THE HEAD WITH CONTRAST; CTA OF THE NECK 8/22/2018 10:59 am: TECHNIQUE: CTA of the osseous structures appear unremarkable. CTA HEAD: ANTERIOR CIRCULATION: The internal carotid arteries are normal in course and caliber without focal stenosis. The anterior cerebral and middle cerebral arteries demonstrate no focal stenosis. POSTERIOR CIRCULATION: The posterior cerebral arteries demonstrate no focal stenosis. The vertebral and basilar arteries appear unremarkable. BRAIN: No mass effect or midline shift. No abnormal extra-axial fluid collection. The gray-white differentiation appears grossly maintained. 1. Linear hypodensity at the right carotid bifurcation, with mild extension into the right internal carotid artery proximally. Findings may represent short-segment dissection. No significant luminal narrowing. No significant distal extension. 2. No high-grade stenosis or focal occlusion involving the cervical, or intracranial vasculature. No evidence of intracranial aneurysm. Findings were discussed with Dr. Tim Valenzuela at 11:24am on 8/22/2018. Mri Brain W Wo Contrast    Result Date: 8/22/2018  EXAMINATION: MRI OF THE BRAIN WITHOUT AND WITH CONTRAST  8/22/2018 4:36 pm TECHNIQUE: Multiplanar multisequence MRI of the head/brain was performed without and with the administration of intravenous contrast. COMPARISON: None. HISTORY: ORDERING SYSTEM PROVIDED HISTORY: STROKE, PROGRESSIVE WEAKNESS R/O MASS, please add fat sat FINDINGS: INTRACRANIAL STRUCTURES/VENTRICLES:  There is no acute infarct. No mass effect or midline shift. No evidence of an acute intracranial hemorrhage. The ventricles and sulci are normal in size and configuration. The sellar/suprasellar regions appear unremarkable. The normal signal voids within the major intracranial vessels appear maintained. No abnormal focus of enhancement is seen within the brain. ORBITS: The visualized portion of the orbits demonstrate no acute abnormality. SINUSES: The visualized paranasal sinuses and mastoid air cells are well aerated.  BONES/SOFT

## 2018-08-24 NOTE — ED PROVIDER NOTES
Dr Galeano Fails sign out, + loc, r side weak, pt admitted with neurology on board      Lorie Richey, DO  08/23/18 2320    Pt wishing to leave ama, tamaras I updated pt risk benefit discussed pt appears to have decision making capacity,        Lorie Richey, DO  08/24/18 [address] [facility] [place], DO  08/24/18 6060

## 2018-08-24 NOTE — ED PROVIDER NOTES
[facility]     Emergency Department     Faculty Note/ Attestation      Pt Name: Elizabeth Ventura                                       MRN: [id number]  [place] 1965  Date of evaluation: 8/23/2018    Patients PCP:    Lakia Dong MD      Attestation  I performed a history and physical examination of the patient and discussed management with the resident. I reviewed the residents note and agree with the documented findings and plan of care. Any areas of disagreement are noted on the chart. I was personally present for the key portions of any procedures. I have documented in the chart those procedures where I was not present during the key portions. I have reviewed the emergency nurses triage note. I agree with the chief complaint, past medical history, past surgical history, allergies, medications, social and family history as documented unless otherwise noted below. For Physician Assistant/ Nurse Practitioner cases/documentation I have personally evaluated this patient and have completed at least one if not all key elements of the E/M (history, physical exam, and MDM). Additional findings are as noted.       Initial Screens:             Vitals:    Vitals:    08/23/18 1950   BP: (!) 161/99   Pulse: 81   Resp: 16   Temp: 98.3 °F (36.8 °C)   TempSrc: Oral   SpO2: 100%       CHIEF COMPLAINT       Chief Complaint   Patient presents with    Loss of Consciousness             DIAGNOSTIC RESULTS             RADIOLOGY:   XR CHEST STANDARD (2 VW)    (Results Pending)         LABS:  Labs Reviewed   CBC WITH AUTO DIFFERENTIAL   BASIC METABOLIC PANEL   APTT   PROTIME-INR   POCT TROPONIN   POCT TROPONIN         EMERGENCY DEPARTMENT COURSE:     -------------------------  BP: (!) 161/99, Temp: 98.3 °F (36.8 °C), Pulse: 81, Resp: 16      Comments    D/c today from neurology after stroke workup  residual R sided wkness despite neg MRI and full neuro w/u  Went home today, had LOC while sitting in

## 2018-08-24 NOTE — ED NOTES
Pt states she no longer wants to be admitted. States she wants to go home & go to sleep. Pt educated on reasons for admission. Verbalized understanding but still stated she wants to go home.       Addison Gillis RN  08/24/18 0600

## 2018-08-25 PROBLEM — Z12.11 COLON CANCER SCREENING: Status: RESOLVED | Noted: 2018-03-13 | Resolved: 2018-08-25

## 2018-08-31 ENCOUNTER — OFFICE VISIT (OUTPATIENT)
Dept: INTERNAL MEDICINE | Age: 53
End: 2018-08-31
Payer: MEDICARE

## 2018-08-31 VITALS
OXYGEN SATURATION: 99 % | BODY MASS INDEX: 29.12 KG/M2 | WEIGHT: 175 LBS | SYSTOLIC BLOOD PRESSURE: 135 MMHG | HEART RATE: 77 BPM | DIASTOLIC BLOOD PRESSURE: 89 MMHG

## 2018-08-31 DIAGNOSIS — Z12.31 ENCOUNTER FOR SCREENING MAMMOGRAM FOR BREAST CANCER: ICD-10-CM

## 2018-08-31 DIAGNOSIS — I10 ESSENTIAL HYPERTENSION: ICD-10-CM

## 2018-08-31 DIAGNOSIS — I63.9 ISCHEMIC STROKE (HCC): Primary | ICD-10-CM

## 2018-08-31 PROCEDURE — G8419 CALC BMI OUT NRM PARAM NOF/U: HCPCS | Performed by: INTERNAL MEDICINE

## 2018-08-31 PROCEDURE — G8598 ASA/ANTIPLAT THER USED: HCPCS | Performed by: INTERNAL MEDICINE

## 2018-08-31 PROCEDURE — 99203 OFFICE O/P NEW LOW 30 MIN: CPT | Performed by: INTERNAL MEDICINE

## 2018-08-31 PROCEDURE — G8427 DOCREV CUR MEDS BY ELIG CLIN: HCPCS | Performed by: INTERNAL MEDICINE

## 2018-08-31 PROCEDURE — 1036F TOBACCO NON-USER: CPT | Performed by: INTERNAL MEDICINE

## 2018-08-31 PROCEDURE — 1111F DSCHRG MED/CURRENT MED MERGE: CPT | Performed by: INTERNAL MEDICINE

## 2018-08-31 PROCEDURE — 3017F COLORECTAL CA SCREEN DOC REV: CPT | Performed by: INTERNAL MEDICINE

## 2018-08-31 RX ORDER — CHLORHEXIDINE GLUCONATE 0.12 MG/ML
15 RINSE ORAL 2 TIMES DAILY
Qty: 420 ML | Refills: 0 | Status: SHIPPED | OUTPATIENT
Start: 2018-08-31 | End: 2018-09-14

## 2018-08-31 RX ORDER — ACETAMINOPHEN 160 MG
2000 TABLET,DISINTEGRATING ORAL DAILY
Qty: 30 CAPSULE | Refills: 5 | Status: SHIPPED | OUTPATIENT
Start: 2018-08-31 | End: 2020-01-15 | Stop reason: SDUPTHER

## 2018-08-31 RX ORDER — AMLODIPINE BESYLATE 5 MG/1
5 TABLET ORAL 2 TIMES DAILY
Qty: 30 TABLET | Refills: 5 | Status: SHIPPED | OUTPATIENT
Start: 2018-08-31 | End: 2018-12-12 | Stop reason: SDUPTHER

## 2018-08-31 RX ORDER — PROMETHAZINE HYDROCHLORIDE 12.5 MG/1
12.5 TABLET ORAL
COMMUNITY
End: 2018-09-07 | Stop reason: SDUPTHER

## 2018-08-31 RX ORDER — ATORVASTATIN CALCIUM 40 MG/1
40 TABLET, FILM COATED ORAL NIGHTLY
Qty: 30 TABLET | Refills: 3 | Status: SHIPPED | OUTPATIENT
Start: 2018-08-31 | End: 2019-08-01 | Stop reason: SDUPTHER

## 2018-08-31 RX ORDER — ESTRADIOL AND NORETHINDRONE ACETATE .5; .1 MG/1; MG/1
TABLET ORAL
COMMUNITY
End: 2018-12-12

## 2018-08-31 RX ORDER — ACETAMINOPHEN 160 MG
2000 TABLET,DISINTEGRATING ORAL
COMMUNITY
Start: 2017-02-17 | End: 2018-08-31 | Stop reason: SDUPTHER

## 2018-08-31 RX ORDER — ASPIRIN 81 MG/1
81 TABLET ORAL DAILY
Qty: 30 TABLET | Refills: 3 | Status: SHIPPED | OUTPATIENT
Start: 2018-08-31 | End: 2019-02-13 | Stop reason: SDUPTHER

## 2018-08-31 ASSESSMENT — PATIENT HEALTH QUESTIONNAIRE - PHQ9
1. LITTLE INTEREST OR PLEASURE IN DOING THINGS: 0
SUM OF ALL RESPONSES TO PHQ9 QUESTIONS 1 & 2: 0
SUM OF ALL RESPONSES TO PHQ QUESTIONS 1-9: 0
SUM OF ALL RESPONSES TO PHQ QUESTIONS 1-9: 0
2. FEELING DOWN, DEPRESSED OR HOPELESS: 0

## 2018-08-31 ASSESSMENT — ENCOUNTER SYMPTOMS
RESPIRATORY NEGATIVE: 1
ALLERGIC/IMMUNOLOGIC NEGATIVE: 1
GASTROINTESTINAL NEGATIVE: 1
EYES NEGATIVE: 1

## 2018-08-31 NOTE — PROGRESS NOTES
tablet Take 12.5 mg by mouth      fluticasone-salmeterol (ADVAIR) 250-50 MCG/DOSE AEPB Inhale 1 puff into the lungs daily. No current facility-administered medications for this visit. Allergies   Allergen Reactions    Sulfa Antibiotics Itching       Health Maintenance   Topic Date Due    Cervical cancer screen  07/03/1987    Breast cancer screen  07/03/2016    Colon cancer screen colonoscopy  07/03/2016    Pneumococcal med risk (1 of 1 - PPSV23) 11/30/2018 (Originally 7/3/1985)    Shingles Vaccine (1 of 2 - 2 Dose Series) 11/30/2018 (Originally 7/3/2016)    HIV screen  01/17/2019 (Originally 7/3/1981)    Flu vaccine (1) 09/01/2018    DTaP/Tdap/Td vaccine (3 - Td) 04/12/2023    Lipid screen  08/23/2023       Controlled Substances Monitoring:      HPI:     Hypertension   This is a chronic problem. The current episode started more than 1 year ago. The problem has been gradually improving since onset. The problem is controlled. There are no associated agents to hypertension. Risk factors for coronary artery disease include dyslipidemia. Past treatments include calcium channel blockers. The current treatment provides significant improvement. There are no compliance problems. Hypertensive end-organ damage includes CVA. Neurologic Problem   The patient's primary symptoms include focal sensory loss and focal weakness. This is a new problem. The current episode started 1 to 4 weeks ago. The neurological problem developed suddenly. The problem has been gradually improving since onset. There was right-sided focality noted. Past treatments include aspirin. The treatment provided moderate relief. Her past medical history is significant for a CVA (L sided weakness). MRI of brain was normal.    Was texting the entire visit. ROS:     Review of Systems   Constitutional: Negative. HENT: Negative. Eyes: Negative. Respiratory: Negative. Cardiovascular: Negative.     Gastrointestinal: Negative. Endocrine: Negative. Genitourinary: Negative. Musculoskeletal: Negative. Skin: Negative. Allergic/Immunologic: Negative. Neurological: Positive for focal weakness. Hematological: Negative. Psychiatric/Behavioral: Negative. Objective:     Physical Exam   Constitutional: She is oriented to person, place, and time. She appears well-developed and well-nourished. HENT:   Head: Normocephalic and atraumatic. Neck: Neck supple. Cardiovascular: Normal rate and regular rhythm. Pulmonary/Chest: Effort normal and breath sounds normal.   Abdominal: Soft. Musculoskeletal: She exhibits no edema. Neurological: She is alert and oriented to person, place, and time. Skin: Skin is warm and dry. Psychiatric: She has a normal mood and affect. Her behavior is normal.   Vitals reviewed. Visit Information    Have you changed or started any medications since your last visit including any over-the-counter medicines, vitamins, or herbal medicines? no   Are you having any side effects from any of your medications? -  no  Have you stopped taking any of your medications? Is so, why? -  no    Have you seen any other physician or provider since your last visit? Yes - Records Obtained  Have you had any other diagnostic tests since your last visit? Yes - Records Obtained  Have you been seen in the emergency room and/or had an admission to a hospital since we last saw you? Yes - Records Obtained  Have you had your routine dental cleaning in the past 6 months? yes -     Have you activated your [employer] account? If not, what are your barriers?  Yes     Patient Care Team:  Raghav Buchanan MD as PCP - General (Internal Medicine)    Medical History Review  Past Medical, Family, and Social History reviewed and does contribute to the patient presenting condition    Health Maintenance   Topic Date Due    Cervical cancer screen  07/03/1987    Breast cancer screen  07/03/2016    Colon cancer screen

## 2018-09-07 RX ORDER — PROMETHAZINE HYDROCHLORIDE 12.5 MG/1
TABLET ORAL
Qty: 30 TABLET | Refills: 0 | Status: SHIPPED | OUTPATIENT
Start: 2018-09-07 | End: 2018-11-27 | Stop reason: SDUPTHER

## 2018-09-07 NOTE — TELEPHONE ENCOUNTER
Next Visit Date:  Future Appointments  Date Time Provider Jose Montezi   9/25/2018 10:30 AM [facility] RM 1 [facility]iolog   10/2/2018 8:40 AM JESIKA Steven - CNP Neuro Spec [id number]   11/30/2018 8:40 AM Breann Morton MD [facility] [address] Maintenance   Topic Date Due    Cervical cancer screen  07/03/1987    Breast cancer screen  07/03/2016    Colon cancer screen colonoscopy  07/03/2016    Flu vaccine (1) 09/01/2018    Pneumococcal med risk (1 of 1 - PPSV23) 11/30/2018 (Originally 7/3/1985)    Shingles Vaccine (1 of 2 - 2 Dose Series) 11/30/2018 (Originally 7/3/2016)    HIV screen  01/17/2019 (Originally 7/3/1981)    Potassium monitoring  08/23/2019    Creatinine monitoring  08/23/2019    DTaP/Tdap/Td vaccine (3 - Td) 04/12/2023    Lipid screen  08/23/2023       Hemoglobin A1C (%)   Date Value   08/22/2018 4.8             ( goal A1C is < 7)   No results found for: LABMICR  LDL Cholesterol (mg/dL)   Date Value   08/23/2018 127       (goal LDL is <100)   AST (U/L)   Date Value   08/23/2018 15     ALT (U/L)   Date Value   08/23/2018 13     BUN (mg/dL)   Date Value   08/23/2018 8     BP Readings from Last 3 Encounters:   08/31/18 135/89   08/24/18 (!) 150/96   08/23/18 (!) 136/96          (goal 120/80)    All Future Testing planned in [facility]  Lab Frequency Next Occurrence   KARON Digital Screen Bilateral [[id number]] Once 09/30/2018               Patient Active Problem List:     Calculus (=stone)     Essential hypertension     Asthma     Syncope and collapse     Ischemic stroke (HCC)     Right sided weakness     Meralgia paresthetica of right side     Acute right hemiparesis (HCC)     Paresthesias

## 2018-10-22 ENCOUNTER — OFFICE VISIT (OUTPATIENT)
Dept: INTERNAL MEDICINE | Age: 53
End: 2018-10-22
Payer: MEDICARE

## 2018-10-22 VITALS
WEIGHT: 177 LBS | OXYGEN SATURATION: 100 % | BODY MASS INDEX: 29.45 KG/M2 | DIASTOLIC BLOOD PRESSURE: 98 MMHG | HEART RATE: 65 BPM | SYSTOLIC BLOOD PRESSURE: 139 MMHG

## 2018-10-22 DIAGNOSIS — Z00.00 WELL ADULT EXAM: ICD-10-CM

## 2018-10-22 DIAGNOSIS — Z12.31 ENCOUNTER FOR SCREENING MAMMOGRAM FOR BREAST CANCER: ICD-10-CM

## 2018-10-22 DIAGNOSIS — Z23 FLU VACCINE NEED: Primary | ICD-10-CM

## 2018-10-22 DIAGNOSIS — I10 ESSENTIAL HYPERTENSION: ICD-10-CM

## 2018-10-22 DIAGNOSIS — Z11.1 PPD SCREENING TEST: ICD-10-CM

## 2018-10-22 DIAGNOSIS — Z12.11 SCREENING FOR COLON CANCER: ICD-10-CM

## 2018-10-22 DIAGNOSIS — E78.00 HYPERCHOLESTEREMIA: ICD-10-CM

## 2018-10-22 PROCEDURE — 90471 IMMUNIZATION ADMIN: CPT | Performed by: INTERNAL MEDICINE

## 2018-10-22 PROCEDURE — 90688 IIV4 VACCINE SPLT 0.5 ML IM: CPT | Performed by: INTERNAL MEDICINE

## 2018-10-22 PROCEDURE — G8482 FLU IMMUNIZE ORDER/ADMIN: HCPCS | Performed by: INTERNAL MEDICINE

## 2018-10-22 PROCEDURE — 99396 PREV VISIT EST AGE 40-64: CPT | Performed by: INTERNAL MEDICINE

## 2018-10-22 RX ORDER — CLOPIDOGREL BISULFATE 75 MG/1
TABLET ORAL
COMMUNITY
Start: 2018-09-21 | End: 2019-01-19 | Stop reason: SDUPTHER

## 2018-10-22 ASSESSMENT — ENCOUNTER SYMPTOMS
ALLERGIC/IMMUNOLOGIC NEGATIVE: 1
EYES NEGATIVE: 1
RESPIRATORY NEGATIVE: 1
GASTROINTESTINAL NEGATIVE: 1

## 2018-10-24 ENCOUNTER — OFFICE VISIT (OUTPATIENT)
Dept: INTERNAL MEDICINE | Age: 53
End: 2018-10-24

## 2018-10-24 DIAGNOSIS — Z11.1 ENCOUNTER FOR PPD SKIN TEST READING: Primary | ICD-10-CM

## 2018-10-29 ENCOUNTER — OFFICE VISIT (OUTPATIENT)
Dept: INTERNAL MEDICINE | Age: 53
End: 2018-10-29
Payer: MEDICARE

## 2018-10-29 VITALS — WEIGHT: 177 LBS | BODY MASS INDEX: 29.49 KG/M2 | HEIGHT: 65 IN

## 2018-10-29 DIAGNOSIS — Z11.1 PPD SCREENING TEST: Primary | ICD-10-CM

## 2018-10-29 PROCEDURE — 86580 TB INTRADERMAL TEST: CPT | Performed by: INTERNAL MEDICINE

## 2018-10-29 PROCEDURE — G8419 CALC BMI OUT NRM PARAM NOF/U: HCPCS | Performed by: INTERNAL MEDICINE

## 2018-10-29 PROCEDURE — 3017F COLORECTAL CA SCREEN DOC REV: CPT | Performed by: INTERNAL MEDICINE

## 2018-10-29 PROCEDURE — G8427 DOCREV CUR MEDS BY ELIG CLIN: HCPCS | Performed by: INTERNAL MEDICINE

## 2018-10-31 ENCOUNTER — OFFICE VISIT (OUTPATIENT)
Dept: INTERNAL MEDICINE | Age: 53
End: 2018-10-31
Payer: MEDICARE

## 2018-10-31 DIAGNOSIS — Z11.1 ENCOUNTER FOR PPD SKIN TEST READING: ICD-10-CM

## 2018-10-31 PROCEDURE — G8428 CUR MEDS NOT DOCUMENT: HCPCS | Performed by: INTERNAL MEDICINE

## 2018-10-31 PROCEDURE — 86580 TB INTRADERMAL TEST: CPT | Performed by: INTERNAL MEDICINE

## 2018-10-31 PROCEDURE — 3017F COLORECTAL CA SCREEN DOC REV: CPT | Performed by: INTERNAL MEDICINE

## 2018-10-31 PROCEDURE — G8419 CALC BMI OUT NRM PARAM NOF/U: HCPCS | Performed by: INTERNAL MEDICINE

## 2018-11-06 ENCOUNTER — TELEPHONE (OUTPATIENT)
Dept: INTERNAL MEDICINE | Age: 53
End: 2018-11-06

## 2018-11-06 NOTE — LETTER
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 Weight:177 lb  BP:139/98  Pulse: 65    No results found for: PPD, INDURATION  This test was negative for tuberculosis exposure per current Centers for Disease Control guidelines. First step: 10/22/18  Reading: 10/24/18  Second step: 10/29/18 Reading: 10/31/18    A chest x-ray was not required. If you have any questions or concerns, please don't hesitate to call.     Sincerely,        Jarett Carmen MD

## 2018-11-07 ENCOUNTER — OFFICE VISIT (OUTPATIENT)
Dept: INTERNAL MEDICINE | Age: 53
End: 2018-11-07
Payer: MEDICARE

## 2018-11-07 VITALS
WEIGHT: 176 LBS | BODY MASS INDEX: 29.29 KG/M2 | DIASTOLIC BLOOD PRESSURE: 98 MMHG | OXYGEN SATURATION: 98 % | SYSTOLIC BLOOD PRESSURE: 149 MMHG | HEART RATE: 71 BPM

## 2018-11-07 DIAGNOSIS — G81.91 ACUTE RIGHT HEMIPARESIS (HCC): ICD-10-CM

## 2018-11-07 DIAGNOSIS — I10 ESSENTIAL HYPERTENSION: Primary | ICD-10-CM

## 2018-11-07 PROCEDURE — G8419 CALC BMI OUT NRM PARAM NOF/U: HCPCS | Performed by: INTERNAL MEDICINE

## 2018-11-07 PROCEDURE — 99214 OFFICE O/P EST MOD 30 MIN: CPT | Performed by: INTERNAL MEDICINE

## 2018-11-07 PROCEDURE — 3017F COLORECTAL CA SCREEN DOC REV: CPT | Performed by: INTERNAL MEDICINE

## 2018-11-07 PROCEDURE — G8482 FLU IMMUNIZE ORDER/ADMIN: HCPCS | Performed by: INTERNAL MEDICINE

## 2018-11-07 PROCEDURE — G8427 DOCREV CUR MEDS BY ELIG CLIN: HCPCS | Performed by: INTERNAL MEDICINE

## 2018-11-07 PROCEDURE — 1036F TOBACCO NON-USER: CPT | Performed by: INTERNAL MEDICINE

## 2018-11-07 PROCEDURE — G8598 ASA/ANTIPLAT THER USED: HCPCS | Performed by: INTERNAL MEDICINE

## 2018-11-07 ASSESSMENT — ENCOUNTER SYMPTOMS
GASTROINTESTINAL NEGATIVE: 1
ALLERGIC/IMMUNOLOGIC NEGATIVE: 1
RESPIRATORY NEGATIVE: 1
EYES NEGATIVE: 1

## 2018-11-07 NOTE — PROGRESS NOTES
Allergies   Allergen Reactions    Sulfa Antibiotics Itching       Health Maintenance   Topic Date Due    Cervical cancer screen  07/03/1987    Breast cancer screen  07/03/2016    Colon cancer screen colonoscopy  11/22/2018 (Originally 7/3/2016)    Pneumococcal med risk (1 of 1 - PPSV23) 11/30/2018 (Originally 7/3/1985)    Shingles Vaccine (1 of 2 - 2 Dose Series) 11/30/2018 (Originally 7/3/2016)    HIV screen  01/17/2019 (Originally 7/3/1981)    Potassium monitoring  08/23/2019    Creatinine monitoring  08/23/2019    DTaP/Tdap/Td vaccine (3 - Td) 04/12/2023    Lipid screen  08/23/2023    Flu vaccine  Completed       Controlled Substances Monitoring:      HPI:     Hypertension   This is a chronic problem. The current episode started more than 1 year ago. The problem is unchanged. The problem is controlled. Risk factors for coronary artery disease include dyslipidemia. Past treatments include calcium channel blockers. The current treatment provides moderate improvement. There are no compliance problems. Hypertensive end-organ damage includes CVA. Neurologic Problem   The patient's primary symptoms include focal weakness. This is a chronic problem. The current episode started more than 1 year ago. The neurological problem developed suddenly. There was right-sided focality noted. Past treatments include medication. The treatment provided moderate relief. Her past medical history is significant for a CVA. Had reaction to eyedrops on October 31 st and was unable to return to work until tomorrow. ROS:     Review of Systems   Constitutional: Negative. HENT: Negative. Eyes: Negative. Respiratory: Negative. Cardiovascular: Negative. Gastrointestinal: Negative. Endocrine: Negative. Genitourinary: Negative. Musculoskeletal: Negative. Skin: Negative. Allergic/Immunologic: Negative. Neurological: Positive for focal weakness. Hematological: Negative. Psychiatric/Behavioral: Negative. All other systems reviewed and are negative. Objective:     Physical Exam   Constitutional: She is oriented to person, place, and time. She appears well-developed and well-nourished. HENT:   Head: Normocephalic and atraumatic. Neck: Neck supple. Cardiovascular: Normal rate and regular rhythm. Pulmonary/Chest: Effort normal and breath sounds normal.   Abdominal: Soft. Musculoskeletal: She exhibits no edema. Neurological: She is alert and oriented to person, place, and time. Skin: Skin is warm and dry. Psychiatric: She has a normal mood and affect. Her behavior is normal.   Vitals reviewed. Visit Information    Have you changed or started any medications since your last visit including any over-the-counter medicines, vitamins, or herbal medicines? no   Are you having any side effects from any of your medications? -  no  Have you stopped taking any of your medications? Is so, why? -  no    Have you seen any other physician or provider since your last visit? No  Have you had any other diagnostic tests since your last visit? No  Have you been seen in the emergency room and/or had an admission to a hospital since we last saw you? No  Have you had your routine dental cleaning in the past 6 months? no    Have you activated your [employer] account? If not, what are your barriers?  Yes     Patient Care Team:  Jackelyn Varela MD as PCP - General (Internal Medicine)    Medical History Review  Past Medical, Family, and Social History reviewed and does contribute to the patient presenting condition    Health Maintenance   Topic Date Due    Cervical cancer screen  07/03/1987    Breast cancer screen  07/03/2016    Colon cancer screen colonoscopy  11/22/2018 (Originally 7/3/2016)    Pneumococcal med risk (1 of 1 - PPSV23) 11/30/2018 (Originally 7/3/1985)    Shingles Vaccine (1 of 2 - 2 Dose Series) 11/30/2018 (Originally 7/3/2016)    HIV screen  01/17/2019

## 2018-11-29 RX ORDER — PROMETHAZINE HYDROCHLORIDE 12.5 MG/1
TABLET ORAL
Qty: 30 TABLET | Refills: 0 | Status: SHIPPED | OUTPATIENT
Start: 2018-11-29 | End: 2019-02-01 | Stop reason: SDUPTHER

## 2018-11-30 PROBLEM — Z11.1 ENCOUNTER FOR PPD SKIN TEST READING: Status: RESOLVED | Noted: 2018-10-31 | Resolved: 2018-11-30

## 2018-12-12 ENCOUNTER — OFFICE VISIT (OUTPATIENT)
Dept: PRIMARY CARE CLINIC | Age: 53
End: 2018-12-12
Payer: MEDICARE

## 2018-12-12 VITALS
HEIGHT: 65 IN | SYSTOLIC BLOOD PRESSURE: 138 MMHG | WEIGHT: 179 LBS | OXYGEN SATURATION: 100 % | HEART RATE: 74 BPM | TEMPERATURE: 98.8 F | BODY MASS INDEX: 29.82 KG/M2 | DIASTOLIC BLOOD PRESSURE: 95 MMHG

## 2018-12-12 DIAGNOSIS — I10 ESSENTIAL HYPERTENSION: Primary | ICD-10-CM

## 2018-12-12 DIAGNOSIS — M79.672 PAIN IN BOTH FEET: ICD-10-CM

## 2018-12-12 DIAGNOSIS — B35.1 ONYCHOMYCOSIS OF GREAT TOE: ICD-10-CM

## 2018-12-12 DIAGNOSIS — M79.671 PAIN IN BOTH FEET: ICD-10-CM

## 2018-12-12 PROCEDURE — 99213 OFFICE O/P EST LOW 20 MIN: CPT | Performed by: NURSE PRACTITIONER

## 2018-12-12 RX ORDER — AMLODIPINE BESYLATE 10 MG/1
10 TABLET ORAL DAILY
Qty: 30 TABLET | Refills: 2 | Status: SHIPPED | OUTPATIENT
Start: 2018-12-12 | End: 2019-04-12 | Stop reason: SDUPTHER

## 2018-12-12 ASSESSMENT — ENCOUNTER SYMPTOMS
BLOOD IN STOOL: 0
SHORTNESS OF BREATH: 0
NAUSEA: 1
ORTHOPNEA: 0
CHOKING: 0
TROUBLE SWALLOWING: 0

## 2018-12-12 NOTE — PATIENT INSTRUCTIONS
Patient Education        Plantar Fasciitis: Exercises  Your Care Instructions  Here are some examples of typical rehabilitation exercises for your condition. Start each exercise slowly. Ease off the exercise if you start to have pain. Your doctor or physical therapist will tell you when you can start these exercises and which ones will work best for you. How to do the exercises  Towel stretch    1. Sit with your legs extended and knees straight. 2. Place a towel around your foot just under the toes. 3. Hold each end of the towel in each hand, with your hands above your knees. 4. Pull back with the towel so that your foot stretches toward you. 5. Hold the position for at least 15 to 30 seconds. 6. Repeat 2 to 4 times a session, up to 5 sessions a day. Calf stretch    1. Stand facing a wall with your hands on the wall at about eye level. Put the leg you want to stretch about a step behind your other leg. 2. Keeping your back heel on the floor, bend your front knee until you feel a stretch in the back leg. 3. Hold the stretch for 15 to 30 seconds. Repeat 2 to 4 times. Plantar fascia and calf stretch    1. Stand on a step as shown above. Be sure to hold on to the banister. 2. Slowly let your heels down over the edge of the step as you relax your calf muscles. You should feel a gentle stretch across the bottom of your foot and up the back of your leg to your knee. 3. Hold the stretch about 15 to 30 seconds, and then tighten your calf muscle a little to bring your heel back up to the level of the step. Repeat 2 to 4 times. Towel curls    1. While sitting, place your foot on a towel on the floor and scrunch the towel toward you with your toes. 2. Then, also using your toes, push the towel away from you. [place] pickups    1. Put marbles on the floor next to a cup.  2. Using your toes, try to lift the marbles up from the floor and put them in the cup.     Follow-up care is a key part of your

## 2019-01-08 ENCOUNTER — TELEPHONE (OUTPATIENT)
Dept: OBGYN | Age: 54
End: 2019-01-08

## 2019-01-14 ENCOUNTER — OFFICE VISIT (OUTPATIENT)
Dept: NEUROLOGY | Age: 54
End: 2019-01-14
Payer: MEDICARE

## 2019-01-14 VITALS
WEIGHT: 178 LBS | SYSTOLIC BLOOD PRESSURE: 142 MMHG | DIASTOLIC BLOOD PRESSURE: 86 MMHG | HEIGHT: 67 IN | BODY MASS INDEX: 27.94 KG/M2 | HEART RATE: 85 BPM

## 2019-01-14 DIAGNOSIS — R53.1 RIGHT SIDED WEAKNESS: Primary | ICD-10-CM

## 2019-01-14 PROCEDURE — G8482 FLU IMMUNIZE ORDER/ADMIN: HCPCS | Performed by: STUDENT IN AN ORGANIZED HEALTH CARE EDUCATION/TRAINING PROGRAM

## 2019-01-14 PROCEDURE — G8419 CALC BMI OUT NRM PARAM NOF/U: HCPCS | Performed by: STUDENT IN AN ORGANIZED HEALTH CARE EDUCATION/TRAINING PROGRAM

## 2019-01-14 PROCEDURE — 3017F COLORECTAL CA SCREEN DOC REV: CPT | Performed by: STUDENT IN AN ORGANIZED HEALTH CARE EDUCATION/TRAINING PROGRAM

## 2019-01-14 PROCEDURE — 1036F TOBACCO NON-USER: CPT | Performed by: STUDENT IN AN ORGANIZED HEALTH CARE EDUCATION/TRAINING PROGRAM

## 2019-01-14 PROCEDURE — G8427 DOCREV CUR MEDS BY ELIG CLIN: HCPCS | Performed by: STUDENT IN AN ORGANIZED HEALTH CARE EDUCATION/TRAINING PROGRAM

## 2019-01-14 PROCEDURE — G8598 ASA/ANTIPLAT THER USED: HCPCS | Performed by: STUDENT IN AN ORGANIZED HEALTH CARE EDUCATION/TRAINING PROGRAM

## 2019-01-14 PROCEDURE — 99205 OFFICE O/P NEW HI 60 MIN: CPT | Performed by: STUDENT IN AN ORGANIZED HEALTH CARE EDUCATION/TRAINING PROGRAM

## 2019-01-14 ASSESSMENT — ENCOUNTER SYMPTOMS
ABDOMINAL PAIN: 1
SINUS PAIN: 0
DIARRHEA: 0
PHOTOPHOBIA: 0
CONSTIPATION: 0
SORE THROAT: 0
EYE PAIN: 0
SHORTNESS OF BREATH: 0
VOMITING: 0
NAUSEA: 0
EYE DISCHARGE: 0
EYE REDNESS: 0
COUGH: 0

## 2019-01-21 RX ORDER — CLOPIDOGREL BISULFATE 75 MG/1
TABLET ORAL
Qty: 30 TABLET | Refills: 3 | Status: SHIPPED | OUTPATIENT
Start: 2019-01-21 | End: 2019-05-31 | Stop reason: SDUPTHER

## 2019-01-27 ENCOUNTER — HOSPITAL ENCOUNTER (EMERGENCY)
Age: 54
Discharge: HOME | End: 2019-01-28
Attending: EMERGENCY MEDICINE
Payer: COMMERCIAL

## 2019-01-27 VITALS
DIASTOLIC BLOOD PRESSURE: 91 MMHG | HEART RATE: 76 BPM | SYSTOLIC BLOOD PRESSURE: 131 MMHG | OXYGEN SATURATION: 100 % | TEMPERATURE: 98.8 F

## 2019-01-27 DIAGNOSIS — S39.012A STRAIN OF LUMBAR REGION, INITIAL ENCOUNTER: Primary | ICD-10-CM

## 2019-01-27 PROCEDURE — 99283 EMERGENCY DEPT VISIT LOW MDM: CPT

## 2019-01-27 ASSESSMENT — PAIN DESCRIPTION - LOCATION: LOCATION: BACK

## 2019-01-27 ASSESSMENT — PAIN DESCRIPTION - PAIN TYPE: TYPE: ACUTE PAIN

## 2019-01-27 ASSESSMENT — PAIN SCALES - GENERAL: PAINLEVEL_OUTOF10: 10

## 2019-01-27 ASSESSMENT — PAIN DESCRIPTION - ORIENTATION: ORIENTATION: LOWER

## 2019-01-28 PROCEDURE — 6370000000 HC RX 637 (ALT 250 FOR IP): Performed by: EMERGENCY MEDICINE

## 2019-01-28 RX ORDER — CYCLOBENZAPRINE HCL 10 MG
10 TABLET ORAL 3 TIMES DAILY PRN
Qty: 30 TABLET | Refills: 0 | Status: SHIPPED | OUTPATIENT
Start: 2019-01-28 | End: 2019-02-07

## 2019-01-28 RX ORDER — IBUPROFEN 400 MG/1
400 TABLET ORAL EVERY 6 HOURS PRN
Qty: 30 TABLET | Refills: 0 | Status: ON HOLD | OUTPATIENT
Start: 2019-01-28 | End: 2019-02-21 | Stop reason: ALTCHOICE

## 2019-01-28 RX ORDER — ACETAMINOPHEN 500 MG
1000 TABLET ORAL EVERY 6 HOURS PRN
Qty: 60 TABLET | Refills: 0 | Status: SHIPPED | OUTPATIENT
Start: 2019-01-28 | End: 2020-05-21

## 2019-01-28 RX ORDER — ACETAMINOPHEN 500 MG
1000 TABLET ORAL ONCE
Status: COMPLETED | OUTPATIENT
Start: 2019-01-28 | End: 2019-01-28

## 2019-01-28 RX ORDER — IBUPROFEN 400 MG/1
400 TABLET ORAL ONCE
Status: COMPLETED | OUTPATIENT
Start: 2019-01-28 | End: 2019-01-28

## 2019-01-28 RX ADMIN — IBUPROFEN 400 MG: 400 TABLET, FILM COATED ORAL at 00:16

## 2019-01-28 RX ADMIN — ACETAMINOPHEN 1000 MG: 500 TABLET ORAL at 00:16

## 2019-01-28 ASSESSMENT — ENCOUNTER SYMPTOMS
SHORTNESS OF BREATH: 0
VOMITING: 0
BACK PAIN: 1
ABDOMINAL PAIN: 0
NAUSEA: 0

## 2019-01-28 ASSESSMENT — PAIN SCALES - GENERAL: PAINLEVEL_OUTOF10: 8

## 2019-02-04 RX ORDER — PROMETHAZINE HYDROCHLORIDE 12.5 MG/1
TABLET ORAL
Qty: 30 TABLET | Refills: 0 | Status: SHIPPED | OUTPATIENT
Start: 2019-02-04 | End: 2019-04-02 | Stop reason: SDUPTHER

## 2019-02-14 ENCOUNTER — HOSPITAL ENCOUNTER (OUTPATIENT)
Dept: CT IMAGING | Age: 54
Discharge: HOME | End: 2019-02-16
Payer: MEDICARE

## 2019-02-14 ENCOUNTER — HOSPITAL ENCOUNTER (OUTPATIENT)
Dept: INTERVENTIONAL RADIOLOGY/VASCULAR | Age: 54
Discharge: HOME | End: 2019-02-16
Payer: MEDICARE

## 2019-02-14 DIAGNOSIS — R53.1 RIGHT SIDED WEAKNESS: ICD-10-CM

## 2019-02-14 PROCEDURE — 6360000004 HC RX CONTRAST MEDICATION: Performed by: INTERNAL MEDICINE

## 2019-02-14 PROCEDURE — 2709999900 IR US GUIDED NEEDLE PLACEMENT

## 2019-02-14 PROCEDURE — 36573 INSJ PICC RS&I 5 YR+: CPT | Performed by: RADIOLOGY

## 2019-02-14 PROCEDURE — 70496 CT ANGIOGRAPHY HEAD: CPT

## 2019-02-14 PROCEDURE — 2580000003 HC RX 258: Performed by: INTERNAL MEDICINE

## 2019-02-14 PROCEDURE — 70498 CT ANGIOGRAPHY NECK: CPT

## 2019-02-14 RX ORDER — SODIUM CHLORIDE 0.9 % (FLUSH) 0.9 %
10 SYRINGE (ML) INJECTION PRN
Status: DISCONTINUED | OUTPATIENT
Start: 2019-02-14 | End: 2019-02-17 | Stop reason: HOSPADM

## 2019-02-14 RX ORDER — 0.9 % SODIUM CHLORIDE 0.9 %
80 INTRAVENOUS SOLUTION INTRAVENOUS ONCE
Status: COMPLETED | OUTPATIENT
Start: 2019-02-14 | End: 2019-02-14

## 2019-02-14 RX ADMIN — IOVERSOL 75 ML: 741 INJECTION INTRA-ARTERIAL; INTRAVENOUS at 09:41

## 2019-02-14 RX ADMIN — Medication 10 ML: at 09:41

## 2019-02-14 RX ADMIN — SODIUM CHLORIDE 80 ML: 9 INJECTION, SOLUTION INTRAVENOUS at 09:41

## 2019-02-15 ENCOUNTER — OFFICE VISIT (OUTPATIENT)
Dept: NEUROLOGY | Age: 54
End: 2019-02-15
Payer: MEDICARE

## 2019-02-15 VITALS
DIASTOLIC BLOOD PRESSURE: 89 MMHG | BODY MASS INDEX: 29.74 KG/M2 | HEART RATE: 87 BPM | WEIGHT: 178.5 LBS | HEIGHT: 65 IN | SYSTOLIC BLOOD PRESSURE: 139 MMHG

## 2019-02-15 DIAGNOSIS — R29.898 RIGHT LEG WEAKNESS: ICD-10-CM

## 2019-02-15 DIAGNOSIS — I95.1 ORTHOSTATIC HYPOTENSION: ICD-10-CM

## 2019-02-15 DIAGNOSIS — I77.71 INTERNAL CAROTID ARTERY DISSECTION (HCC): ICD-10-CM

## 2019-02-15 DIAGNOSIS — R55 SYNCOPE, UNSPECIFIED SYNCOPE TYPE: Primary | ICD-10-CM

## 2019-02-15 PROCEDURE — 1036F TOBACCO NON-USER: CPT | Performed by: NEUROLOGICAL SURGERY

## 2019-02-15 PROCEDURE — G8598 ASA/ANTIPLAT THER USED: HCPCS | Performed by: NEUROLOGICAL SURGERY

## 2019-02-15 PROCEDURE — 3017F COLORECTAL CA SCREEN DOC REV: CPT | Performed by: NEUROLOGICAL SURGERY

## 2019-02-15 PROCEDURE — G8482 FLU IMMUNIZE ORDER/ADMIN: HCPCS | Performed by: NEUROLOGICAL SURGERY

## 2019-02-15 PROCEDURE — G8427 DOCREV CUR MEDS BY ELIG CLIN: HCPCS | Performed by: NEUROLOGICAL SURGERY

## 2019-02-15 PROCEDURE — 99214 OFFICE O/P EST MOD 30 MIN: CPT | Performed by: NEUROLOGICAL SURGERY

## 2019-02-15 PROCEDURE — G8419 CALC BMI OUT NRM PARAM NOF/U: HCPCS | Performed by: NEUROLOGICAL SURGERY

## 2019-02-15 RX ORDER — ACETAMINOPHEN/DIPHENHYDRAMINE 500MG-25MG
TABLET ORAL
Qty: 30 TABLET | Refills: 3 | Status: SHIPPED | OUTPATIENT
Start: 2019-02-15 | End: 2019-06-12 | Stop reason: SDUPTHER

## 2019-02-20 ENCOUNTER — TELEPHONE (OUTPATIENT)
Dept: PRIMARY CARE CLINIC | Age: 54
End: 2019-02-20

## 2019-02-21 ENCOUNTER — HOSPITAL ENCOUNTER (OUTPATIENT)
Age: 54
Setting detail: OBSERVATION
Discharge: HOME | End: 2019-02-22
Attending: EMERGENCY MEDICINE | Admitting: EMERGENCY MEDICINE
Payer: MEDICARE

## 2019-02-21 ENCOUNTER — APPOINTMENT (OUTPATIENT)
Dept: GENERAL RADIOLOGY | Age: 54
End: 2019-02-21
Payer: MEDICARE

## 2019-02-21 DIAGNOSIS — R07.89 OTHER CHEST PAIN: Primary | ICD-10-CM

## 2019-02-21 PROBLEM — R07.9 CHEST PAIN: Status: ACTIVE | Noted: 2019-02-21

## 2019-02-21 LAB
ABSOLUTE EOS #: 0 K/UL (ref 0–0.4)
ABSOLUTE IMMATURE GRANULOCYTE: 0 K/UL (ref 0–0.3)
ABSOLUTE LYMPH #: 0.7 K/UL (ref 1–4.8)
ABSOLUTE MONO #: 0.21 K/UL (ref 0.1–0.8)
ANION GAP SERPL CALCULATED.3IONS-SCNC: 12 MMOL/L (ref 9–17)
BASOPHILS # BLD: 0 % (ref 0–2)
BASOPHILS ABSOLUTE: 0 K/UL (ref 0–0.2)
BUN BLDV-MCNC: 11 MG/DL (ref 6–20)
BUN/CREAT BLD: ABNORMAL (ref 9–20)
CALCIUM SERPL-MCNC: 10.5 MG/DL (ref 8.6–10.4)
CHLORIDE BLD-SCNC: 102 MMOL/L (ref 98–107)
CO2: 25 MMOL/L (ref 20–31)
CREAT SERPL-MCNC: 1.1 MG/DL (ref 0.5–0.9)
DIFFERENTIAL TYPE: ABNORMAL
EOSINOPHILS RELATIVE PERCENT: 0 % (ref 1–4)
GFR AFRICAN AMERICAN: >60 ML/MIN
GFR NON-AFRICAN AMERICAN: 52 ML/MIN
GFR SERPL CREATININE-BSD FRML MDRD: ABNORMAL ML/MIN/{1.73_M2}
GFR SERPL CREATININE-BSD FRML MDRD: ABNORMAL ML/MIN/{1.73_M2}
GLUCOSE BLD-MCNC: 81 MG/DL (ref 70–99)
HCT VFR BLD CALC: 39.6 % (ref 36.3–47.1)
HEMOGLOBIN: 12.7 G/DL (ref 11.9–15.1)
IMMATURE GRANULOCYTES: 0 %
LYMPHOCYTES # BLD: 20 % (ref 24–44)
MCH RBC QN AUTO: 28.7 PG (ref 25.2–33.5)
MCHC RBC AUTO-ENTMCNC: 32.1 G/DL (ref 28.4–34.8)
MCV RBC AUTO: 89.6 FL (ref 82.6–102.9)
MONOCYTES # BLD: 6 % (ref 1–7)
MORPHOLOGY: NORMAL
NRBC AUTOMATED: 0 PER 100 WBC
PDW BLD-RTO: 11.6 % (ref 11.8–14.4)
PLATELET # BLD: ABNORMAL K/UL (ref 138–453)
PLATELET ESTIMATE: ABNORMAL
PLATELET, FLUORESCENCE: NORMAL K/UL (ref 138–453)
PLATELET, IMMATURE FRACTION: NORMAL % (ref 1.1–10.3)
PMV BLD AUTO: ABNORMAL FL (ref 8.1–13.5)
POTASSIUM SERPL-SCNC: 4 MMOL/L (ref 3.7–5.3)
RBC # BLD: 4.42 M/UL (ref 3.95–5.11)
RBC # BLD: ABNORMAL 10*6/UL
SEG NEUTROPHILS: 74 % (ref 36–66)
SEGMENTED NEUTROPHILS ABSOLUTE COUNT: 2.59 K/UL (ref 1.8–7.7)
SODIUM BLD-SCNC: 139 MMOL/L (ref 135–144)
TROPONIN INTERP: NORMAL
TROPONIN INTERP: NORMAL
TROPONIN T: NORMAL NG/ML
TROPONIN T: NORMAL NG/ML
TROPONIN, HIGH SENSITIVITY: <6 NG/L (ref 0–14)
TROPONIN, HIGH SENSITIVITY: <6 NG/L (ref 0–14)
WBC # BLD: 3.5 K/UL (ref 3.5–11.3)
WBC # BLD: ABNORMAL 10*3/UL

## 2019-02-21 PROCEDURE — 84484 ASSAY OF TROPONIN QUANT: CPT

## 2019-02-21 PROCEDURE — 6370000000 HC RX 637 (ALT 250 FOR IP): Performed by: STUDENT IN AN ORGANIZED HEALTH CARE EDUCATION/TRAINING PROGRAM

## 2019-02-21 PROCEDURE — 85025 COMPLETE CBC W/AUTO DIFF WBC: CPT

## 2019-02-21 PROCEDURE — G0378 HOSPITAL OBSERVATION PER HR: HCPCS

## 2019-02-21 PROCEDURE — 2580000003 HC RX 258: Performed by: STUDENT IN AN ORGANIZED HEALTH CARE EDUCATION/TRAINING PROGRAM

## 2019-02-21 PROCEDURE — 85055 RETICULATED PLATELET ASSAY: CPT

## 2019-02-21 PROCEDURE — 80048 BASIC METABOLIC PNL TOTAL CA: CPT

## 2019-02-21 PROCEDURE — 93005 ELECTROCARDIOGRAM TRACING: CPT

## 2019-02-21 PROCEDURE — 99285 EMERGENCY DEPT VISIT HI MDM: CPT

## 2019-02-21 PROCEDURE — 71045 X-RAY EXAM CHEST 1 VIEW: CPT

## 2019-02-21 RX ORDER — SODIUM CHLORIDE 0.9 % (FLUSH) 0.9 %
10 SYRINGE (ML) INJECTION PRN
Status: DISCONTINUED | OUTPATIENT
Start: 2019-02-21 | End: 2019-02-22 | Stop reason: HOSPADM

## 2019-02-21 RX ORDER — SODIUM CHLORIDE 0.9 % (FLUSH) 0.9 %
10 SYRINGE (ML) INJECTION EVERY 12 HOURS SCHEDULED
Status: DISCONTINUED | OUTPATIENT
Start: 2019-02-21 | End: 2019-02-22 | Stop reason: HOSPADM

## 2019-02-21 RX ORDER — HYDROCODONE BITARTRATE AND ACETAMINOPHEN 5; 325 MG/1; MG/1
2 TABLET ORAL EVERY 4 HOURS PRN
Status: DISCONTINUED | OUTPATIENT
Start: 2019-02-21 | End: 2019-02-22 | Stop reason: HOSPADM

## 2019-02-21 RX ORDER — AMLODIPINE BESYLATE 10 MG/1
10 TABLET ORAL DAILY
Status: DISCONTINUED | OUTPATIENT
Start: 2019-02-21 | End: 2019-02-22 | Stop reason: HOSPADM

## 2019-02-21 RX ORDER — CLOPIDOGREL BISULFATE 75 MG/1
75 TABLET ORAL ONCE
Status: COMPLETED | OUTPATIENT
Start: 2019-02-21 | End: 2019-02-21

## 2019-02-21 RX ORDER — ACETAMINOPHEN 325 MG/1
650 TABLET ORAL EVERY 4 HOURS PRN
Status: DISCONTINUED | OUTPATIENT
Start: 2019-02-21 | End: 2019-02-22 | Stop reason: HOSPADM

## 2019-02-21 RX ORDER — ASPIRIN 81 MG/1
81 TABLET ORAL DAILY
Status: DISCONTINUED | OUTPATIENT
Start: 2019-02-22 | End: 2019-02-22 | Stop reason: HOSPADM

## 2019-02-21 RX ORDER — ASPIRIN 81 MG/1
243 TABLET, CHEWABLE ORAL ONCE
Status: COMPLETED | OUTPATIENT
Start: 2019-02-21 | End: 2019-02-21

## 2019-02-21 RX ORDER — HYDROCODONE BITARTRATE AND ACETAMINOPHEN 5; 325 MG/1; MG/1
1 TABLET ORAL EVERY 4 HOURS PRN
Status: DISCONTINUED | OUTPATIENT
Start: 2019-02-21 | End: 2019-02-22 | Stop reason: HOSPADM

## 2019-02-21 RX ORDER — ATORVASTATIN CALCIUM 40 MG/1
40 TABLET, FILM COATED ORAL NIGHTLY
Status: DISCONTINUED | OUTPATIENT
Start: 2019-02-21 | End: 2019-02-22 | Stop reason: HOSPADM

## 2019-02-21 RX ORDER — AMLODIPINE BESYLATE 10 MG/1
10 TABLET ORAL DAILY
Status: DISCONTINUED | OUTPATIENT
Start: 2019-02-22 | End: 2019-02-21

## 2019-02-21 RX ORDER — PROMETHAZINE HYDROCHLORIDE 25 MG/1
12.5 TABLET ORAL EVERY 4 HOURS PRN
Status: DISCONTINUED | OUTPATIENT
Start: 2019-02-21 | End: 2019-02-22 | Stop reason: HOSPADM

## 2019-02-21 RX ADMIN — DESMOPRESSIN ACETATE 40 MG: 0.2 TABLET ORAL at 23:48

## 2019-02-21 RX ADMIN — ASPIRIN 81 MG 243 MG: 81 TABLET ORAL at 18:21

## 2019-02-21 RX ADMIN — CLOPIDOGREL 75 MG: 75 TABLET, FILM COATED ORAL at 23:48

## 2019-02-21 RX ADMIN — AMLODIPINE BESYLATE 10 MG: 10 TABLET ORAL at 23:48

## 2019-02-21 RX ADMIN — Medication 10 ML: at 23:51

## 2019-02-21 ASSESSMENT — ENCOUNTER SYMPTOMS
COUGH: 0
ABDOMINAL PAIN: 0
DIARRHEA: 0
SINUS PAIN: 0
VOMITING: 0
RHINORRHEA: 0
SHORTNESS OF BREATH: 1
BLOOD IN STOOL: 0
NAUSEA: 1
APNEA: 0
EYE PAIN: 0
EYE REDNESS: 0
WHEEZING: 0

## 2019-02-21 ASSESSMENT — HEART SCORE: ECG: 1

## 2019-02-21 ASSESSMENT — PAIN DESCRIPTION - LOCATION: LOCATION: OTHER (COMMENT)

## 2019-02-21 ASSESSMENT — PAIN DESCRIPTION - PAIN TYPE: TYPE: ACUTE PAIN

## 2019-02-21 ASSESSMENT — PAIN DESCRIPTION - PROGRESSION: CLINICAL_PROGRESSION: GRADUALLY IMPROVING

## 2019-02-21 ASSESSMENT — PAIN DESCRIPTION - FREQUENCY: FREQUENCY: INTERMITTENT

## 2019-02-21 ASSESSMENT — PAIN DESCRIPTION - DESCRIPTORS: DESCRIPTORS: SQUEEZING

## 2019-02-21 ASSESSMENT — PAIN SCALES - GENERAL: PAINLEVEL_OUTOF10: 7

## 2019-02-22 ENCOUNTER — APPOINTMENT (OUTPATIENT)
Dept: NUCLEAR MEDICINE | Age: 54
End: 2019-02-22
Payer: MEDICARE

## 2019-02-22 VITALS
OXYGEN SATURATION: 96 % | TEMPERATURE: 99.1 F | SYSTOLIC BLOOD PRESSURE: 119 MMHG | RESPIRATION RATE: 20 BRPM | HEIGHT: 65 IN | BODY MASS INDEX: 29.32 KG/M2 | HEART RATE: 84 BPM | WEIGHT: 176 LBS | DIASTOLIC BLOOD PRESSURE: 80 MMHG

## 2019-02-22 LAB
EKG ATRIAL RATE: 85 BPM
EKG ATRIAL RATE: 87 BPM
EKG ATRIAL RATE: 91 BPM
EKG P AXIS: 32 DEGREES
EKG P AXIS: 50 DEGREES
EKG P AXIS: 61 DEGREES
EKG P-R INTERVAL: 158 MS
EKG P-R INTERVAL: 158 MS
EKG P-R INTERVAL: 162 MS
EKG Q-T INTERVAL: 332 MS
EKG Q-T INTERVAL: 358 MS
EKG Q-T INTERVAL: 364 MS
EKG QRS DURATION: 74 MS
EKG QRS DURATION: 82 MS
EKG QRS DURATION: 86 MS
EKG QTC CALCULATION (BAZETT): 408 MS
EKG QTC CALCULATION (BAZETT): 426 MS
EKG QTC CALCULATION (BAZETT): 438 MS
EKG R AXIS: -3 DEGREES
EKG R AXIS: 16 DEGREES
EKG R AXIS: 5 DEGREES
EKG T AXIS: -11 DEGREES
EKG T AXIS: -11 DEGREES
EKG T AXIS: 8 DEGREES
EKG VENTRICULAR RATE: 85 BPM
EKG VENTRICULAR RATE: 87 BPM
EKG VENTRICULAR RATE: 91 BPM
LV EF: 67 %
LVEF MODALITY: NORMAL

## 2019-02-22 PROCEDURE — 93017 CV STRESS TEST TRACING ONLY: CPT | Performed by: NURSE PRACTITIONER

## 2019-02-22 PROCEDURE — G0378 HOSPITAL OBSERVATION PER HR: HCPCS

## 2019-02-22 PROCEDURE — 2580000003 HC RX 258: Performed by: INTERNAL MEDICINE

## 2019-02-22 PROCEDURE — 6370000000 HC RX 637 (ALT 250 FOR IP): Performed by: STUDENT IN AN ORGANIZED HEALTH CARE EDUCATION/TRAINING PROGRAM

## 2019-02-22 PROCEDURE — 3430000000 HC RX DIAGNOSTIC RADIOPHARMACEUTICAL: Performed by: INTERNAL MEDICINE

## 2019-02-22 PROCEDURE — 78452 HT MUSCLE IMAGE SPECT MULT: CPT

## 2019-02-22 PROCEDURE — 93005 ELECTROCARDIOGRAM TRACING: CPT

## 2019-02-22 PROCEDURE — A9500 TC99M SESTAMIBI: HCPCS | Performed by: INTERNAL MEDICINE

## 2019-02-22 PROCEDURE — 2580000003 HC RX 258: Performed by: STUDENT IN AN ORGANIZED HEALTH CARE EDUCATION/TRAINING PROGRAM

## 2019-02-22 RX ORDER — SODIUM CHLORIDE 0.9 % (FLUSH) 0.9 %
10 SYRINGE (ML) INJECTION PRN
Status: DISCONTINUED | OUTPATIENT
Start: 2019-02-22 | End: 2019-02-22 | Stop reason: HOSPADM

## 2019-02-22 RX ORDER — NITROGLYCERIN 0.4 MG/1
0.4 TABLET SUBLINGUAL EVERY 5 MIN PRN
Status: DISCONTINUED | OUTPATIENT
Start: 2019-02-22 | End: 2019-02-22

## 2019-02-22 RX ORDER — AMLODIPINE BESYLATE 10 MG/1
10 TABLET ORAL DAILY
Qty: 30 TABLET | Refills: 0 | OUTPATIENT
Start: 2019-02-23

## 2019-02-22 RX ORDER — SODIUM CHLORIDE 0.9 % (FLUSH) 0.9 %
10 SYRINGE (ML) INJECTION PRN
Status: DISCONTINUED | OUTPATIENT
Start: 2019-02-22 | End: 2019-02-22

## 2019-02-22 RX ORDER — METOPROLOL TARTRATE 5 MG/5ML
2.5 INJECTION INTRAVENOUS PRN
Status: DISCONTINUED | OUTPATIENT
Start: 2019-02-22 | End: 2019-02-22

## 2019-02-22 RX ORDER — SODIUM CHLORIDE 9 MG/ML
INJECTION, SOLUTION INTRAVENOUS ONCE
Status: COMPLETED | OUTPATIENT
Start: 2019-02-22 | End: 2019-02-22

## 2019-02-22 RX ADMIN — VITAMIN D, TAB 1000IU (100/BT) 2000 UNITS: 25 TAB at 13:11

## 2019-02-22 RX ADMIN — SODIUM CHLORIDE, PRESERVATIVE FREE 10 ML: 5 INJECTION INTRAVENOUS at 10:00

## 2019-02-22 RX ADMIN — AMLODIPINE BESYLATE 10 MG: 10 TABLET ORAL at 13:12

## 2019-02-22 RX ADMIN — TETRAKIS(2-METHOXYISOBUTYLISOCYANIDE)COPPER(I) TETRAFLUOROBORATE 35.6 MILLICURIE: 1 INJECTION, POWDER, LYOPHILIZED, FOR SOLUTION INTRAVENOUS at 11:58

## 2019-02-22 RX ADMIN — Medication 10 ML: at 11:39

## 2019-02-22 RX ADMIN — ASPIRIN 81 MG: 81 TABLET ORAL at 13:12

## 2019-02-22 RX ADMIN — Medication 10 ML: at 13:15

## 2019-02-22 RX ADMIN — SODIUM CHLORIDE, PRESERVATIVE FREE 10 ML: 5 INJECTION INTRAVENOUS at 11:58

## 2019-02-22 RX ADMIN — SODIUM CHLORIDE: 9 INJECTION, SOLUTION INTRAVENOUS at 11:39

## 2019-02-22 RX ADMIN — TETRAKIS(2-METHOXYISOBUTYLISOCYANIDE)COPPER(I) TETRAFLUOROBORATE 13.6 MILLICURIE: 1 INJECTION, POWDER, LYOPHILIZED, FOR SOLUTION INTRAVENOUS at 10:00

## 2019-02-25 ENCOUNTER — NURSE ONLY (OUTPATIENT)
Dept: PRIMARY CARE CLINIC | Age: 54
End: 2019-02-25
Payer: MEDICARE

## 2019-02-25 VITALS — BODY MASS INDEX: 28.99 KG/M2 | HEIGHT: 65 IN | WEIGHT: 174 LBS

## 2019-02-25 DIAGNOSIS — Z11.1 PPD SCREENING TEST: Primary | ICD-10-CM

## 2019-02-25 PROCEDURE — 86580 TB INTRADERMAL TEST: CPT | Performed by: INTERNAL MEDICINE

## 2019-02-25 ASSESSMENT — PATIENT HEALTH QUESTIONNAIRE - PHQ9
SUM OF ALL RESPONSES TO PHQ QUESTIONS 1-9: 0
SUM OF ALL RESPONSES TO PHQ9 QUESTIONS 1 & 2: 0
2. FEELING DOWN, DEPRESSED OR HOPELESS: 0
SUM OF ALL RESPONSES TO PHQ QUESTIONS 1-9: 0
1. LITTLE INTEREST OR PLEASURE IN DOING THINGS: 0

## 2019-02-27 ENCOUNTER — NURSE ONLY (OUTPATIENT)
Dept: PRIMARY CARE CLINIC | Age: 54
End: 2019-02-27

## 2019-02-27 DIAGNOSIS — Z11.1 PPD SCREENING TEST: Primary | ICD-10-CM

## 2019-03-18 ENCOUNTER — OFFICE VISIT (OUTPATIENT)
Dept: PRIMARY CARE CLINIC | Age: 54
End: 2019-03-18
Payer: MEDICARE

## 2019-03-18 VITALS
DIASTOLIC BLOOD PRESSURE: 98 MMHG | HEART RATE: 89 BPM | OXYGEN SATURATION: 98 % | HEIGHT: 66 IN | TEMPERATURE: 99.1 F | BODY MASS INDEX: 29.41 KG/M2 | WEIGHT: 183 LBS | SYSTOLIC BLOOD PRESSURE: 146 MMHG

## 2019-03-18 DIAGNOSIS — R07.89 OTHER CHEST PAIN: Primary | ICD-10-CM

## 2019-03-18 PROCEDURE — G8427 DOCREV CUR MEDS BY ELIG CLIN: HCPCS | Performed by: INTERNAL MEDICINE

## 2019-03-18 PROCEDURE — 99214 OFFICE O/P EST MOD 30 MIN: CPT | Performed by: INTERNAL MEDICINE

## 2019-03-18 PROCEDURE — 1036F TOBACCO NON-USER: CPT | Performed by: INTERNAL MEDICINE

## 2019-03-18 PROCEDURE — G8482 FLU IMMUNIZE ORDER/ADMIN: HCPCS | Performed by: INTERNAL MEDICINE

## 2019-03-18 PROCEDURE — 3017F COLORECTAL CA SCREEN DOC REV: CPT | Performed by: INTERNAL MEDICINE

## 2019-03-18 PROCEDURE — G8419 CALC BMI OUT NRM PARAM NOF/U: HCPCS | Performed by: INTERNAL MEDICINE

## 2019-03-18 PROCEDURE — G8598 ASA/ANTIPLAT THER USED: HCPCS | Performed by: INTERNAL MEDICINE

## 2019-03-18 ASSESSMENT — ENCOUNTER SYMPTOMS: SHORTNESS OF BREATH: 1

## 2019-04-02 RX ORDER — PROMETHAZINE HYDROCHLORIDE 12.5 MG/1
TABLET ORAL
Qty: 30 TABLET | Refills: 0 | Status: SHIPPED | OUTPATIENT
Start: 2019-04-02 | End: 2019-05-17 | Stop reason: SDUPTHER

## 2019-04-02 NOTE — TELEPHONE ENCOUNTER
Health Maintenance   Topic Date Due    Pneumococcal 0-64 years at Risk Vaccine (1 of 1 - PPSV23) 07/03/1972    HIV screen  07/03/1981    Cervical cancer screen  07/03/1987    Breast cancer screen  07/03/2016    Shingles Vaccine (1 of 2) 07/03/2016    Potassium monitoring  02/21/2020    Creatinine monitoring  02/21/2020    Colon cancer screen colonoscopy  03/20/2023    DTaP/Tdap/Td vaccine (3 - Td) 04/12/2023    Lipid screen  08/23/2023    Flu vaccine  Completed             (applicable per patient's age: Cancer Screenings, Depression Screening, Fall Risk Screening, Immunizations)    Hemoglobin A1C (%)   Date Value   08/22/2018 4.8     LDL Cholesterol (mg/dL)   Date Value   08/23/2018 127     AST (U/L)   Date Value   08/23/2018 15     ALT (U/L)   Date Value   08/23/2018 13     BUN (mg/dL)   Date Value   02/21/2019 11      (goal A1C is < 7)   (goal LDL is <100) need 30-50% reduction from baseline     BP Readings from Last 3 Encounters:   03/18/19 (!) 146/98   02/22/19 119/80   02/15/19 139/89    (goal /80)      All Future Testing planned in [facility]:  Lab Frequency Next Occurrence   KARON Digital Screen Bilateral [QMO4098] Once 07/31/2019   KARON Digital Screen Bilateral [[id number]] Once 05/01/2019   POCT FECAL IMMUNOCHEMICAL TEST (FIT) Once 10/22/2019   CTA NECK W CONTRAST Once 03/22/2019       Next Visit Date:  Future Appointments   Date Time Provider Jose Rodriguez   6/18/2019  9:40 AM Bam Chapa PA-C [facility]            Patient Active Problem List:     Calculus (=stone)     Essential hypertension     Asthma     Syncope and collapse     Ischemic stroke ([facility] [address]     Right sided weakness     Meralgia paresthetica of right side     Acute right hemiparesis (HCC)     Paresthesias     Numbness     Hypercholesteremia     Chest pain

## 2019-04-16 ENCOUNTER — OFFICE VISIT (OUTPATIENT)
Dept: PRIMARY CARE CLINIC | Age: 54
End: 2019-04-16
Payer: MEDICARE

## 2019-04-16 VITALS
HEART RATE: 80 BPM | OXYGEN SATURATION: 100 % | DIASTOLIC BLOOD PRESSURE: 85 MMHG | BODY MASS INDEX: 29.38 KG/M2 | WEIGHT: 182 LBS | SYSTOLIC BLOOD PRESSURE: 132 MMHG

## 2019-04-16 DIAGNOSIS — M54.9 CHRONIC BILATERAL BACK PAIN, UNSPECIFIED BACK LOCATION: Primary | ICD-10-CM

## 2019-04-16 DIAGNOSIS — G89.29 CHRONIC BILATERAL BACK PAIN, UNSPECIFIED BACK LOCATION: Primary | ICD-10-CM

## 2019-04-16 PROCEDURE — G8598 ASA/ANTIPLAT THER USED: HCPCS | Performed by: INTERNAL MEDICINE

## 2019-04-16 PROCEDURE — G8419 CALC BMI OUT NRM PARAM NOF/U: HCPCS | Performed by: INTERNAL MEDICINE

## 2019-04-16 PROCEDURE — 1036F TOBACCO NON-USER: CPT | Performed by: INTERNAL MEDICINE

## 2019-04-16 PROCEDURE — 3017F COLORECTAL CA SCREEN DOC REV: CPT | Performed by: INTERNAL MEDICINE

## 2019-04-16 PROCEDURE — G8427 DOCREV CUR MEDS BY ELIG CLIN: HCPCS | Performed by: INTERNAL MEDICINE

## 2019-04-16 PROCEDURE — 99214 OFFICE O/P EST MOD 30 MIN: CPT | Performed by: INTERNAL MEDICINE

## 2019-04-16 ASSESSMENT — ENCOUNTER SYMPTOMS: BACK PAIN: 1

## 2019-04-16 NOTE — PROGRESS NOTES
[address] [facility]  [facility] [address]  Dept: [phone]  Dept Fax: [phone]    Dayo Mckenna is a 47 y.o. female who presents today for   Chief Complaint   Patient presents with    Letter for School/Work     pt state she need a letter to go back to work    and follow upof chronic medical problems:   Patient Active Problem List   Diagnosis    Calculus (=stone)    Essential hypertension    Asthma    Syncope and collapse    Ischemic stroke ([facility] [address]    Right sided weakness    Meralgia paresthetica of right side    Acute right hemiparesis (HCC)    Paresthesias    Numbness    Hypercholesteremia    Chest pain   . Past Medical History:   Diagnosis Date    Asthma     CVA (cerebral vascular accident) ([facility] [address]     Hypertension     Kidney stone        Past Surgical History:   Procedure Laterality Date    CHOLECYSTECTOMY      LITHOTRIPSY         No family history on file. Social History     Tobacco Use    Smoking status: Never Smoker    Smokeless tobacco: Never Used   Substance Use Topics    Alcohol use: No      Current Outpatient Medications   Medication Sig Dispense Refill    amLODIPine (NORVASC) 10 MG tablet Take 1 tablet by mouth daily 30 tablet 2    promethazine (PHENERGAN) 12.5 MG tablet take 1 tablet by mouth every 8 hours if needed 30 tablet 0    RA ASPIRIN EC 81 MG EC tablet take 1 tablet by mouth once daily 30 tablet 3    acetaminophen (TYLENOL) 500 MG tablet Take 2 tablets by mouth every 6 hours as needed for Pain 60 tablet 0    clopidogrel (PLAVIX) 75 MG tablet take 1 tablet by mouth once daily 30 tablet 3    Cholecalciferol (VITAMIN D3) 2000 units CAPS Take 1 capsule by mouth daily 30 capsule 5    atorvastatin (LIPITOR) 40 MG tablet Take 1 tablet by mouth nightly 30 tablet 3     No current facility-administered medications for this visit.       Allergies   Allergen Reactions    Sulfa Antibiotics Itching Health Maintenance   Topic Date Due    HIV screen  07/03/1981    Cervical cancer screen  07/03/1987    Breast cancer screen  07/03/2016    Shingles Vaccine (1 of 2) 07/03/2016    Pneumococcal 0-64 years Vaccine (1 of 1 - PPSV23) 07/11/2019 (Originally 7/3/1972)    Potassium monitoring  02/21/2020    Creatinine monitoring  02/21/2020    Colon cancer screen colonoscopy  03/20/2023    DTaP/Tdap/Td vaccine (3 - Td) 04/12/2023    Lipid screen  08/23/2023    Flu vaccine  Completed       Controlled Substances Monitoring:      HPI:     Back Pain   This is a chronic (has been off work for a month because of back pain now wants to go back to work) problem. The current episode started more than 1 month ago. The pain is present in the thoracic spine and lumbar spine. The quality of the pain is described as stabbing and aching (upper is sharp lower back is achy). The pain does not radiate. The symptoms are aggravated by bending (lifting). She has tried muscle relaxant (tylenol helps) for the symptoms. The treatment provided mild (muscle relaxant does not help) relief. Scheduled for CTA of neck tomorrow. ROS:     Review of Systems   Musculoskeletal: Positive for back pain. All other systems reviewed and are negative. Objective:     Physical Exam   Constitutional: She is oriented to person, place, and time. She appears well-developed and well-nourished. HENT:   Head: Normocephalic and atraumatic. Neck: Neck supple. Abdominal: Soft. Musculoskeletal: She exhibits no edema. Lumbar back: She exhibits normal range of motion and no tenderness. Back:    Neurological: She is alert and oriented to person, place, and time. Reflex Scores:       Patellar reflexes are 1+ on the right side and 1+ on the left side. Achilles reflexes are 1+ on the right side and 1+ on the left side. Skin: Skin is warm and dry. Psychiatric: She has a normal mood and affect.  Her behavior is normal. Vitals reviewed. Will give note to return to work on Friday.

## 2019-04-19 ENCOUNTER — TELEPHONE (OUTPATIENT)
Dept: PRIMARY CARE CLINIC | Age: 54
End: 2019-04-19

## 2019-05-16 ENCOUNTER — OFFICE VISIT (OUTPATIENT)
Dept: PRIMARY CARE CLINIC | Age: 54
End: 2019-05-16
Payer: MEDICARE

## 2019-05-16 VITALS
OXYGEN SATURATION: 98 % | WEIGHT: 182 LBS | DIASTOLIC BLOOD PRESSURE: 99 MMHG | HEART RATE: 88 BPM | SYSTOLIC BLOOD PRESSURE: 143 MMHG | BODY MASS INDEX: 29.38 KG/M2 | TEMPERATURE: 97.2 F

## 2019-05-16 DIAGNOSIS — I63.9 ISCHEMIC STROKE (HCC): Primary | ICD-10-CM

## 2019-05-16 DIAGNOSIS — S16.1XXA ACUTE STRAIN OF NECK MUSCLE, INITIAL ENCOUNTER: ICD-10-CM

## 2019-05-16 DIAGNOSIS — I10 ESSENTIAL HYPERTENSION: ICD-10-CM

## 2019-05-16 DIAGNOSIS — R21 RASH OF FACE: ICD-10-CM

## 2019-05-16 PROCEDURE — G8598 ASA/ANTIPLAT THER USED: HCPCS | Performed by: NURSE PRACTITIONER

## 2019-05-16 PROCEDURE — G8419 CALC BMI OUT NRM PARAM NOF/U: HCPCS | Performed by: NURSE PRACTITIONER

## 2019-05-16 PROCEDURE — G8427 DOCREV CUR MEDS BY ELIG CLIN: HCPCS | Performed by: NURSE PRACTITIONER

## 2019-05-16 PROCEDURE — 99214 OFFICE O/P EST MOD 30 MIN: CPT | Performed by: NURSE PRACTITIONER

## 2019-05-16 PROCEDURE — 1036F TOBACCO NON-USER: CPT | Performed by: NURSE PRACTITIONER

## 2019-05-16 PROCEDURE — 3017F COLORECTAL CA SCREEN DOC REV: CPT | Performed by: NURSE PRACTITIONER

## 2019-05-16 RX ORDER — TRIAMCINOLONE ACETONIDE 0.25 MG/G
OINTMENT TOPICAL
Qty: 15 G | Refills: 0 | Status: SHIPPED | OUTPATIENT
Start: 2019-05-16 | End: 2019-10-10 | Stop reason: SDUPTHER

## 2019-05-16 RX ORDER — TRIAMCINOLONE ACETONIDE 40 MG/ML
40 INJECTION, SUSPENSION INTRA-ARTICULAR; INTRAMUSCULAR ONCE
Status: COMPLETED | OUTPATIENT
Start: 2019-05-16 | End: 2019-05-16

## 2019-05-16 RX ORDER — HYDROCHLOROTHIAZIDE 12.5 MG/1
12.5 CAPSULE, GELATIN COATED ORAL EVERY MORNING
Qty: 30 CAPSULE | Refills: 1 | Status: SHIPPED | OUTPATIENT
Start: 2019-05-16 | End: 2019-07-12 | Stop reason: SDUPTHER

## 2019-05-16 RX ADMIN — TRIAMCINOLONE ACETONIDE 40 MG: 40 INJECTION, SUSPENSION INTRA-ARTICULAR; INTRAMUSCULAR at 10:12

## 2019-05-16 ASSESSMENT — ENCOUNTER SYMPTOMS: RESPIRATORY NEGATIVE: 1

## 2019-05-16 NOTE — PATIENT INSTRUCTIONS
[address] Alanis MD   [address] [place] [address], [facility]  Patient Education        Neck Strain or Sprain: Rehab Exercises  Your Care Instructions  Here are some examples of typical rehabilitation exercises for your condition. Start each exercise slowly. Ease off the exercise if you start to have pain. Your doctor or physical therapist will tell you when you can start these exercises and which ones will work best for you. How to do the exercises  Neck rotation    1. Sit in a firm chair, or stand up straight. 2. Keeping your chin level, turn your head to the right, and hold for 15 to 30 seconds. 3. Turn your head to the left and hold for 15 to 30 seconds. 4. Repeat 2 to 4 times to each side. Neck stretches    1. Look straight ahead, and tip your right ear to your right shoulder. Do not let your left shoulder rise up as you tip your head to the right. 2. Hold for 15 to 30 seconds. 3. Tilt your head to the left. Do not let your right shoulder rise up as you tip your head to the left. 4. Hold for 15 to 30 seconds. 5. Repeat 2 to 4 times to each side. Forward neck flexion    1. Sit in a firm chair, or stand up straight. 2. Bend your head forward. 3. Hold for 15 to 30 seconds. 4. Repeat 2 to 4 times. Lateral (side) bend strengthening    1. With your right hand, place your first two fingers on your right temple. 2. Start to bend your head to the side while using gentle pressure from your fingers to keep your head from bending. 3. Hold for about 6 seconds. 4. Repeat 8 to 12 times. 5. Switch hands and repeat the same exercise on your left side. Forward bend strengthening    1. Place your first two fingers of either hand on your forehead. 2. Start to bend your head forward while using gentle pressure from your fingers to keep your head from bending. 3. Hold for about 6 seconds. 4. Repeat 8 to 12 times.     Neutral position strengthening    1. Using one hand, place your fingertips on the back of your head at the top of your neck. 2. Start to bend your head backward while using gentle pressure from your fingers to keep your head from bending. 3. Hold for about 6 seconds. 4. Repeat 8 to 12 times. Chin tuck    1. Lie on the floor with a rolled-up towel under your neck. Your head should be touching the floor. 2. Slowly bring your chin toward your chest.  3. Hold for a count of 6, and then relax for up to 10 seconds. 4. Repeat 8 to 12 times. Follow-up care is a key part of your treatment and safety. Be sure to make and go to all appointments, and call your doctor if you are having problems. It's also a good idea to know your test results and keep a list of the medicines you take. Where can you learn more? Go to [email or web address]. org and sign in to your [employer] account. Enter M679 in the [employer] box to learn more about \"Neck Strain or Sprain: Rehab Exercises. \"     If you do not have an account, please click on the \"Sign Up Now\" link. Current as of: September 20, 2018  Content Version: 12.0  © 2923-9081 Healthwise, Incorporated. Care instructions adapted under license by [facility] ([facility]). If you have questions about a medical condition or this instruction, always ask your healthcare professional. [place] [address] any warranty or liability for your use of this information.

## 2019-05-16 NOTE — PROGRESS NOTES
Smoking status: Never Smoker    Smokeless tobacco: Never Used   Substance Use Topics    Alcohol use: No        Vitals:    05/16/19 0856 05/16/19 0926   BP: (!) 144/101 (!) 143/99   Site: Left Upper Arm Left Upper Arm   Position: Sitting Sitting   Cuff Size: Medium Adult Large Adult   Pulse: 88    Temp: 97.2 °F (36.2 °C)    TempSrc: Oral    SpO2: 98%    Weight: 182 lb (82.6 kg)      Estimated body mass index is 29.38 kg/m² as calculated from the following:    Height as of 3/18/19: 5' 6\" (1.676 m). Weight as of this encounter: 182 lb (82.6 kg). DIAGNOSTIC FINDINGS:  CBC:  Lab Results   Component Value Date    WBC 3.5 02/21/2019    HGB 12.7 02/21/2019    PLT See Reflexed IPF Result 02/21/2019       BMP:    Lab Results   Component Value Date     02/21/2019    K 4.0 02/21/2019     02/21/2019    CO2 25 02/21/2019    BUN 11 02/21/2019    CREATININE 1.10 02/21/2019    GLUCOSE 81 02/21/2019       HEMOGLOBIN A1C:   Lab Results   Component Value Date    LABA1C 4.8 08/22/2018       FASTING LIPID PANEL:  Lab Results   Component Value Date    CHOL 208 (H) 08/23/2018    HDL 62 08/23/2018    TRIG 97 08/23/2018       Physical Exam   Constitutional: She appears well-developed and well-nourished. No distress. HENT:   Head: Normocephalic. Right Ear: External ear normal.   Left Ear: External ear normal.   Eyes: Pupils are equal, round, and reactive to light. No scleral icterus. Neck: Neck supple. No tracheal deviation present. Cardiovascular: Normal rate and regular rhythm. Pulmonary/Chest: Effort normal. No respiratory distress. Musculoskeletal: She exhibits tenderness. She exhibits no deformity. Left shoulder: She exhibits normal range of motion and no tenderness. Lymphadenopathy:     She has no cervical adenopathy. Skin: Skin is warm. ASSESSMENT     Diagnosis Orders   1. Ischemic stroke (HCC)  Lipid, Fasting    Comprehensive Metabolic Panel, Fasting   2.  Acute strain of neck vitamins, or herbal medicines? no   Are you having any side effects from any of your medications? -  no  Have you stopped taking any of your medications? Is so, why? -  no    Have you seen any other physician or provider since your last visit? No  Have you had any other diagnostic tests since your last visit? No  Have you been seen in the emergency room and/or had an admission to a hospital since we last saw you? No  Have you had your routine dental cleaning in the past 6 months? yes     Have you activated your [employer] account? If not, what are your barriers?  Yes     Patient Care Team:  Jm Chandler MD as PCP - General (Internal Medicine)    Medical History Review  Past Medical, Family, and Social History reviewed and does not contribute to the patient presenting condition    Health Maintenance   Topic Date Due    HIV screen  07/03/1981    Cervical cancer screen  07/03/1987    Breast cancer screen  07/03/2016    Shingles Vaccine (1 of 2) 07/03/2016    Pneumococcal 0-64 years Vaccine (1 of 1 - PPSV23) 07/11/2019 (Originally 7/3/1972)    Potassium monitoring  02/21/2020    Creatinine monitoring  02/21/2020    Colon cancer screen colonoscopy  03/20/2023    DTaP/Tdap/Td vaccine (3 - Td) 04/12/2023    Lipid screen  08/23/2023    Flu vaccine  Completed

## 2019-05-18 RX ORDER — PROMETHAZINE HYDROCHLORIDE 12.5 MG/1
TABLET ORAL
Qty: 30 TABLET | Refills: 0 | Status: SHIPPED | OUTPATIENT
Start: 2019-05-18 | End: 2019-07-08 | Stop reason: SDUPTHER

## 2019-05-31 RX ORDER — CLOPIDOGREL BISULFATE 75 MG/1
TABLET ORAL
Qty: 30 TABLET | Refills: 3 | Status: SHIPPED | OUTPATIENT
Start: 2019-05-31 | End: 2019-10-10 | Stop reason: SDUPTHER

## 2019-05-31 NOTE — TELEPHONE ENCOUNTER
Health Maintenance   Topic Date Due    HIV screen  07/03/1981    Cervical cancer screen  07/03/1987    Breast cancer screen  07/03/2016    Shingles Vaccine (1 of 2) 07/03/2016    Pneumococcal 0-64 years Vaccine (1 of 1 - PPSV23) 07/11/2019 (Originally 7/3/1972)    Potassium monitoring  02/21/2020    Creatinine monitoring  02/21/2020    Colon cancer screen colonoscopy  03/20/2023    DTaP/Tdap/Td vaccine (3 - Td) 04/12/2023    Lipid screen  08/23/2023    Flu vaccine  Completed             (applicable per patient's age: Cancer Screenings, Depression Screening, Fall Risk Screening, Immunizations)    Hemoglobin A1C (%)   Date Value   08/22/2018 4.8     LDL Cholesterol (mg/dL)   Date Value   08/23/2018 127     AST (U/L)   Date Value   08/23/2018 15     ALT (U/L)   Date Value   08/23/2018 13     BUN (mg/dL)   Date Value   02/21/2019 11      (goal A1C is < 7)   (goal LDL is <100) need 30-50% reduction from baseline     BP Readings from Last 3 Encounters:   05/16/19 (!) 143/99   04/16/19 132/85   03/18/19 (!) 146/98    (goal /80)      All Future Testing planned in [facility]:  Lab Frequency Next Occurrence   KARON Digital Screen Bilateral [ICL1521] Once 07/31/2019   KARON Digital Screen Bilateral [[id number]] Once 10/22/2019   POCT FECAL IMMUNOCHEMICAL TEST (FIT) Once 10/22/2019   CTA NECK W CONTRAST Once 10/09/2019   Lipid, Fasting Once 09/01/2019   Comprehensive Metabolic Panel, Fasting Once 09/01/2019       Next Visit Date:  Future Appointments   Date Time Provider Jose Rodriguez   6/18/2019  9:40 AM Ann Solorzano PA-C [address]            Patient Active Problem List:     Calculus (=stone)     Essential hypertension     Asthma     Syncope and collapse     Ischemic stroke ([facility] [address]     Right sided weakness     Meralgia paresthetica of right side     Acute right hemiparesis (HCC)     Paresthesias     Numbness     Hypercholesteremia     Chest pain

## 2019-06-12 RX ORDER — ASPIRIN 81 MG/1
TABLET, DELAYED RELEASE ORAL
Qty: 30 TABLET | Refills: 3 | Status: SHIPPED | OUTPATIENT
Start: 2019-06-12 | End: 2019-10-13 | Stop reason: SDUPTHER

## 2019-06-12 NOTE — TELEPHONE ENCOUNTER
Health Maintenance   Topic Date Due    HIV screen  07/03/1981    Cervical cancer screen  07/03/1987    Breast cancer screen  07/03/2016    Shingles Vaccine (1 of 2) 07/03/2016    Pneumococcal 0-64 years Vaccine (1 of 1 - PPSV23) 07/11/2019 (Originally 7/3/1972)    Potassium monitoring  02/21/2020    Creatinine monitoring  02/21/2020    Colon cancer screen colonoscopy  03/20/2023    DTaP/Tdap/Td vaccine (3 - Td) 04/12/2023    Lipid screen  08/23/2023    Flu vaccine  Completed             (applicable per patient's age: Cancer Screenings, Depression Screening, Fall Risk Screening, Immunizations)    Hemoglobin A1C (%)   Date Value   08/22/2018 4.8     LDL Cholesterol (mg/dL)   Date Value   08/23/2018 127     AST (U/L)   Date Value   08/23/2018 15     ALT (U/L)   Date Value   08/23/2018 13     BUN (mg/dL)   Date Value   02/21/2019 11      (goal A1C is < 7)   (goal LDL is <100) need 30-50% reduction from baseline     BP Readings from Last 3 Encounters:   05/16/19 (!) 143/99   04/16/19 132/85   03/18/19 (!) 146/98    (goal /80)      All Future Testing planned in [facility]:  Lab Frequency Next Occurrence   KARON Digital Screen Bilateral [[id number]] Once 07/31/2019   KARON Digital Screen Bilateral [[id number]] Once 10/22/2019   POCT FECAL IMMUNOCHEMICAL TEST (FIT) Once 10/22/2019   CTA NECK W CONTRAST Once 10/09/2019   Lipid, Fasting Once 09/01/2019   Comprehensive Metabolic Panel, Fasting Once 09/01/2019       Next Visit Date:  Future Appointments   Date Time Provider Jose Rodriguez   6/18/2019  9:40 AM Luigi Gonzales PA-C [address]            Patient Active Problem List:     Calculus (=stone)     Essential hypertension     Asthma     Syncope and collapse     Ischemic stroke ([facility] [address]     Right sided weakness     Meralgia paresthetica of right side     Acute right hemiparesis (HCC)     Paresthesias     Numbness     Hypercholesteremia     Chest pain

## 2019-06-27 ENCOUNTER — NURSE TRIAGE (OUTPATIENT)
Dept: OTHER | Facility: CLINIC | Age: 54
End: 2019-06-27

## 2019-06-27 NOTE — TELEPHONE ENCOUNTER
Reason for Disposition   General information question, no triage required and triager able to answer question    Protocols used: INFORMATION ONLY CALL-ADULT-    Calling to find out how to log into [employer] and work day. She is former employee. Number given to then transferred to [employer].

## 2019-07-12 DIAGNOSIS — I10 ESSENTIAL HYPERTENSION: ICD-10-CM

## 2019-07-12 RX ORDER — HYDROCHLOROTHIAZIDE 12.5 MG/1
12.5 CAPSULE, GELATIN COATED ORAL EVERY MORNING
Qty: 30 CAPSULE | Refills: 1 | Status: SHIPPED | OUTPATIENT
Start: 2019-07-12 | End: 2019-09-06 | Stop reason: SDUPTHER

## 2019-07-12 RX ORDER — AMLODIPINE BESYLATE 10 MG/1
TABLET ORAL
Qty: 30 TABLET | Refills: 2 | Status: SHIPPED | OUTPATIENT
Start: 2019-07-12 | End: 2019-10-10 | Stop reason: SDUPTHER

## 2019-07-12 NOTE — TELEPHONE ENCOUNTER
Health Maintenance   Topic Date Due    Pneumococcal 0-64 years Vaccine (1 of 1 - PPSV23) 07/03/1972    HIV screen  07/03/1981    Breast cancer screen  07/03/2016    Shingles Vaccine (1 of 2) 07/03/2016    Flu vaccine (1) 09/01/2019    Potassium monitoring  02/21/2020    Creatinine monitoring  02/21/2020    Cervical cancer screen  07/05/2020    Colon cancer screen colonoscopy  03/20/2023    DTaP/Tdap/Td vaccine (3 - Td) 04/12/2023    Lipid screen  08/23/2023             (applicable per patient's age: Cancer Screenings, Depression Screening, Fall Risk Screening, Immunizations)    Hemoglobin A1C (%)   Date Value   08/22/2018 4.8     LDL Cholesterol (mg/dL)   Date Value   08/23/2018 127     AST (U/L)   Date Value   08/23/2018 15     ALT (U/L)   Date Value   08/23/2018 13     BUN (mg/dL)   Date Value   02/21/2019 11      (goal A1C is < 7)   (goal LDL is <100) need 30-50% reduction from baseline     BP Readings from Last 3 Encounters:   05/16/19 (!) 143/99   04/16/19 132/85   03/18/19 (!) 146/98    (goal /80)      All Future Testing planned in [facility]:  Lab Frequency Next Occurrence   KARON Digital Screen Bilateral [[id number]] Once 07/31/2019   KARON Digital Screen Bilateral [[id number]] Once 10/22/2019   POCT FECAL IMMUNOCHEMICAL TEST (FIT) Once 10/22/2019   CTA NECK W CONTRAST Once 10/09/2019   Lipid, Fasting Once 09/01/2019   Comprehensive Metabolic Panel, Fasting Once 09/01/2019       Next Visit Date:  No future appointments.          Patient Active Problem List:     Calculus (=stone)     Essential hypertension     Asthma     Syncope and collapse     Ischemic stroke (HCC)     Right sided weakness     Meralgia paresthetica of right side     Acute right hemiparesis (HCC)     Paresthesias     Numbness     Hypercholesteremia     Chest pain

## 2019-07-12 NOTE — TELEPHONE ENCOUNTER
Health Maintenance   Topic Date Due    Pneumococcal 0-64 years Vaccine (1 of 1 - PPSV23) 07/03/1972    HIV screen  07/03/1981    Breast cancer screen  07/03/2016    Shingles Vaccine (1 of 2) 07/03/2016    Flu vaccine (1) 09/01/2019    Potassium monitoring  02/21/2020    Creatinine monitoring  02/21/2020    Cervical cancer screen  07/05/2020    Colon cancer screen colonoscopy  03/20/2023    DTaP/Tdap/Td vaccine (3 - Td) 04/12/2023    Lipid screen  08/23/2023             (applicable per patient's age: Cancer Screenings, Depression Screening, Fall Risk Screening, Immunizations)    Hemoglobin A1C (%)   Date Value   08/22/2018 4.8     LDL Cholesterol (mg/dL)   Date Value   08/23/2018 127     AST (U/L)   Date Value   08/23/2018 15     ALT (U/L)   Date Value   08/23/2018 13     BUN (mg/dL)   Date Value   02/21/2019 11      (goal A1C is < 7)   (goal LDL is <100) need 30-50% reduction from baseline     BP Readings from Last 3 Encounters:   05/16/19 (!) 143/99   04/16/19 132/85   03/18/19 (!) 146/98    (goal /80)      All Future Testing planned in [facility]:  Lab Frequency Next Occurrence   KARON Digital Screen Bilateral [[id number]] Once 07/31/2019   KARON Digital Screen Bilateral [[id number]] Once 10/22/2019   POCT FECAL IMMUNOCHEMICAL TEST (FIT) Once 10/22/2019   CTA NECK W CONTRAST Once 10/09/2019   Lipid, Fasting Once 09/01/2019   Comprehensive Metabolic Panel, Fasting Once 09/01/2019       Next Visit Date:  No future appointments.          Patient Active Problem List:     Calculus (=stone)     Essential hypertension     Asthma     Syncope and collapse     Ischemic stroke (HCC)     Right sided weakness     Meralgia paresthetica of right side     Acute right hemiparesis (HCC)     Paresthesias     Numbness     Hypercholesteremia     Chest pain

## 2019-07-16 ENCOUNTER — OFFICE VISIT (OUTPATIENT)
Dept: PRIMARY CARE CLINIC | Age: 54
End: 2019-07-16
Payer: MEDICARE

## 2019-07-16 VITALS
DIASTOLIC BLOOD PRESSURE: 89 MMHG | WEIGHT: 181.6 LBS | BODY MASS INDEX: 29.31 KG/M2 | HEART RATE: 72 BPM | SYSTOLIC BLOOD PRESSURE: 134 MMHG | TEMPERATURE: 98.1 F

## 2019-07-16 DIAGNOSIS — E66.3 OVERWEIGHT (BMI 25.0-29.9): ICD-10-CM

## 2019-07-16 DIAGNOSIS — B35.1 TOENAIL FUNGUS: Primary | ICD-10-CM

## 2019-07-16 PROCEDURE — 3017F COLORECTAL CA SCREEN DOC REV: CPT | Performed by: PHYSICIAN ASSISTANT

## 2019-07-16 PROCEDURE — 1036F TOBACCO NON-USER: CPT | Performed by: PHYSICIAN ASSISTANT

## 2019-07-16 PROCEDURE — 99213 OFFICE O/P EST LOW 20 MIN: CPT | Performed by: PHYSICIAN ASSISTANT

## 2019-07-16 PROCEDURE — G8427 DOCREV CUR MEDS BY ELIG CLIN: HCPCS | Performed by: PHYSICIAN ASSISTANT

## 2019-07-16 PROCEDURE — G8598 ASA/ANTIPLAT THER USED: HCPCS | Performed by: PHYSICIAN ASSISTANT

## 2019-07-16 PROCEDURE — G8419 CALC BMI OUT NRM PARAM NOF/U: HCPCS | Performed by: PHYSICIAN ASSISTANT

## 2019-07-16 ASSESSMENT — ENCOUNTER SYMPTOMS: COLOR CHANGE: 1

## 2019-07-16 NOTE — PROGRESS NOTES
25.0-29. 9)      FOLLOW UP AND INSTRUCTIONS:  Return if symptoms worsen or fail to improve. · Discussed use, benefit, and side effects of prescribed medications. Barriers to medication compliance addressed. All patient questions answered. Pt voiced understanding. · Patient given educational materials - see patient instructions    Electronically signed by Desiree Antonio PA-C on 7/16/19 at 10:29 AM    This note is created with the assistance of a speech-recognition program. While intendingto generate a document that actually reflects the content of the visit, the document can still have some mistakes which may not have been identified and corrected by editing.

## 2019-07-24 ENCOUNTER — TELEPHONE (OUTPATIENT)
Dept: PRIMARY CARE CLINIC | Age: 54
End: 2019-07-24

## 2019-07-26 ENCOUNTER — HOSPITAL ENCOUNTER (OUTPATIENT)
Dept: CT IMAGING | Age: 54
Discharge: HOME | End: 2019-07-28
Payer: MEDICARE

## 2019-07-26 ENCOUNTER — HOSPITAL ENCOUNTER (OUTPATIENT)
Age: 54
Discharge: HOME | End: 2019-07-26
Payer: MEDICARE

## 2019-07-26 DIAGNOSIS — I77.71 INTERNAL CAROTID ARTERY DISSECTION (HCC): Primary | ICD-10-CM

## 2019-07-26 DIAGNOSIS — I63.9 ISCHEMIC STROKE (HCC): ICD-10-CM

## 2019-07-26 LAB
ALBUMIN SERPL-MCNC: 4.4 G/DL (ref 3.5–5.2)
ALBUMIN/GLOBULIN RATIO: 1 (ref 1–2.5)
ALP BLD-CCNC: 152 U/L (ref 35–104)
ALT SERPL-CCNC: 17 U/L (ref 5–33)
ANION GAP SERPL CALCULATED.3IONS-SCNC: 12 MMOL/L (ref 9–17)
AST SERPL-CCNC: 19 U/L
BILIRUB SERPL-MCNC: 0.33 MG/DL (ref 0.3–1.2)
BUN BLDV-MCNC: 7 MG/DL (ref 6–20)
BUN/CREAT BLD: ABNORMAL (ref 9–20)
CALCIUM SERPL-MCNC: 10.4 MG/DL (ref 8.6–10.4)
CHLORIDE BLD-SCNC: 106 MMOL/L (ref 98–107)
CHOLESTEROL, FASTING: 255 MG/DL
CHOLESTEROL/HDL RATIO: 3.2
CO2: 25 MMOL/L (ref 20–31)
CREAT SERPL-MCNC: 1.07 MG/DL (ref 0.5–0.9)
CREAT SERPL-MCNC: 1.07 MG/DL (ref 0.5–0.9)
GFR AFRICAN AMERICAN: >60 ML/MIN
GFR AFRICAN AMERICAN: >60 ML/MIN
GFR NON-AFRICAN AMERICAN: 54 ML/MIN
GFR NON-AFRICAN AMERICAN: 54 ML/MIN
GFR SERPL CREATININE-BSD FRML MDRD: ABNORMAL ML/MIN/{1.73_M2}
GLUCOSE FASTING: 91 MG/DL (ref 70–99)
HDLC SERPL-MCNC: 79 MG/DL
LDL CHOLESTEROL: 155 MG/DL (ref 0–130)
POTASSIUM SERPL-SCNC: 3.5 MMOL/L (ref 3.7–5.3)
SODIUM BLD-SCNC: 143 MMOL/L (ref 135–144)
TOTAL PROTEIN: 8.6 G/DL (ref 6.4–8.3)
TRIGLYCERIDE, FASTING: 107 MG/DL
VLDLC SERPL CALC-MCNC: ABNORMAL MG/DL (ref 1–30)

## 2019-07-26 PROCEDURE — 80053 COMPREHEN METABOLIC PANEL: CPT

## 2019-07-26 PROCEDURE — 80061 LIPID PANEL: CPT

## 2019-07-26 PROCEDURE — 70498 CT ANGIOGRAPHY NECK: CPT

## 2019-07-26 PROCEDURE — 6360000004 HC RX CONTRAST MEDICATION: Performed by: NEUROLOGICAL SURGERY

## 2019-07-26 PROCEDURE — 82565 ASSAY OF CREATININE: CPT

## 2019-07-26 PROCEDURE — 76937 US GUIDE VASCULAR ACCESS: CPT

## 2019-07-26 PROCEDURE — 36415 COLL VENOUS BLD VENIPUNCTURE: CPT

## 2019-07-26 RX ADMIN — IOHEXOL 90 ML: 350 INJECTION, SOLUTION INTRAVENOUS at 09:48

## 2019-07-27 ASSESSMENT — ENCOUNTER SYMPTOMS
CONSTIPATION: 0
SHORTNESS OF BREATH: 0
COUGH: 0
WHEEZING: 0
BACK PAIN: 0
DIARRHEA: 0
NAUSEA: 0
VOMITING: 0

## 2019-07-29 ENCOUNTER — OFFICE VISIT (OUTPATIENT)
Dept: NEUROLOGY | Age: 54
End: 2019-07-29
Payer: MEDICARE

## 2019-07-29 VITALS
HEART RATE: 85 BPM | WEIGHT: 179 LBS | BODY MASS INDEX: 28.89 KG/M2 | SYSTOLIC BLOOD PRESSURE: 121 MMHG | DIASTOLIC BLOOD PRESSURE: 87 MMHG

## 2019-07-29 DIAGNOSIS — R53.1 RIGHT SIDED WEAKNESS: ICD-10-CM

## 2019-07-29 DIAGNOSIS — I77.71 INTERNAL CAROTID ARTERY DISSECTION (HCC): Primary | ICD-10-CM

## 2019-07-29 PROCEDURE — 3017F COLORECTAL CA SCREEN DOC REV: CPT | Performed by: STUDENT IN AN ORGANIZED HEALTH CARE EDUCATION/TRAINING PROGRAM

## 2019-07-29 PROCEDURE — G8598 ASA/ANTIPLAT THER USED: HCPCS | Performed by: STUDENT IN AN ORGANIZED HEALTH CARE EDUCATION/TRAINING PROGRAM

## 2019-07-29 PROCEDURE — G8427 DOCREV CUR MEDS BY ELIG CLIN: HCPCS | Performed by: STUDENT IN AN ORGANIZED HEALTH CARE EDUCATION/TRAINING PROGRAM

## 2019-07-29 PROCEDURE — G8419 CALC BMI OUT NRM PARAM NOF/U: HCPCS | Performed by: STUDENT IN AN ORGANIZED HEALTH CARE EDUCATION/TRAINING PROGRAM

## 2019-07-29 PROCEDURE — 1036F TOBACCO NON-USER: CPT | Performed by: STUDENT IN AN ORGANIZED HEALTH CARE EDUCATION/TRAINING PROGRAM

## 2019-07-29 PROCEDURE — 99214 OFFICE O/P EST MOD 30 MIN: CPT | Performed by: STUDENT IN AN ORGANIZED HEALTH CARE EDUCATION/TRAINING PROGRAM

## 2019-07-29 NOTE — PROGRESS NOTES
02/21/2019    HCT 39.6 02/21/2019    MCV 89.6 02/21/2019    MCH 28.7 02/21/2019    MCHC 32.1 02/21/2019    RDW 11.6 02/21/2019    PLT See Reflexed IPF Result 02/21/2019    MPV NOT REPORTED 02/21/2019     BMP:    Lab Results   Component Value Date     07/26/2019    K 3.5 07/26/2019     07/26/2019    CO2 25 07/26/2019    BUN 7 07/26/2019    LABALBU 4.4 07/26/2019    CREATININE 1.07 07/26/2019    CREATININE 1.07 07/26/2019    CALCIUM 10.4 07/26/2019    GFRAA >60 07/26/2019    GFRAA >60 07/26/2019    LABGLOM 54 07/26/2019    LABGLOM 54 07/26/2019    GLUCOSE 81 02/21/2019     I  IMAGING:  Images were personally reviewed including:  As per HPI      ASSESSMENT AND PLAN  49 y.o. female with hx of asthma and HTN who presented on 2/22 with right-sided hemiparesis and right-sided hemisensory loss. CT head was unremarkable. MRI brain was negative for acute stroke. MRI cervical spine was negative for any cervical myelopathy.  CTA head and neck was concerning for questionable right carotid bifurcation dissection with luminal narrowing for which she was started on aspirin 81 mg and Plavix 75 mg,     She is here for a CTA follow up 7/26 that is unremarkable and no dissection or stenosis. Continue aspirin 81 mg daily and lipitor 40 mg daily. Stop Plavix 75 mg  No further workup per neuro endovascular. Stroke work up completed during last hospitalization. Return to clinic PRN.     Maura Ruffin MD  Pager [phone]  Stroke, [facility]  [address] [place]  Electronically signed 7/29/2019 at 2:16 PM

## 2019-08-01 RX ORDER — ATORVASTATIN CALCIUM 40 MG/1
40 TABLET, FILM COATED ORAL NIGHTLY
Qty: 30 TABLET | Refills: 2 | Status: SHIPPED | OUTPATIENT
Start: 2019-08-01 | End: 2019-12-11 | Stop reason: SDUPTHER

## 2019-08-05 ENCOUNTER — OFFICE VISIT (OUTPATIENT)
Dept: PODIATRY | Age: 54
End: 2019-08-05
Payer: MEDICARE

## 2019-08-05 VITALS — RESPIRATION RATE: 16 BRPM | HEIGHT: 66 IN | WEIGHT: 179 LBS | BODY MASS INDEX: 28.77 KG/M2

## 2019-08-05 DIAGNOSIS — M79.604 PAIN IN BOTH LOWER EXTREMITIES: ICD-10-CM

## 2019-08-05 DIAGNOSIS — G60.9 IDIOPATHIC PERIPHERAL NEUROPATHY: Primary | ICD-10-CM

## 2019-08-05 DIAGNOSIS — M79.605 PAIN IN BOTH LOWER EXTREMITIES: ICD-10-CM

## 2019-08-05 DIAGNOSIS — B35.1 DERMATOPHYTOSIS OF NAIL: ICD-10-CM

## 2019-08-05 PROCEDURE — 1036F TOBACCO NON-USER: CPT | Performed by: PODIATRIST

## 2019-08-05 PROCEDURE — 99203 OFFICE O/P NEW LOW 30 MIN: CPT | Performed by: PODIATRIST

## 2019-08-05 PROCEDURE — G8427 DOCREV CUR MEDS BY ELIG CLIN: HCPCS | Performed by: PODIATRIST

## 2019-08-05 PROCEDURE — 3017F COLORECTAL CA SCREEN DOC REV: CPT | Performed by: PODIATRIST

## 2019-08-05 PROCEDURE — G8598 ASA/ANTIPLAT THER USED: HCPCS | Performed by: PODIATRIST

## 2019-08-05 PROCEDURE — G8419 CALC BMI OUT NRM PARAM NOF/U: HCPCS | Performed by: PODIATRIST

## 2019-08-05 RX ORDER — GABAPENTIN 100 MG/1
100 CAPSULE ORAL 3 TIMES DAILY
Qty: 90 CAPSULE | Refills: 0 | Status: SHIPPED | OUTPATIENT
Start: 2019-08-05 | End: 2020-05-21

## 2019-08-05 NOTE — PROGRESS NOTES
[facility]  [facility]  [id number] [place] [address]  Dept: [phone]  Dept Fax: [phone]    NEW PATIENT PROGRESS NOTE  Date of patient's visit: 8/5/2019  Patient's Name:  Lorenza Bradford YOB: 1965            Patient Care Team:  JESIKA Jarvis CNP as PCP - General (Family Medicine)  JESIKA Jarvis CNP as PCP - [facility] Empaneled Provider  Shira Cardenas DPM as Physician (Podiatry)        Chief Complaint   Patient presents with    New Patient    Nail Problem    Foot Pain         HPI:   Lorenza Bradford is a 49 y.o. female who presents to the office today complaining of  Change in nail and discoloration and tingling to both feet. Symptoms began 2 month(s) ago. Patient relates pain is Absent . Pain is rated 0 out of 10 and is described as none. Treatments prior to today's visit include: was seen by pcp who prescribed otc antifungal cream..  Currently denies F/C/N/V. Pt's primary care physician is JESIKA Jarvis CNP last seen 7/16/19    She has new complaint of increased numbness to christofer feet with burning and tingling. Allergies   Allergen Reactions    Sulfa Antibiotics Itching       Past Medical History:   Diagnosis Date    Asthma     CVA (cerebral vascular accident) ([facility] [address]     Hypertension     Kidney stone        Prior to Admission medications    Medication Sig Start Date End Date Taking? Authorizing Provider   ciclopirox (PENLAC) 8 % solution Apply topically nightly. Remove once weekly with alcohol or nail polish remover. 8/5/19  Yes Shira Cardenas DPM   gabapentin (NEURONTIN) 100 MG capsule Take 1 capsule by mouth 3 times daily for 30 days.  Intended supply: 30 days 8/5/19 9/4/19 Yes Shiar Cardenas DPM   atorvastatin (LIPITOR) 40 MG tablet Take 1 tablet by mouth nightly 8/1/19  Yes Gill Aguillon PA-C   Terbinafine HCl 1 % SOLN Apply topically daily 7/16/19 8/15/19 Yes Gill Aguillon PA-C   amLODIPine (NORVASC) 10 MG tablet take 1 tablet by mouth once daily 7/12/19  Yes Cuenca Bisi, APRN - MARCELA   hydrochlorothiazide (MICROZIDE) 12.5 MG capsule take 1 capsule by mouth every morning 7/12/19  Yes Bang Bodily, APRN - CNP   promethazine (PHENERGAN) 12.5 MG tablet Take 1 tablet by mouth every 8 hours as needed for Nausea 7/8/19  Yes JOSE Cerda ASPIRIN ADULT LOW STRENGTH 81 MG EC tablet take 1 tablet by mouth once daily 6/12/19  Yes JESIKA Koroma - CNP   clopidogrel (PLAVIX) 75 MG tablet take 1 tablet by mouth once daily 5/31/19  Yes JESIKA Koroma CNP   acetaminophen (TYLENOL) 500 MG tablet Take 2 tablets by mouth every 6 hours as needed for Pain 1/28/19  Yes Bambi Carlin MD   Cholecalciferol (VITAMIN D3) 2000 units CAPS Take 1 capsule by mouth daily 8/31/18  Yes Rafat Borrero MD       Past Surgical History:   Procedure Laterality Date    CHOLECYSTECTOMY      LITHOTRIPSY         No family history on file. Social History     Tobacco Use    Smoking status: Never Smoker    Smokeless tobacco: Never Used   Substance Use Topics    Alcohol use: No       Review of Systems    Review of Systems:   History obtained from chart review and the patient  General ROS: negative for - chills, fatigue, fever, night sweats or weight gain  Constitutional: Negative for chills, diaphoresis, fatigue, fever and unexpected weight change. Musculoskeletal: Positive for arthralgias, gait problem and joint swelling. Neurological ROS: negative for - behavioral changes, confusion, headaches or seizures. Negative for weakness and numbness. Dermatological ROS: negative for - mole changes, rash  Cardiovascular: Negative for leg swelling. Gastrointestinal: Negative for constipation, diarrhea, nausea and vomiting. Lower Extremity Physical Examination:   Vitals:   Vitals:    08/05/19 1038   Resp: 16     General: AAO x 3 in NAD. Dermatologic Exam:  Skin lesion/ulceration Absent .

## 2019-09-06 DIAGNOSIS — I10 ESSENTIAL HYPERTENSION: ICD-10-CM

## 2019-09-06 RX ORDER — HYDROCHLOROTHIAZIDE 12.5 MG/1
12.5 CAPSULE, GELATIN COATED ORAL EVERY MORNING
Qty: 30 CAPSULE | Refills: 1 | Status: SHIPPED | OUTPATIENT
Start: 2019-09-06 | End: 2019-11-07 | Stop reason: SDUPTHER

## 2019-10-10 DIAGNOSIS — R21 RASH OF FACE: ICD-10-CM

## 2019-10-10 RX ORDER — AMLODIPINE BESYLATE 10 MG/1
TABLET ORAL
Qty: 30 TABLET | Refills: 2 | Status: SHIPPED | OUTPATIENT
Start: 2019-10-10 | End: 2020-01-27

## 2019-10-10 RX ORDER — CLOPIDOGREL BISULFATE 75 MG/1
TABLET ORAL
Qty: 30 TABLET | Refills: 3 | Status: SHIPPED | OUTPATIENT
Start: 2019-10-10 | End: 2020-01-27

## 2019-10-10 RX ORDER — TRIAMCINOLONE ACETONIDE 0.25 MG/G
OINTMENT TOPICAL
Qty: 15 G | Refills: 0 | Status: SHIPPED | OUTPATIENT
Start: 2019-10-10

## 2019-10-14 RX ORDER — ASPIRIN 81 MG/1
TABLET, DELAYED RELEASE ORAL
Qty: 30 TABLET | Refills: 3 | Status: SHIPPED | OUTPATIENT
Start: 2019-10-14 | End: 2020-02-06

## 2019-11-07 DIAGNOSIS — I10 ESSENTIAL HYPERTENSION: ICD-10-CM

## 2019-11-07 RX ORDER — HYDROCHLOROTHIAZIDE 12.5 MG/1
12.5 CAPSULE, GELATIN COATED ORAL EVERY MORNING
Qty: 30 CAPSULE | Refills: 1 | Status: SHIPPED | OUTPATIENT
Start: 2019-11-07 | End: 2020-05-21

## 2019-12-03 RX ORDER — PROMETHAZINE HYDROCHLORIDE 12.5 MG/1
12.5 TABLET ORAL EVERY 8 HOURS PRN
Qty: 30 TABLET | Refills: 2 | OUTPATIENT
Start: 2019-12-03

## 2019-12-11 ENCOUNTER — OFFICE VISIT (OUTPATIENT)
Dept: PRIMARY CARE CLINIC | Age: 54
End: 2019-12-11
Payer: MEDICARE

## 2019-12-11 VITALS
TEMPERATURE: 99.3 F | SYSTOLIC BLOOD PRESSURE: 140 MMHG | HEIGHT: 66 IN | HEART RATE: 87 BPM | BODY MASS INDEX: 27.8 KG/M2 | WEIGHT: 173 LBS | DIASTOLIC BLOOD PRESSURE: 93 MMHG

## 2019-12-11 DIAGNOSIS — Z12.39 SCREENING FOR BREAST CANCER: ICD-10-CM

## 2019-12-11 DIAGNOSIS — I10 HYPERTENSION, UNSPECIFIED TYPE: ICD-10-CM

## 2019-12-11 DIAGNOSIS — Z02.1 NEED FOR HISTORY AND PHYSICAL EXAMINATION FOR EMPLOYMENT: Primary | ICD-10-CM

## 2019-12-11 DIAGNOSIS — E78.00 HIGH CHOLESTEROL: ICD-10-CM

## 2019-12-11 PROCEDURE — 99396 PREV VISIT EST AGE 40-64: CPT | Performed by: NURSE PRACTITIONER

## 2019-12-11 PROCEDURE — G8484 FLU IMMUNIZE NO ADMIN: HCPCS | Performed by: NURSE PRACTITIONER

## 2019-12-11 RX ORDER — ATORVASTATIN CALCIUM 40 MG/1
40 TABLET, FILM COATED ORAL NIGHTLY
Qty: 30 TABLET | Refills: 2 | Status: SHIPPED | OUTPATIENT
Start: 2019-12-11 | End: 2020-02-27

## 2019-12-11 ASSESSMENT — ENCOUNTER SYMPTOMS
RHINORRHEA: 0
TROUBLE SWALLOWING: 0
COUGH: 0
SORE THROAT: 0
WHEEZING: 0
BACK PAIN: 0
ABDOMINAL PAIN: 0
SHORTNESS OF BREATH: 0
PHOTOPHOBIA: 0
CHEST TIGHTNESS: 0
EYE DISCHARGE: 0
ABDOMINAL DISTENTION: 0
VOMITING: 0
NAUSEA: 0

## 2020-01-02 ENCOUNTER — TELEPHONE (OUTPATIENT)
Dept: PRIMARY CARE CLINIC | Age: 55
End: 2020-01-02

## 2020-01-02 RX ORDER — PROMETHAZINE HYDROCHLORIDE 12.5 MG/1
12.5 TABLET ORAL EVERY 8 HOURS PRN
Qty: 9 TABLET | Refills: 0 | Status: SHIPPED | OUTPATIENT
Start: 2020-01-02 | End: 2020-01-05

## 2020-01-02 NOTE — TELEPHONE ENCOUNTER
Patient states she is nauseous after passing kidney stone and would like promethazine called into pharmacy. Patient advised she may need to come into the office to receive medication. Please review and advise.

## 2020-01-15 ENCOUNTER — OFFICE VISIT (OUTPATIENT)
Dept: PRIMARY CARE CLINIC | Age: 55
End: 2020-01-15
Payer: MEDICARE

## 2020-01-15 VITALS
SYSTOLIC BLOOD PRESSURE: 132 MMHG | WEIGHT: 169.4 LBS | BODY MASS INDEX: 27.34 KG/M2 | DIASTOLIC BLOOD PRESSURE: 89 MMHG | TEMPERATURE: 97.9 F | HEART RATE: 79 BPM

## 2020-01-15 PROCEDURE — 99213 OFFICE O/P EST LOW 20 MIN: CPT | Performed by: NURSE PRACTITIONER

## 2020-01-15 PROCEDURE — G8419 CALC BMI OUT NRM PARAM NOF/U: HCPCS | Performed by: NURSE PRACTITIONER

## 2020-01-15 PROCEDURE — G8484 FLU IMMUNIZE NO ADMIN: HCPCS | Performed by: NURSE PRACTITIONER

## 2020-01-15 PROCEDURE — 3017F COLORECTAL CA SCREEN DOC REV: CPT | Performed by: NURSE PRACTITIONER

## 2020-01-15 PROCEDURE — 1036F TOBACCO NON-USER: CPT | Performed by: NURSE PRACTITIONER

## 2020-01-15 PROCEDURE — G8427 DOCREV CUR MEDS BY ELIG CLIN: HCPCS | Performed by: NURSE PRACTITIONER

## 2020-01-15 RX ORDER — CLOTRIMAZOLE AND BETAMETHASONE DIPROPIONATE 10; .64 MG/G; MG/G
CREAM TOPICAL 2 TIMES DAILY
Qty: 45 G | Refills: 0 | Status: SHIPPED | OUTPATIENT
Start: 2020-01-15 | End: 2020-01-22

## 2020-01-15 RX ORDER — ACETAMINOPHEN 160 MG
2000 TABLET,DISINTEGRATING ORAL DAILY
Qty: 30 CAPSULE | Refills: 0 | Status: SHIPPED | OUTPATIENT
Start: 2020-01-15 | End: 2020-10-27

## 2020-01-15 RX ORDER — PROMETHAZINE HYDROCHLORIDE 12.5 MG/1
12.5 TABLET ORAL 4 TIMES DAILY PRN
Qty: 20 TABLET | Refills: 0 | Status: SHIPPED | OUTPATIENT
Start: 2020-01-15 | End: 2020-02-28

## 2020-01-15 ASSESSMENT — ENCOUNTER SYMPTOMS
VOMITING: 0
BACK PAIN: 0
DIARRHEA: 0
ABDOMINAL PAIN: 0
NAUSEA: 1
SHORTNESS OF BREATH: 0

## 2020-01-15 ASSESSMENT — PATIENT HEALTH QUESTIONNAIRE - PHQ9
2. FEELING DOWN, DEPRESSED OR HOPELESS: 0
SUM OF ALL RESPONSES TO PHQ QUESTIONS 1-9: 0
SUM OF ALL RESPONSES TO PHQ9 QUESTIONS 1 & 2: 0
1. LITTLE INTEREST OR PLEASURE IN DOING THINGS: 0
SUM OF ALL RESPONSES TO PHQ QUESTIONS 1-9: 0

## 2020-01-15 NOTE — PROGRESS NOTES
Visit Information    Have you changed or started any medications since your last visit including any over-the-counter medicines, vitamins, or herbal medicines? no   Are you having any side effects from any of your medications? -  no  Have you stopped taking any of your medications? Is so, why? -  no    Have you seen any other physician or provider since your last visit? No  Have you had any other diagnostic tests since your last visit? No  Have you been seen in the emergency room and/or had an admission to a hospital since we last saw you? No  Have you had your routine dental cleaning in the past 6 months? no    Have you activated your [employer] account? If not, what are your barriers?  Yes     Patient Care Team:  Claudetta Rong, APRN - CNP as PCP - General (Family Medicine)  Claudetta Rong, APRN - CNP as PCP - [facility]  Rosie Pearson DPM as Physician (Podiatry)    Medical History Review  Past Medical, Family, and Social History reviewed and does not contribute to the patient presenting condition    Health Maintenance   Topic Date Due    Pneumococcal 0-64 years Vaccine (1 of 1 - PPSV23) 07/03/1972    HIV screen  07/03/1981    Breast cancer screen  07/03/2016    Shingles Vaccine (1 of 2) 07/03/2016    Flu vaccine (1) 09/01/2019    Cervical cancer screen  07/05/2020    Lipid screen  07/26/2020    Potassium monitoring  07/26/2020    Creatinine monitoring  07/26/2020    Colon cancer screen colonoscopy  03/20/2023    DTaP/Tdap/Td vaccine (3 - Td) 04/12/2023
Medium Adult)   Pulse 79   Temp 97.9 °F (36.6 °C) (Oral)   Wt 169 lb 6.4 oz (76.8 kg)   BMI 27.34 kg/m²   Lab Review   No visits with results within 2 Month(s) from this visit. Latest known visit with results is:   Hospital Outpatient Visit on 07/26/2019   Component Date Value    CREATININE 07/26/2019 1.07*    GFR Non- 07/26/2019 54*    GFR  07/26/2019 >60     GFR Comment 07/26/2019          GFR Staging 07/26/2019 NOT REPORTED        Assessment:       Diagnosis Orders   1. Encounter for medication refill  Cholecalciferol (VITAMIN D3) 50 MCG (2000 UT) CAPS    Vitamin D 25 Hydroxy   2. Dermatitis, unspecified  clotrimazole-betamethasone (LOTRISONE) 1-0.05 % cream   3. Nausea  promethazine (PHENERGAN) 12.5 MG tablet    Basic Metabolic Panel   4. History of kidney stones  promethazine (PHENERGAN) 12.5 MG tablet    Basic Metabolic Panel    XR ABDOMEN (KUB) (SINGLE AP VIEW)       Plan:      Return if symptoms worsen or fail to improve. Orders Placed This Encounter   Medications    clotrimazole-betamethasone (LOTRISONE) 1-0.05 % cream     Sig: Apply topically 2 times daily for 7 days Apply topically 2 times daily. Dispense:  45 g     Refill:  0    Cholecalciferol (VITAMIN D3) 50 MCG (2000 UT) CAPS     Sig: Take 1 capsule by mouth daily     Dispense:  30 capsule     Refill:  0    promethazine (PHENERGAN) 12.5 MG tablet     Sig: Take 1 tablet by mouth 4 times daily as needed for Nausea     Dispense:  20 tablet     Refill:  0     Regarding history of kidney stones provided patient with orders for laboratory testing for BMP, KUB to evaluate for stone, refill of promethazine. Regarding dermatitis for a refill of Lotrisone    Regarding vitamin D, lab testing ordered, vitamin d refilled. Return with any new or worsening sx. Patient given educational materials - see patient instructions. Discussed use, benefit, and side effects of prescribed medications.   All

## 2020-01-15 NOTE — PATIENT INSTRUCTIONS
Patient Education        Nausea and Vomiting: Care Instructions  Your Care Instructions    When you are nauseated, you may feel weak and sweaty and notice a lot of saliva in your mouth. Nausea often leads to vomiting. Most of the time you do not need to worry about nausea and vomiting, but they can be signs of other illnesses. Two common causes of nausea and vomiting are stomach flu and food poisoning. Nausea and vomiting from viral stomach flu will usually start to improve within 24 hours. Nausea and vomiting from food poisoning may last from 12 to 48 hours. The doctor has checked you carefully, but problems can develop later. If you notice any problems or new symptoms, get medical treatment right away. Follow-up care is a key part of your treatment and safety. Be sure to make and go to all appointments, and call your doctor if you are having problems. It's also a good idea to know your test results and keep a list of the medicines you take. How can you care for yourself at home? · To prevent dehydration, drink plenty of fluids, enough so that your urine is light yellow or clear like water. Choose water and other caffeine-free clear liquids until you feel better. If you have kidney, heart, or liver disease and have to limit fluids, talk with your doctor before you increase the amount of fluids you drink. · Rest in bed until you feel better. · When you are able to eat, try clear soups, mild foods, and liquids until all symptoms are gone for 12 to 48 hours. Other good choices include dry toast, crackers, cooked cereal, and gelatin dessert, such as Jell-O. When should you call for help? Call 911 anytime you think you may need emergency care. For example, call if:    · You passed out (lost consciousness).    Call your doctor now or seek immediate medical care if:    · You have symptoms of dehydration, such as:  ? Dry eyes and a dry mouth. ? Passing only a little dark urine. ?  Feeling thirstier than usual.   · You have new or worsening belly pain.     · You have a new or higher fever.     · You vomit blood or what looks like coffee grounds.    Watch closely for changes in your health, and be sure to contact your doctor if:    · You have ongoing nausea and vomiting.     · Your vomiting is getting worse.     · Your vomiting lasts longer than 2 days.     · You are not getting better as expected. Where can you learn more? Go to [email or web address]://[email or web address] test [email or web address].[email or web address] org and sign in to your [employer] account. Enter 25 [id number] in the [employer] box to learn more about \"Nausea and Vomiting: Care Instructions. \"     If you do not have an account, please click on the \"Sign Up Now\" link. Current as of: June 26, 2019  Content Version: 12.3  © 2941-3741 Healthwise, Incorporated. Care instructions adapted under license by [facility] ([facility]). If you have questions about a medical condition or this instruction, always ask your healthcare professional. [place] [address] any warranty or liability for your use of this information.

## 2020-01-27 RX ORDER — CLOPIDOGREL BISULFATE 75 MG/1
TABLET ORAL
Qty: 30 TABLET | Refills: 0 | Status: SHIPPED | OUTPATIENT
Start: 2020-01-27 | End: 2020-02-27

## 2020-01-27 RX ORDER — AMLODIPINE BESYLATE 10 MG/1
TABLET ORAL
Qty: 30 TABLET | Refills: 0 | Status: SHIPPED | OUTPATIENT
Start: 2020-01-27 | End: 2020-02-27

## 2020-01-27 NOTE — TELEPHONE ENCOUNTER
Refills sent.  Please call patient to schedule an PCP follow up appointment before further refills provided

## 2020-02-27 RX ORDER — ATORVASTATIN CALCIUM 40 MG/1
40 TABLET, FILM COATED ORAL NIGHTLY
Qty: 30 TABLET | Refills: 0 | Status: SHIPPED | OUTPATIENT
Start: 2020-02-27 | End: 2020-04-03

## 2020-02-27 NOTE — TELEPHONE ENCOUNTER
Health Maintenance   Topic Date Due    Pneumococcal 0-64 years Vaccine (1 of 1 - PPSV23) 07/03/1972    HIV screen  07/03/1981    Breast cancer screen  07/03/2016    Shingles Vaccine (1 of 2) 07/03/2016    Flu vaccine (1) 09/01/2019    Cervical cancer screen  07/05/2020    Lipid screen  07/26/2020    Potassium monitoring  07/26/2020    Creatinine monitoring  07/26/2020    Colon cancer screen colonoscopy  03/20/2023    DTaP/Tdap/Td vaccine (3 - Td) 04/12/2023    Hepatitis A vaccine  Aged Out    Hepatitis B vaccine  Aged Out    Hib vaccine  Aged Out    Meningococcal (ACWY) vaccine  Aged Out             (applicable per patient's age: Cancer Screenings, Depression Screening, Fall Risk Screening, Immunizations)    Hemoglobin A1C (%)   Date Value   08/22/2018 4.8     LDL Cholesterol (mg/dL)   Date Value   07/26/2019 155 (H)     AST (U/L)   Date Value   07/26/2019 19     ALT (U/L)   Date Value   07/26/2019 17     BUN (mg/dL)   Date Value   07/26/2019 7      (goal A1C is < 7)   (goal LDL is <100) need 30-50% reduction from baseline     BP Readings from Last 3 Encounters:   01/15/20 132/89   12/11/19 (!) 140/93   07/29/19 121/87    (goal /80)      All Future Testing planned in [facility]:  Lab Frequency Next Occurrence   KARON DIGITAL SCREENING AUGMENTED BILATERAL Once 92/88/0137   Basic Metabolic Panel Once 30/52/0361   Vitamin D 25 Hydroxy Once 03/24/2020   XR ABDOMEN (KUB) (SINGLE AP VIEW) Once 04/30/2020       Next Visit Date:  No future appointments.          Patient Active Problem List:     Calculus (=stone)     Essential hypertension     Asthma     Syncope and collapse     Ischemic stroke (HCC)     Right sided weakness     Meralgia paresthetica of right side     Acute right hemiparesis (HCC)     Paresthesias     Numbness     Hypercholesteremia     Chest pain

## 2020-02-28 RX ORDER — PROMETHAZINE HYDROCHLORIDE 12.5 MG/1
TABLET ORAL
Qty: 20 TABLET | Refills: 0 | Status: SHIPPED | OUTPATIENT
Start: 2020-02-28 | End: 2020-04-07

## 2020-03-27 ENCOUNTER — NURSE TRIAGE (OUTPATIENT)
Dept: OTHER | Facility: CLINIC | Age: 55
End: 2020-03-27

## 2020-03-27 NOTE — TELEPHONE ENCOUNTER
Call received from [facility]      Reason for Disposition   [1] Cough occurs AND [2] within 14 days of COVID-19 EXPOSURE    Answer Assessment - Initial Assessment Questions  1. CONFIRMED CASE: \"Who is the person with the confirmed COVID-19 infection that you were exposed to? \"      Son in law has been admitted to hospital, daughter is being tested today - she has all the same symptoms he had     2. PLACE of CONTACT: \"Where were you when you were exposed to COVID-19  (coronavirus disease 2019)? \" (e.g., city, state, country)       Pt's home in [place]     3. TYPE of CONTACT: \"How much contact was there? \" (e.g., live in same house, work in same office, same school)      Visiting in the same home for several hours    4. DATE of CONTACT: \"When did you have contact with a coronavirus patient? \" (e.g., days)      Yesterday    5. DURATION of CONTACT: \"How long were you in contact with the COVID-19 (coronavirus disease) patient? \" (e.g., a few seconds, passed by person, a few minutes, live with the patient)      Several hours and direct contact with hugging    6. SYMPTOMS: \"Do you have any symptoms? \" (e.g., fever, cough, breathing difficulty)        Dry, non productive cough - no sputum. Feels SOB with activity, runny nose and itchy eyes, slight nausea and sore throat - not bad. Denies fever or chills        7. PREGNANCY OR POSTPARTUM: \"Is there any chance you are pregnant? \" \"When was your last menstrual period? \" \"Did you deliver in the last 2 weeks? \"      Denies    8. HIGH RISK: \"Do you have any heart or lung problems? Do you have a weakened immune system? \" (e.g., CHF, COPD, asthma, HIV positive, chemotherapy, renal failure, diabetes mellitus, sickle cell anemia)      Denies    Protocols used: CORONAVIRUS (COVID-19) EXPOSURE-ADULT-    Caller reports symptoms as documented above. Caller informed of disposition. Has 24/7 miguel information and will call PCP today as well for additional recommendations.    Care advice as documented. Reinforced cough etiquette and hand hygiene. Encouraged compliance with social distancing. Please do not respond to the triage nurse through this encounter. Any subsequent communication should be directly with the patient.

## 2020-04-07 RX ORDER — PROMETHAZINE HYDROCHLORIDE 12.5 MG/1
TABLET ORAL
Qty: 20 TABLET | Refills: 0 | Status: SHIPPED | OUTPATIENT
Start: 2020-04-07 | End: 2020-06-03

## 2020-04-18 RX ORDER — TRIAMCINOLONE ACETONIDE 0.25 MG/G
OINTMENT TOPICAL
Qty: 15 G | Refills: 0 | OUTPATIENT
Start: 2020-04-18

## 2020-04-20 ENCOUNTER — OFFICE VISIT (OUTPATIENT)
Dept: PRIMARY CARE CLINIC | Age: 55
End: 2020-04-20
Payer: MEDICARE

## 2020-04-20 ENCOUNTER — HOSPITAL ENCOUNTER (OUTPATIENT)
Age: 55
Setting detail: SPECIMEN
Discharge: HOME | End: 2020-04-20
Payer: MEDICARE

## 2020-04-20 VITALS
WEIGHT: 169 LBS | DIASTOLIC BLOOD PRESSURE: 90 MMHG | SYSTOLIC BLOOD PRESSURE: 132 MMHG | HEART RATE: 84 BPM | TEMPERATURE: 99.7 F | OXYGEN SATURATION: 99 % | BODY MASS INDEX: 27.28 KG/M2

## 2020-04-20 LAB
INFLUENZA A ANTIBODY: NORMAL
INFLUENZA B ANTIBODY: NORMAL
S PYO AG THROAT QL: NORMAL

## 2020-04-20 PROCEDURE — G8427 DOCREV CUR MEDS BY ELIG CLIN: HCPCS | Performed by: FAMILY MEDICINE

## 2020-04-20 PROCEDURE — 1036F TOBACCO NON-USER: CPT | Performed by: FAMILY MEDICINE

## 2020-04-20 PROCEDURE — 87880 STREP A ASSAY W/OPTIC: CPT | Performed by: FAMILY MEDICINE

## 2020-04-20 PROCEDURE — 3017F COLORECTAL CA SCREEN DOC REV: CPT | Performed by: FAMILY MEDICINE

## 2020-04-20 PROCEDURE — 99214 OFFICE O/P EST MOD 30 MIN: CPT | Performed by: FAMILY MEDICINE

## 2020-04-20 PROCEDURE — 87804 INFLUENZA ASSAY W/OPTIC: CPT | Performed by: FAMILY MEDICINE

## 2020-04-20 PROCEDURE — G8419 CALC BMI OUT NRM PARAM NOF/U: HCPCS | Performed by: FAMILY MEDICINE

## 2020-04-20 NOTE — PROGRESS NOTES
noted. No respiratory retractions noted. Wall has symmetrical movement with respirations. POCT Influenza A: neg  POCT Influenza B: neg  POCT Strep: neg  COVID-19 pcr: pending  Assessment:   Encounter Diagnoses   Name Primary?  Flu-like symptoms Yes    Pharyngitis, unspecified etiology     Close exposure to COVID-19 virus          Plan:   Discussed home quarantione. Which she should be in right now    There are no discontinued medications. THE ABOVE NOTED DISCONTINUED MEDS MAY ONLY BE FROM 'CLEANING UP' THE MED LIST AND WERE NOT ACTUALLY CANCELED;  SEE CHART FOR DETAILS! No orders of the defined types were placed in this encounter. Orders Placed This Encounter   Procedures    Covid-19 Ambulatory     Standing Status:   Future     Standing Expiration Date:   4/20/2021     Scheduling Instructions:      Saline media preferred given current shortage of viral transport media but both acceptable    POCT Influenza A/B    POCT rapid strep A     Return in about 1 week (around 4/27/2020). Patient Instructions   The COVID-19 test that was done today can take 1-6 days for results. Until then you should assume you have this disease and adhere to home isolation as described below. When we get the test results back, one of the following readings will be obtained. 1. A positive test means you have the virus. 2.  An inconclusive test means it wasn't sure if you have the virus or not. An inconclusive test result is treated as a positive result and recommendations  are the same as a positive test result. We may ask you to repeat this test in this circumstance. 3.  A negative test means you do not have evidence of the virus.       Prevention steps for People with positive or inconclusive test results or suspected  COVID-19 (including persons under investigation) who do not need to be hospitalized  and   People with confirmed COVID-19 who were hospitalized and determined to be medically stable to go

## 2020-04-20 NOTE — PATIENT INSTRUCTIONS
home  People: As much as possible, you should stay in a specific room and away from other people in your home. Also, you should use a separate bathroom, if available. Animals: You should restrict contact with pets and other animals while you are sick with COVID-19, just like you would around other people. Although there have not been reports of pets or other animals becoming sick with COVID-19, it is still recommended that people sick with COVID-19 limit contact with animals until more information is known about the virus. When possible, have another member of your household care for your animals while you are sick. If you are sick with COVID-19, avoid contact with your pet, including petting, snuggling, being kissed or licked, and sharing food. If you must care for your pet or be around animals while you are sick, wash your hands before and after you interact with pets and wear a facemask. Call ahead before visiting your doctor  If you have a medical appointment, call the healthcare provider and tell them that you have or may have COVID-19. This will help the healthcare providers office take steps to keep other people from getting infected or exposed. Wear a facemask  You should wear a facemask when you are around other people (e.g., sharing a room or vehicle) or pets and before you enter a healthcare providers office. If you are not able to wear a facemask (for example, because it causes trouble breathing), then people who live with you should not stay in the same room with you; they should also wear a facemask if they enter your room. Cover your coughs and sneezes  Cover your mouth and nose with a tissue when you cough or sneeze. Throw used tissues in a lined trash can. Immediately wash your hands with soap and water for at least 20 seconds or, if soap and water are not available, clean your hands with an alcohol-based hand  that contains at least 60% alcohol.   Clean your hands often  Wash your hands often with soap and water for at least 20 seconds, especially after blowing your nose, coughing, or sneezing; going to the bathroom; and before eating or preparing food. If soap and water are not readily available, use an alcohol-based hand  with at least 60% alcohol, covering all surfaces of your hands and rubbing them together until they feel dry. Soap and water are the best option if hands are visibly dirty. Avoid touching your eyes, nose, and mouth with unwashed hands. Avoid sharing personal household items  You should not share dishes, drinking glasses, cups, eating utensils, towels, or bedding with other people or pets in your home. After using these items, they should be washed thoroughly with soap and water. Clean all high-touch surfaces everyday  High touch surfaces include counters, tabletops, doorknobs, bathroom fixtures, toilets, phones, keyboards, tablets, and bedside tables. Also, clean any surfaces that may have blood, stool, or body fluids on them. Use a household cleaning spray or wipe, according to the label instructions. Labels contain instructions for safe and effective use of the cleaning product including precautions you should take when applying the product, such as wearing gloves and making sure you have good ventilation during use of the product. Monitor your symptoms  Seek prompt medical attention if your illness is worsening (e.g., difficulty breathing). Before seeking care, call your healthcare provider and tell them that you have, or are being evaluated for, COVID-19. Put on a facemask before you enter the facility. These steps will help the healthcare providers office to keep other people in the office or waiting room from getting infected or exposed. Persons who are placed under active monitoring or facilitated self-monitoring should follow instructions provided by their local health department or occupational health professionals, as appropriate.  When working

## 2020-04-21 ENCOUNTER — CARE COORDINATION (OUTPATIENT)
Dept: CARE COORDINATION | Age: 55
End: 2020-04-21

## 2020-04-22 LAB — SARS-COV-2, NAA: NOT DETECTED

## 2020-04-24 ENCOUNTER — APPOINTMENT (OUTPATIENT)
Dept: GENERAL RADIOLOGY | Age: 55
End: 2020-04-24
Payer: MEDICARE

## 2020-04-24 ENCOUNTER — HOSPITAL ENCOUNTER (EMERGENCY)
Age: 55
Discharge: HOME | End: 2020-04-24
Attending: EMERGENCY MEDICINE
Payer: MEDICARE

## 2020-04-24 VITALS
HEART RATE: 74 BPM | DIASTOLIC BLOOD PRESSURE: 83 MMHG | HEIGHT: 65 IN | OXYGEN SATURATION: 99 % | SYSTOLIC BLOOD PRESSURE: 119 MMHG | BODY MASS INDEX: 28.16 KG/M2 | WEIGHT: 169 LBS | TEMPERATURE: 99 F | RESPIRATION RATE: 16 BRPM

## 2020-04-24 PROCEDURE — 29125 APPL SHORT ARM SPLINT STATIC: CPT

## 2020-04-24 PROCEDURE — 96375 TX/PRO/DX INJ NEW DRUG ADDON: CPT

## 2020-04-24 PROCEDURE — 96374 THER/PROPH/DIAG INJ IV PUSH: CPT

## 2020-04-24 PROCEDURE — 6360000002 HC RX W HCPCS: Performed by: STUDENT IN AN ORGANIZED HEALTH CARE EDUCATION/TRAINING PROGRAM

## 2020-04-24 PROCEDURE — 73110 X-RAY EXAM OF WRIST: CPT

## 2020-04-24 PROCEDURE — 99283 EMERGENCY DEPT VISIT LOW MDM: CPT

## 2020-04-24 PROCEDURE — 73090 X-RAY EXAM OF FOREARM: CPT

## 2020-04-24 RX ORDER — MORPHINE SULFATE 4 MG/ML
4 INJECTION, SOLUTION INTRAMUSCULAR; INTRAVENOUS ONCE
Status: COMPLETED | OUTPATIENT
Start: 2020-04-24 | End: 2020-04-24

## 2020-04-24 RX ORDER — OXYCODONE HYDROCHLORIDE AND ACETAMINOPHEN 5; 325 MG/1; MG/1
1 TABLET ORAL EVERY 8 HOURS PRN
Qty: 12 TABLET | Refills: 0 | Status: SHIPPED | OUTPATIENT
Start: 2020-04-24 | End: 2020-05-01

## 2020-04-24 RX ORDER — ONDANSETRON 2 MG/ML
4 INJECTION INTRAMUSCULAR; INTRAVENOUS ONCE
Status: COMPLETED | OUTPATIENT
Start: 2020-04-24 | End: 2020-04-24

## 2020-04-24 RX ADMIN — ONDANSETRON 4 MG: 2 INJECTION INTRAMUSCULAR; INTRAVENOUS at 16:30

## 2020-04-24 RX ADMIN — MORPHINE SULFATE 4 MG: 4 INJECTION INTRAVENOUS at 16:31

## 2020-04-24 ASSESSMENT — ENCOUNTER SYMPTOMS
SHORTNESS OF BREATH: 0
VOMITING: 0
NAUSEA: 1

## 2020-04-24 ASSESSMENT — PAIN SCALES - GENERAL
PAINLEVEL_OUTOF10: 10
PAINLEVEL_OUTOF10: 10

## 2020-04-24 ASSESSMENT — PAIN DESCRIPTION - FREQUENCY: FREQUENCY: CONTINUOUS

## 2020-04-24 ASSESSMENT — PAIN DESCRIPTION - ORIENTATION: ORIENTATION: LEFT

## 2020-04-24 ASSESSMENT — PAIN DESCRIPTION - LOCATION: LOCATION: WRIST

## 2020-04-24 NOTE — CONSULTS
reviewed. No pertinent family history. REVIEW OF SYSTEMS:    Constitutional: Negative for fever and chills. HENT: Negative for congestion. Eyes: Negative for blurred vision and double vision. Respiratory: Negative for cough, shortness of breath and wheezing. Cardiovascular: Negative for chest pain and palpitations. Gastrointestinal: Negative for nausea. Negative for vomiting. Musculoskeletal: Positive for myalgias, swelling, and pain left wrist.   Skin: Negative for itching and rash. Neurological: Negative for dizziness, sensory change and headaches. Psychiatric/Behavioral: Negative for depression and suicidal ideas. PHYSICAL EXAM:  /83   Pulse 74   Temp 99 °F (37.2 °C) (Oral)   Resp 16   Ht 5' 5\" (1.651 m)   Wt 169 lb (76.7 kg)   SpO2 99%   BMI 28.12 kg/m²   Gen: AAOx3, NAD, cooperative  Head: normocephalic, atraumatic  Neck: supple, full AROM without pain  Chest: Non labored breathing, b/l clavicles without TTP, crepitus, step off, or deformity  Heart: Regular rate, no dependent edema, distal pulses 2+  LUE: No ecchymoses, abrasions, deformity, or lacerations. Skin intact. Mild soft tissue swelling to wrist. TTP about distal radius with mild crepitus. Full AROM without pain shoulder/elbow/fingers. Compartments soft and compressible. Ulnar/Median/AIN/Radial/PIN gross motor intact. Axillary/Median/Radial/Ulnar nerve SILT. Hand and fingers warm and well-perfused w/ BCR; radial and ulnar pulses 2+. LABS:  No results for input(s): WBC, HGB, HCT, PLT, INR, PTT, NA, K, BUN, CREATININE, GLUCOSE, SEDRATE, CRP in the last 72 hours.     Invalid input(s): PT     Radiology:   Left wrist radiographs reviewed and remarkable for a minimally displaced left extra-articular distal radius fracture with overall maintenance of radial height, inclination, and volar tilt within tolerances; no signs of any additional fractures or radio-opaque foreign bodies    A/P: 49 y.o. female being seen for

## 2020-04-24 NOTE — ED PROVIDER NOTES
DIFFERENTIAL  DIAGNOSIS     PLAN (LABS / IMAGING / EKG):  Orders Placed This Encounter   Procedures    XR RADIUS ULNA LEFT (2 VIEWS)    XR WRIST LEFT (MIN 3 VIEWS)    XR WRIST LEFT (MIN 3 VIEWS)    Inpatient consult to Orthopedic Surgery    Insert peripheral IV       MEDICATIONS ORDERED:  Orders Placed This Encounter   Medications    morphine injection 4 mg    ondansetron (ZOFRAN) injection 4 mg    oxyCODONE-acetaminophen (PERCOCET) 5-325 MG per tablet     Sig: Take 1 tablet by mouth every 8 hours as needed for Pain for up to 7 days. Intended supply: 3 days. Take lowest dose possible to manage pain     Dispense:  12 tablet     Refill:  0         DIAGNOSTIC RESULTS / EMERGENCY DEPARTMENT COURSE / [facility]     LABS:  No results found for this visit on 04/24/20. RADIOLOGY:  XR RADIUS ULNA LEFT (2 VIEWS)   Final Result   Acute distal radial fracture with probable extension into the sigmoid notch. XR WRIST LEFT (MIN 3 VIEWS)   Final Result   Acute distal radial fracture with probable extension into the sigmoid notch. XR WRIST LEFT (MIN 3 VIEWS)    (Results Pending)       EKG  None    All EKG's are interpreted by the Emergency Department Physician who either signs or Co-signs this chart in the absence of a cardiologist.    EMERGENCY DEPARTMENT COURSE:  Patient is a 60-year-old female fall on outstretched hand, there is obvious deformity swelling and tenderness over the distal radius concern for [address] injury, fracture. Will obtain plain film imaging of forearm and left wrist.  Will establish IV access will control pain, and give dose of Zofran secondary to nausea. Patient reporting otherwise healthy no LOC no head trauma no blood thinner use. No other musculoskeletal complaint.     4:38 PM EDT patient's x-ray showing acute distal radius fracture with probable extension to the sigmoid notch, will consult orthopedic surgery, but patient will likely be discharged in sugar tong splint or outpatient follow-up. Patient was seen and evaluated orthopedic surgery, cast was placed to patient's distal radius fracture. Patient to follow-up in 1 week with orthopedic clinic. PROCEDURES:  None    CONSULTS:  IP CONSULT TO ORTHOPEDIC SURGERY    CRITICAL CARE:  None    FINAL IMPRESSION      1. Closed fracture of distal end of left radius, unspecified fracture morphology, initial encounter          DISPOSITION / PLAN     DISPOSITION  dc      PATIENT REFERRED TO:  Milagro Ellis DO  [id number] [facility] [address] [place] [address]    In 1 week  [address] [facility] ED  [address] [facility] [id number] [phone]    If symptoms worsen      DISCHARGE MEDICATIONS:  New Prescriptions    OXYCODONE-ACETAMINOPHEN (PERCOCET) 5-325 MG PER TABLET    Take 1 tablet by mouth every 8 hours as needed for Pain for up to 7 days. Intended supply: 3 days.  Take lowest dose possible to manage pain       Ashwini Garcia DO  Emergency Medicine Resident    (Please note that portions of thisnote were completed with a voice recognition program.  Efforts were made to edit the dictations but occasionally words are mis-transcribed.)        Ashwini Garcia DO  04/24/20 [id number]

## 2020-04-24 NOTE — ED NOTES
Patient states that she was playing with her cat and fell. Patient states that the pain is a 10/10.  Patient wrist is elevated on pillow and ice given      Rhonda Cortes RN  04/24/20 [id number]

## 2020-04-25 ENCOUNTER — CARE COORDINATION (OUTPATIENT)
Dept: CARE COORDINATION | Age: 55
End: 2020-04-25

## 2020-04-27 ENCOUNTER — TELEPHONE (OUTPATIENT)
Dept: ORTHOPEDIC SURGERY | Age: 55
End: 2020-04-27

## 2020-04-27 NOTE — TELEPHONE ENCOUNTER
Spoke with patient regarding the [facility] program. Patient was agreeable to sign up for the program. Patient was agreeable to scheduling an in office appointment with Efren Clay MD.

## 2020-04-30 ENCOUNTER — OFFICE VISIT (OUTPATIENT)
Dept: ORTHOPEDIC SURGERY | Age: 55
End: 2020-04-30
Payer: MEDICARE

## 2020-04-30 VITALS — WEIGHT: 169.09 LBS | HEIGHT: 65 IN | BODY MASS INDEX: 28.17 KG/M2

## 2020-04-30 PROCEDURE — 3017F COLORECTAL CA SCREEN DOC REV: CPT | Performed by: STUDENT IN AN ORGANIZED HEALTH CARE EDUCATION/TRAINING PROGRAM

## 2020-04-30 PROCEDURE — G8419 CALC BMI OUT NRM PARAM NOF/U: HCPCS | Performed by: STUDENT IN AN ORGANIZED HEALTH CARE EDUCATION/TRAINING PROGRAM

## 2020-04-30 PROCEDURE — 1036F TOBACCO NON-USER: CPT | Performed by: STUDENT IN AN ORGANIZED HEALTH CARE EDUCATION/TRAINING PROGRAM

## 2020-04-30 PROCEDURE — 99203 OFFICE O/P NEW LOW 30 MIN: CPT | Performed by: STUDENT IN AN ORGANIZED HEALTH CARE EDUCATION/TRAINING PROGRAM

## 2020-04-30 PROCEDURE — G8427 DOCREV CUR MEDS BY ELIG CLIN: HCPCS | Performed by: STUDENT IN AN ORGANIZED HEALTH CARE EDUCATION/TRAINING PROGRAM

## 2020-04-30 NOTE — PROGRESS NOTES
capsule 0    hydrochlorothiazide (MICROZIDE) 12.5 MG capsule take 1 capsule by mouth every morning 30 capsule 1    triamcinolone (KENALOG) 0.025 % ointment apply to affected area twice a day TO RASH FOR UP TO 2 WEEKS. 15 g 0    ciclopirox (PENLAC) 8 % solution Apply topically nightly. Remove once weekly with alcohol or nail polish remover. 1 Bottle 3    acetaminophen (TYLENOL) 500 MG tablet Take 2 tablets by mouth every 6 hours as needed for Pain 60 tablet 0    gabapentin (NEURONTIN) 100 MG capsule Take 1 capsule by mouth 3 times daily for 30 days. Intended supply: 30 days (Patient not taking: Reported on 4/20/2020) 90 capsule 0     No current facility-administered medications for this visit.         Allergies:    Sulfa antibiotics    Social History:   Social History     Socioeconomic History    Marital status: Single     Spouse name: Not on file    Number of children: Not on file    Years of education: Not on file    Highest education level: Not on file   Occupational History    Not on file   Social Needs    Financial resource strain: Not on file    Food insecurity     Worry: Not on file     Inability: Not on file    Transportation needs     Medical: Not on file     Non-medical: Not on file   Tobacco Use    Smoking status: Never Smoker    Smokeless tobacco: Never Used   Substance and Sexual Activity    Alcohol use: No    Drug use: No    Sexual activity: Not on file   Lifestyle    Physical activity     Days per week: Not on file     Minutes per session: Not on file    Stress: Not on file   Relationships    Social connections     Talks on phone: Not on file     Gets together: Not on file     Attends Judaism service: Not on file     Active member of club or organization: Not on file     Attends meetings of clubs or organizations: Not on file     Relationship status: Not on file    Intimate partner violence     Fear of current or ex partner: Not on file     Emotionally abused: Not on file brace.    3.  We will transition the patient out of her long-arm cast and placed in a short arm cast today. Encouraged her to perform range of motion to her elbow and her fingers to prevent stiffness. She is encouraged to not perform any significant heavy lifting, pushing, or pulling. 4.  We will see the patient back in 5 weeks for repeat evaluation. We will obtain new radiographs of the left wrist at that time out of the cast.  If the patient continues to demonstrate appropriate alignment and healing of the fracture we will place her in a removable wrist brace at that time. 5.  We discussed cast care with the patient she is encouraged to call return to the office if the cast becomes wet or too loose as her swelling goes down. 6.  All questions were answered her satisfaction, she is aware, understands, and voices her willingness to proceed as discussed. Return in about 5 weeks (around 6/4/2020) for Radiographs out of the cast, splint or brace. No orders of the defined types were placed in this encounter. No orders of the defined types were placed in this encounter.        Electronically signed by Ksenia Grace DO on4/30/2020 at 11:14 AM

## 2020-05-08 ENCOUNTER — TELEPHONE (OUTPATIENT)
Dept: ORTHOPEDIC SURGERY | Age: 55
End: 2020-05-08

## 2020-05-08 RX ORDER — OXYCODONE HYDROCHLORIDE AND ACETAMINOPHEN 5; 325 MG/1; MG/1
1 TABLET ORAL EVERY 6 HOURS PRN
Qty: 8 TABLET | Refills: 0 | Status: SHIPPED | OUTPATIENT
Start: 2020-05-08 | End: 2020-05-10

## 2020-05-21 ENCOUNTER — APPOINTMENT (OUTPATIENT)
Dept: CT IMAGING | Age: 55
End: 2020-05-21
Payer: MEDICARE

## 2020-05-21 ENCOUNTER — HOSPITAL ENCOUNTER (EMERGENCY)
Age: 55
Discharge: HOME | End: 2020-05-21
Attending: EMERGENCY MEDICINE
Payer: MEDICARE

## 2020-05-21 ENCOUNTER — TELEPHONE (OUTPATIENT)
Dept: PRIMARY CARE CLINIC | Age: 55
End: 2020-05-21

## 2020-05-21 VITALS
HEIGHT: 65 IN | OXYGEN SATURATION: 99 % | HEART RATE: 80 BPM | BODY MASS INDEX: 28.32 KG/M2 | WEIGHT: 170 LBS | TEMPERATURE: 98.4 F | SYSTOLIC BLOOD PRESSURE: 142 MMHG | DIASTOLIC BLOOD PRESSURE: 96 MMHG | RESPIRATION RATE: 14 BRPM

## 2020-05-21 PROCEDURE — 6370000000 HC RX 637 (ALT 250 FOR IP): Performed by: STUDENT IN AN ORGANIZED HEALTH CARE EDUCATION/TRAINING PROGRAM

## 2020-05-21 PROCEDURE — 99283 EMERGENCY DEPT VISIT LOW MDM: CPT

## 2020-05-21 PROCEDURE — 72125 CT NECK SPINE W/O DYE: CPT

## 2020-05-21 PROCEDURE — 70450 CT HEAD/BRAIN W/O DYE: CPT

## 2020-05-21 RX ORDER — ACETAMINOPHEN 500 MG
1000 TABLET ORAL ONCE
Status: COMPLETED | OUTPATIENT
Start: 2020-05-21 | End: 2020-05-21

## 2020-05-21 RX ORDER — CYCLOBENZAPRINE HCL 5 MG
5 TABLET ORAL 2 TIMES DAILY PRN
Qty: 10 TABLET | Refills: 0 | Status: SHIPPED | OUTPATIENT
Start: 2020-05-21 | End: 2020-05-31

## 2020-05-21 RX ORDER — CYCLOBENZAPRINE HCL 10 MG
10 TABLET ORAL ONCE
Status: COMPLETED | OUTPATIENT
Start: 2020-05-21 | End: 2020-05-21

## 2020-05-21 RX ADMIN — CYCLOBENZAPRINE 10 MG: 10 TABLET, FILM COATED ORAL at 17:29

## 2020-05-21 RX ADMIN — ACETAMINOPHEN 1000 MG: 500 TABLET ORAL at 17:29

## 2020-05-21 ASSESSMENT — PAIN SCALES - GENERAL: PAINLEVEL_OUTOF10: 10

## 2020-05-21 ASSESSMENT — PAIN DESCRIPTION - DESCRIPTORS: DESCRIPTORS: SHARP

## 2020-05-21 ASSESSMENT — PAIN DESCRIPTION - ONSET: ONSET: ON-GOING

## 2020-05-21 ASSESSMENT — PAIN DESCRIPTION - LOCATION: LOCATION: NECK

## 2020-05-21 ASSESSMENT — PAIN DESCRIPTION - FREQUENCY: FREQUENCY: CONTINUOUS

## 2020-05-21 ASSESSMENT — PAIN DESCRIPTION - PROGRESSION: CLINICAL_PROGRESSION: GRADUALLY WORSENING

## 2020-05-21 ASSESSMENT — PAIN DESCRIPTION - ORIENTATION: ORIENTATION: MID;LOWER

## 2020-05-21 ASSESSMENT — PAIN DESCRIPTION - PAIN TYPE: TYPE: ACUTE PAIN

## 2020-05-21 ASSESSMENT — PAIN - FUNCTIONAL ASSESSMENT: PAIN_FUNCTIONAL_ASSESSMENT: PREVENTS OR INTERFERES SOME ACTIVE ACTIVITIES AND ADLS

## 2020-05-21 NOTE — ED PROVIDER NOTES
[address]  Emergency Department Encounter  Emergency Medicine Resident         This patient was evaluated in the Emergency Department for symptoms described in the history of present illness. He/she was evaluated in the context of the global COVID-19 pandemic, which necessitated consideration that the patient might be at risk for infection with the SARS-CoV-2 virus that causes COVID-19. Institutional protocols and algorithms that pertain to the evaluation of patients at risk for COVID-19 are in a state of rapid change based on information released by regulatory bodies including the CDC and federal and state organizations. These policies and algorithms were followed during the patient's care in the ED. Pt Name: Dayanna Sunshine  MRN: [id number]  [place] 1965  Date of evaluation: 5/21/20  PCP:  [address]       Chief Complaint   Patient presents with    Neck Pain     [place] 2 weeks ago, fractured left wrist, noted started having neck pain about 3 days later, denies numbness/tingling or any respiratory complaints       HISTORY OF PRESENT ILLNESS  (Location/Symptom, Timing/Onset, Context/Setting, Quality, Duration, Modifying Factors, Severity.)    Dayanna Sunshine is a 49 y.o. female who presents with fall x2 weeks ago and progressively worsening neck pain. She has had pain in the middle of her neck for the past 2 weeks has been getting progressively worse 10 out of 10 pain worse with any sort of movement especially looking down. No numbness or tingling that radiates down the arms. No changes in vision or weakness numbness headache or altered sensation. Denies any jaw pain. Denies any fever. She has been taking Percocets for her pain is provided moderate relief. PAST MEDICAL / SURGICAL / SOCIAL / FAMILY HISTORY    has a past medical history of Asthma, CVA (cerebral vascular accident) ([facility] [address] Hypertension, and Kidney stone.      has a past surgical history that includes Cholecystectomy and Lithotripsy. Social History     Socioeconomic History    Marital status: Single     Spouse name: Not on file    Number of children: Not on file    Years of education: Not on file    Highest education level: Not on file   Occupational History    Not on file   Social Needs    Financial resource strain: Not on file    Food insecurity     Worry: Not on file     Inability: Not on file    Transportation needs     Medical: Not on file     Non-medical: Not on file   Tobacco Use    Smoking status: Never Smoker    Smokeless tobacco: Never Used   Substance and Sexual Activity    Alcohol use: No    Drug use: No    Sexual activity: Not on file   Lifestyle    Physical activity     Days per week: Not on file     Minutes per session: Not on file    Stress: Not on file   Relationships    Social connections     Talks on phone: Not on file     Gets together: Not on file     Attends Zoroastrianism service: Not on file     Active member of club or organization: Not on file     Attends meetings of clubs or organizations: Not on file     Relationship status: Not on file    Intimate partner violence     Fear of current or ex partner: Not on file     Emotionally abused: Not on file     Physically abused: Not on file     Forced sexual activity: Not on file   Other Topics Concern    Not on file   Social History Narrative    Not on file       History reviewed. No pertinent family history. Allergies:    Sulfa antibiotics    Home Medications:  Prior to Admission medications    Medication Sig Start Date End Date Taking?  Authorizing Provider   RA ASPIRIN EC 81 MG EC tablet take 1 tablet by mouth once daily 5/11/20   [facility]   promethazine (PHENERGAN) 12.5 MG tablet take 1 tablet by mouth four times a day if needed for nausea 4/7/20   [facility]   atorvastatin (LIPITOR) 40 MG tablet take 1 tablet by mouth nightly 4/3/20   [facility]   amLODIPine degrees before pain prohibits further motion. There is tenderness palpation over spinous process of C7-T2 midline. Associated muscle spasms. No rash. No meningismus. No tenderness palpation over bilateral temporal arteries. No temporal beading. DIFFERENTIAL  DIAGNOSIS/ MDM   PLAN (LABS / IMAGING / EKG):  Orders Placed This Encounter   Procedures    CT HEAD WO CONTRAST    CT CERVICAL SPINE WO CONTRAST       MEDICATIONS ORDERED:  Orders Placed This Encounter   Medications    acetaminophen (TYLENOL) tablet 1,000 mg    cyclobenzaprine (FLEXERIL) tablet 10 mg         DDX:       MDM:    Tasha Palacio is a 49 y.o. female who presents with neck pain. Was seen at previous visit for fracture of left arm evaluated by orthopedic surgery. Did not have neck pain at that time. Denies any other injuries since then. Denies further falls. Denies weakness numbness or altered sensation. Slightly hypertensive afebrile. Tenderness palpation over C7-T2 midline. Associated muscle spasms. No meningismus. No temporal beating. Due to patient having worsening pain as well as headache and aspirin use, will obtain CT cervical and CT head. Pain control reassess. EMERGENCY DEPARTMENT COURSE:  ED Course as of May 21 1815   Thu May 21, 2020   1814 NO apparent acute traumatic injury on [facility] or CT cervical    [RB]      ED Course User Index  [RB] Mya Lucio DO         DIAGNOSTIC RESULTS / EMERGENCYDEPARTMENT COURSE   LABS:  Labs Reviewed - No data to display    RADIOLOGY:  Ct Head Wo Contrast    Result Date: 5/21/2020  EXAMINATION: CT OF THE HEAD WITHOUT CONTRAST  5/21/2020 5:42 pm TECHNIQUE: CT of the head was performed without the administration of intravenous contrast. Dose modulation, iterative reconstruction, and/or weight based adjustment of the mA/kV was utilized to reduce the radiation dose to as low as reasonably achievable.  COMPARISON: CT scan dated August 22, 2018, prior CT a of the head dated

## 2020-05-22 ENCOUNTER — CARE COORDINATION (OUTPATIENT)
Dept: CARE COORDINATION | Age: 55
End: 2020-05-22

## 2020-05-22 NOTE — CARE COORDINATION
Second ED follow up call attempted for Covid-19 screen. Call defaulted to . Message left requesting return call. Call back info provided.

## 2020-06-03 RX ORDER — PROMETHAZINE HYDROCHLORIDE 12.5 MG/1
TABLET ORAL
Qty: 20 TABLET | Refills: 0 | Status: SHIPPED | OUTPATIENT
Start: 2020-06-03 | End: 2020-07-07

## 2020-06-04 ENCOUNTER — OFFICE VISIT (OUTPATIENT)
Dept: ORTHOPEDIC SURGERY | Age: 55
End: 2020-06-04
Payer: MEDICARE

## 2020-06-04 VITALS — BODY MASS INDEX: 28.32 KG/M2 | HEIGHT: 65 IN | WEIGHT: 169.97 LBS

## 2020-06-04 PROBLEM — S52.502D CLOSED FRACTURE OF LOWER END OF LEFT RADIUS WITH ROUTINE HEALING: Status: ACTIVE | Noted: 2020-06-04

## 2020-06-04 PROCEDURE — 99213 OFFICE O/P EST LOW 20 MIN: CPT | Performed by: ORTHOPAEDIC SURGERY

## 2020-06-04 PROCEDURE — 1036F TOBACCO NON-USER: CPT | Performed by: ORTHOPAEDIC SURGERY

## 2020-06-04 PROCEDURE — G8427 DOCREV CUR MEDS BY ELIG CLIN: HCPCS | Performed by: ORTHOPAEDIC SURGERY

## 2020-06-04 PROCEDURE — G8419 CALC BMI OUT NRM PARAM NOF/U: HCPCS | Performed by: ORTHOPAEDIC SURGERY

## 2020-06-04 PROCEDURE — 3017F COLORECTAL CA SCREEN DOC REV: CPT | Performed by: ORTHOPAEDIC SURGERY

## 2020-07-05 NOTE — TELEPHONE ENCOUNTER
Health Maintenance   Topic Date Due    Pneumococcal 0-64 years Vaccine (1 of 1 - PPSV23) 07/03/1972    HIV screen  07/03/1981    Breast cancer screen  07/03/2016    Shingles Vaccine (1 of 2) 07/03/2016    Cervical cancer screen  07/05/2020    Lipid screen  07/26/2020    Potassium monitoring  07/26/2020    Creatinine monitoring  07/26/2020    Flu vaccine (1) 09/01/2020    Colon cancer screen colonoscopy  03/20/2023    DTaP/Tdap/Td vaccine (3 - Td) 04/12/2023    Hepatitis A vaccine  Aged Out    Hepatitis B vaccine  Aged Out    Hib vaccine  Aged Out    Meningococcal (ACWY) vaccine  Aged Out             (applicable per patient's age: Cancer Screenings, Depression Screening, Fall Risk Screening, Immunizations)    Hemoglobin A1C (%)   Date Value   08/22/2018 4.8     LDL Cholesterol (mg/dL)   Date Value   07/26/2019 155 (H)     AST (U/L)   Date Value   07/26/2019 19     ALT (U/L)   Date Value   07/26/2019 17     BUN (mg/dL)   Date Value   07/26/2019 7      (goal A1C is < 7)   (goal LDL is <100) need 30-50% reduction from baseline     BP Readings from Last 3 Encounters:   05/21/20 (!) 142/96   04/24/20 119/83   04/20/20 (!) 132/90    (goal /80)      All Future Testing planned in [facility]:  Lab Frequency Next Occurrence   Basic Metabolic Panel Once 64/81/2473   Vitamin D 25 Hydroxy Once 07/01/2020   XR ABDOMEN (KUB) (SINGLE AP VIEW) Once 04/30/2020       Next Visit Date:  Future Appointments   Date Time Provider Jose Rodriguez   7/21/2020 10:00 AM Lex Goodson DO [address] [address]            Patient Active Problem List:     Calculus (=stone)     Essential hypertension     Asthma     Syncope and collapse     Ischemic stroke ([facility] [address]     Right sided weakness     Meralgia paresthetica of right side     Acute right hemiparesis (HCC)     Paresthesias     Numbness     Hypercholesteremia     Chest pain     Closed fracture of lower end of left radius with routine healing

## 2020-07-06 RX ORDER — CLOPIDOGREL BISULFATE 75 MG/1
TABLET ORAL
Qty: 30 TABLET | Refills: 1 | Status: SHIPPED | OUTPATIENT
Start: 2020-07-06 | End: 2020-09-01

## 2020-07-06 RX ORDER — AMLODIPINE BESYLATE 10 MG/1
TABLET ORAL
Qty: 30 TABLET | Refills: 1 | Status: SHIPPED | OUTPATIENT
Start: 2020-07-06 | End: 2020-09-01

## 2020-07-07 RX ORDER — PROMETHAZINE HYDROCHLORIDE 12.5 MG/1
TABLET ORAL
Qty: 20 TABLET | Refills: 0 | Status: SHIPPED | OUTPATIENT
Start: 2020-07-07 | End: 2020-09-14

## 2020-09-01 RX ORDER — AMLODIPINE BESYLATE 10 MG/1
TABLET ORAL
Qty: 30 TABLET | Refills: 1 | Status: SHIPPED | OUTPATIENT
Start: 2020-09-01 | End: 2020-10-27

## 2020-09-01 RX ORDER — CLOPIDOGREL BISULFATE 75 MG/1
TABLET ORAL
Qty: 30 TABLET | Refills: 1 | Status: SHIPPED | OUTPATIENT
Start: 2020-09-01 | End: 2020-10-27

## 2020-09-15 ENCOUNTER — TELEMEDICINE (OUTPATIENT)
Dept: PRIMARY CARE CLINIC | Age: 55
End: 2020-09-15
Payer: MEDICARE

## 2020-09-15 PROCEDURE — 3017F COLORECTAL CA SCREEN DOC REV: CPT | Performed by: PHYSICIAN ASSISTANT

## 2020-09-15 PROCEDURE — 99213 OFFICE O/P EST LOW 20 MIN: CPT | Performed by: PHYSICIAN ASSISTANT

## 2020-09-15 PROCEDURE — G8427 DOCREV CUR MEDS BY ELIG CLIN: HCPCS | Performed by: PHYSICIAN ASSISTANT

## 2020-09-15 RX ORDER — ATORVASTATIN CALCIUM 40 MG/1
40 TABLET, FILM COATED ORAL NIGHTLY
Qty: 30 TABLET | Refills: 5 | Status: SHIPPED | OUTPATIENT
Start: 2020-09-15 | End: 2021-03-19

## 2020-09-15 RX ORDER — ASPIRIN 81 MG/1
81 TABLET ORAL DAILY
Qty: 30 TABLET | Refills: 5 | Status: SHIPPED | OUTPATIENT
Start: 2020-09-15 | End: 2021-03-23

## 2020-09-15 RX ORDER — PROMETHAZINE HYDROCHLORIDE 12.5 MG/1
12.5 TABLET ORAL DAILY PRN
Qty: 30 TABLET | Refills: 0 | Status: CANCELLED | OUTPATIENT
Start: 2020-09-15

## 2020-09-15 ASSESSMENT — ENCOUNTER SYMPTOMS: NAUSEA: 1

## 2020-09-15 NOTE — PROGRESS NOTES
Madhu Dean is a 48 y.o. female evaluated virtual visit via M-Audio video on 9/15/2020. Consent:  She and/or health care decision maker is aware that that she may receive a bill for this telephone service, depending on her insurance coverage, and has provided verbal consent to proceed: Yes      Documentation:  I communicated with the patient and/or health care decision maker about nausea, HLD. Details of this discussion including any medical advice provided below      I affirm this is a Patient Initiated Episode with an Established Patient who has not had a related appointment within my department in the past 7 days or scheduled within the next 24 hours. Total Time: minutes: 11-20 minutes    Note: not billable if this call serves to triage the patient into an appointment for the relevant concern      Cade Chavarria                  9/15/2020    TELEHEALTH EVALUATION -- Audio/Visual (During SYHRR-80 public health emergency)    Patient and physician are located in their individual homes    HPI:    Madhu Dean (:  1965) has requested an audio only/video evaluation for the following concern(s):    Patient is here for virtual visit via video for follow-up for nausea, HLD. Patient states she is compliant with medications. Patient states nausea occurs \"with certain foods\", denies any vomiting. Patient denies ever been evaluated by GI. Patient requested medication refill. Informed patient she will be referred to GI for evaluation. Discussed HLD in depth with patient, patient requested medication refill. Patient denies being seen by cardiology or endocrinology at all this year, encourage patient to call and schedule appointment for follow-up. Patient requesting additional medication refill. Labs reviewed, informed patient labs will be ordered and to have completed before follow-up appointment, patient voiced understanding.     Health maintenance reviewed, discussed breast and cervical cancer in depth with patient, mammogram ordered, patient is scheduled for Pap smear at next office visit. Medication reviewed, refilled Aspirin 81 mg, Lipitor 40 mg. HPI    Review of Systems   Constitutional: Negative for chills and fever. HENT: Negative for congestion. Respiratory: Negative for cough, shortness of breath and wheezing. Cardiovascular: Negative for chest pain. Gastrointestinal: Positive for nausea. Negative for constipation, diarrhea and vomiting. Genitourinary: Negative for dysuria, frequency and urgency. Musculoskeletal: Negative for back pain, myalgias and neck pain. Neurological: Negative for headaches. Prior to Visit Medications    Medication Sig Taking? Authorizing Provider   aspirin (RA ASPIRIN EC) 81 MG EC tablet Take 1 tablet by mouth daily Yes Medina Curiel PA-C   atorvastatin (LIPITOR) 40 MG tablet Take 1 tablet by mouth nightly Yes Medina Curiel PA-C   promethazine (PHENERGAN) 12.5 MG tablet Take 1 tablet by mouth daily as needed for Nausea Yes Medina Curiel PA-C   clopidogrel (PLAVIX) 75 MG tablet take 1 tablet by mouth once daily Yes Medina Curiel PA-C   amLODIPine (NORVASC) 10 MG tablet take 1 tablet by mouth once daily Yes Medina Curiel PA-C   Cholecalciferol (VITAMIN D3) 50 MCG (2000 UT) CAPS Take 1 capsule by mouth daily Yes JESIKA Pedersen CNP   triamcinolone (KENALOG) 0.025 % ointment apply to affected area twice a day TO RASH FOR UP TO 2 WEEKS. Yes JESIKA Grady CNP   ciclopirox (PENLAC) 8 % solution Apply topically nightly. Remove once weekly with alcohol or nail polish remover.  Yes Gloria Leal DPM       Social History     Tobacco Use    Smoking status: Never Smoker    Smokeless tobacco: Never Used   Substance Use Topics    Alcohol use: No    Drug use: No        Past Medical History:   Diagnosis Date    Asthma     CVA (cerebral vascular accident) ([facility] [address]     Hypertension     Kidney stone

## 2020-09-15 NOTE — PATIENT INSTRUCTIONS
· Call and schedule mammogram  · Called and schedule appointment with GI for evaluation of nausea  · Get labs completed in next several weeks          CHECK OUT CALL NEEDED  OFFICE APPT IN 5 MONTHS FOR PAP, MAKE SURE ITS 40 MINS  PROVIDE GI OFFICE INFO TO PT  PROVIDE SCHEDULING NUMBER FOR MAMMOGRAM  MAIL LABS TO PT  MAIL LABS AND [facility]

## 2020-09-23 ASSESSMENT — ENCOUNTER SYMPTOMS
SHORTNESS OF BREATH: 0
CONSTIPATION: 0
COUGH: 0
DIARRHEA: 0
WHEEZING: 0
BACK PAIN: 0
VOMITING: 0

## 2020-09-24 ENCOUNTER — TELEPHONE (OUTPATIENT)
Dept: GASTROENTEROLOGY | Age: 55
End: 2020-09-24

## 2020-10-22 ENCOUNTER — HOSPITAL ENCOUNTER (OUTPATIENT)
Age: 55
Discharge: HOME | End: 2020-10-22
Payer: MEDICARE

## 2020-10-22 LAB
ALBUMIN SERPL-MCNC: 4.7 G/DL (ref 3.5–5.2)
ALBUMIN/GLOBULIN RATIO: 1.2 (ref 1–2.5)
ALP BLD-CCNC: 192 U/L (ref 35–104)
ALT SERPL-CCNC: 16 U/L (ref 5–33)
ANION GAP SERPL CALCULATED.3IONS-SCNC: 9 MMOL/L (ref 9–17)
AST SERPL-CCNC: 17 U/L
BILIRUB SERPL-MCNC: 0.52 MG/DL (ref 0.3–1.2)
BUN BLDV-MCNC: 6 MG/DL (ref 6–20)
BUN/CREAT BLD: ABNORMAL (ref 9–20)
CALCIUM SERPL-MCNC: 10.7 MG/DL (ref 8.6–10.4)
CHLORIDE BLD-SCNC: 102 MMOL/L (ref 98–107)
CHOLESTEROL/HDL RATIO: 3.3
CHOLESTEROL: 251 MG/DL
CO2: 26 MMOL/L (ref 20–31)
CREAT SERPL-MCNC: 1.05 MG/DL (ref 0.5–0.9)
GFR AFRICAN AMERICAN: >60 ML/MIN
GFR NON-AFRICAN AMERICAN: 55 ML/MIN
GFR SERPL CREATININE-BSD FRML MDRD: ABNORMAL ML/MIN/{1.73_M2}
GFR SERPL CREATININE-BSD FRML MDRD: ABNORMAL ML/MIN/{1.73_M2}
GLUCOSE BLD-MCNC: 90 MG/DL (ref 70–99)
HDLC SERPL-MCNC: 75 MG/DL
LDL CHOLESTEROL: 153 MG/DL (ref 0–130)
POTASSIUM SERPL-SCNC: 3.9 MMOL/L (ref 3.7–5.3)
SODIUM BLD-SCNC: 137 MMOL/L (ref 135–144)
TOTAL PROTEIN: 8.7 G/DL (ref 6.4–8.3)
TRIGL SERPL-MCNC: 117 MG/DL
VITAMIN D 25-HYDROXY: 21.9 NG/ML (ref 30–100)
VLDLC SERPL CALC-MCNC: ABNORMAL MG/DL (ref 1–30)

## 2020-10-22 PROCEDURE — 36415 COLL VENOUS BLD VENIPUNCTURE: CPT

## 2020-10-22 PROCEDURE — 80061 LIPID PANEL: CPT

## 2020-10-22 PROCEDURE — 82306 VITAMIN D 25 HYDROXY: CPT

## 2020-10-22 PROCEDURE — 80053 COMPREHEN METABOLIC PANEL: CPT

## 2020-10-23 NOTE — RESULT ENCOUNTER NOTE
Elevated cholesterol, vitamin D deficiency, stable elevated kidney function, otherwise normal, confirm if pt was fasting, still taking the Lipitor 40 mg, does she take OTC vitamin D/calcium medication, if so what is the dosage

## 2020-10-27 RX ORDER — CLOPIDOGREL BISULFATE 75 MG/1
TABLET ORAL
Qty: 90 TABLET | Refills: 0 | Status: SHIPPED | OUTPATIENT
Start: 2020-10-27 | End: 2021-03-23

## 2020-10-27 RX ORDER — AMLODIPINE BESYLATE 10 MG/1
TABLET ORAL
Qty: 90 TABLET | Refills: 0 | Status: SHIPPED | OUTPATIENT
Start: 2020-10-27 | End: 2021-01-19

## 2020-10-27 NOTE — TELEPHONE ENCOUNTER
Health Maintenance   Topic Date Due    Pneumococcal 0-64 years Vaccine (1 of 1 - PPSV23) 07/03/1972    Breast cancer screen  07/03/2016    Shingles Vaccine (1 of 2) 07/03/2016    Cervical cancer screen  07/05/2020    Flu vaccine (1) 09/01/2020    Lipid screen  10/22/2021    Potassium monitoring  10/22/2021    Creatinine monitoring  10/22/2021    Colon cancer screen colonoscopy  03/20/2023    DTaP/Tdap/Td vaccine (3 - Td) 04/12/2023    Hepatitis A vaccine  Aged Out    Hepatitis B vaccine  Aged Out    Hib vaccine  Aged Out    Meningococcal (ACWY) vaccine  Aged Out    HIV screen  Discontinued             (applicable per patient's age: Cancer Screenings, Depression Screening, Fall Risk Screening, Immunizations)    Hemoglobin A1C (%)   Date Value   08/22/2018 4.8     LDL Cholesterol (mg/dL)   Date Value   10/22/2020 153 (H)     AST (U/L)   Date Value   10/22/2020 17     ALT (U/L)   Date Value   10/22/2020 16     BUN (mg/dL)   Date Value   10/22/2020 6      (goal A1C is < 7)   (goal LDL is <100) need 30-50% reduction from baseline     BP Readings from Last 3 Encounters:   05/21/20 (!) 142/96   04/24/20 119/83   04/20/20 (!) 132/90    (goal /80)      All Future Testing planned in [facility]:  Lab Frequency Next Occurrence   XR ABDOMEN (KUB) (SINGLE AP VIEW) Once 04/30/2020   XR WRIST LEFT (MIN 3 VIEWS) Once 08/17/2020   KARON DIGITAL SCREEN W OR WO CAD BILATERAL Once 01/01/2021       Next Visit Date:  Future Appointments   Date Time Provider Jose Rodriguez   10/27/2020  9:45 AM Carlos Carey MD sv gr lks [facility]   11/4/2020  9:00 AM STA SCR MAMMO RM 2 STAZ MAMMO [facility]   2/15/2021  9:20 AM Cesilia Martin PA-C [facility]            Patient Active Problem List:     Calculus (=stone)     Essential hypertension     Asthma     Syncope and collapse     Ischemic stroke ([facility] [address]     Right sided weakness     Meralgia paresthetica of right side     Acute right hemiparesis (HCC)     Paresthesias Numbness     Hypercholesteremia     Chest pain     Closed fracture of lower end of left radius with routine healing

## 2020-11-12 NOTE — TELEPHONE ENCOUNTER
Patient wants to reschedule appt  thru [employer].  returned call lvm that I would cancel appt and for her to return our call to reschedule

## 2020-11-22 ENCOUNTER — HOSPITAL ENCOUNTER (EMERGENCY)
Age: 55
Discharge: HOME | End: 2020-11-22
Attending: EMERGENCY MEDICINE
Payer: MEDICARE

## 2020-11-22 VITALS
HEART RATE: 84 BPM | SYSTOLIC BLOOD PRESSURE: 144 MMHG | HEIGHT: 65 IN | OXYGEN SATURATION: 99 % | BODY MASS INDEX: 27.49 KG/M2 | DIASTOLIC BLOOD PRESSURE: 94 MMHG | RESPIRATION RATE: 16 BRPM | TEMPERATURE: 97.2 F | WEIGHT: 165 LBS

## 2020-11-22 LAB
-: ABNORMAL
ABSOLUTE EOS #: 0.04 K/UL (ref 0–0.4)
ABSOLUTE IMMATURE GRANULOCYTE: 0.04 K/UL (ref 0–0.3)
ABSOLUTE LYMPH #: 1.58 K/UL (ref 1–4.8)
ABSOLUTE MONO #: 0.31 K/UL (ref 0.1–0.8)
ALBUMIN SERPL-MCNC: 4.3 G/DL (ref 3.5–5.2)
ALBUMIN/GLOBULIN RATIO: 1.1 (ref 1–2.5)
ALP BLD-CCNC: 228 U/L (ref 35–104)
ALT SERPL-CCNC: 23 U/L (ref 5–33)
AMORPHOUS: ABNORMAL
ANION GAP SERPL CALCULATED.3IONS-SCNC: 9 MMOL/L (ref 9–17)
AST SERPL-CCNC: 26 U/L
BACTERIA: ABNORMAL
BASOPHILS # BLD: 1 % (ref 0–2)
BASOPHILS ABSOLUTE: 0.04 K/UL (ref 0–0.2)
BILIRUB SERPL-MCNC: 0.45 MG/DL (ref 0.3–1.2)
BILIRUBIN URINE: NEGATIVE
BUN BLDV-MCNC: 11 MG/DL (ref 6–20)
BUN/CREAT BLD: ABNORMAL (ref 9–20)
CALCIUM SERPL-MCNC: 10.7 MG/DL (ref 8.6–10.4)
CASTS UA: ABNORMAL /LPF (ref 0–8)
CHLORIDE BLD-SCNC: 105 MMOL/L (ref 98–107)
CO2: 23 MMOL/L (ref 20–31)
COLOR: YELLOW
CREAT SERPL-MCNC: 0.97 MG/DL (ref 0.5–0.9)
CRYSTALS, UA: ABNORMAL /HPF
DIFFERENTIAL TYPE: ABNORMAL
EOSINOPHILS RELATIVE PERCENT: 1 % (ref 1–4)
EPITHELIAL CELLS UA: ABNORMAL /HPF (ref 0–5)
GFR AFRICAN AMERICAN: >60 ML/MIN
GFR NON-AFRICAN AMERICAN: 60 ML/MIN
GFR SERPL CREATININE-BSD FRML MDRD: ABNORMAL ML/MIN/{1.73_M2}
GFR SERPL CREATININE-BSD FRML MDRD: ABNORMAL ML/MIN/{1.73_M2}
GLUCOSE BLD-MCNC: 103 MG/DL (ref 70–99)
GLUCOSE URINE: NEGATIVE
HCT VFR BLD CALC: 39.8 % (ref 36.3–47.1)
HEMOGLOBIN: 13.1 G/DL (ref 11.9–15.1)
IMMATURE GRANULOCYTES: 1 %
KETONES, URINE: NEGATIVE
LACTIC ACID, WHOLE BLOOD: 1.6 MMOL/L (ref 0.7–2.1)
LACTIC ACID: NORMAL MMOL/L
LEUKOCYTE ESTERASE, URINE: ABNORMAL
LIPASE: 49 U/L (ref 13–60)
LYMPHOCYTES # BLD: 36 % (ref 24–44)
MCH RBC QN AUTO: 29.2 PG (ref 25.2–33.5)
MCHC RBC AUTO-ENTMCNC: 32.9 G/DL (ref 28.4–34.8)
MCV RBC AUTO: 88.8 FL (ref 82.6–102.9)
MONOCYTES # BLD: 7 % (ref 1–7)
MORPHOLOGY: NORMAL
MUCUS: ABNORMAL
NITRITE, URINE: NEGATIVE
NRBC AUTOMATED: 0 PER 100 WBC
OTHER OBSERVATIONS UA: ABNORMAL
PDW BLD-RTO: 11.5 % (ref 11.8–14.4)
PH UA: 7 (ref 5–8)
PLATELET # BLD: ABNORMAL K/UL (ref 138–453)
PLATELET ESTIMATE: ABNORMAL
PLATELET, FLUORESCENCE: 187 K/UL (ref 138–453)
PLATELET, IMMATURE FRACTION: 11.4 % (ref 1.1–10.3)
PMV BLD AUTO: ABNORMAL FL (ref 8.1–13.5)
POTASSIUM SERPL-SCNC: 4.3 MMOL/L (ref 3.7–5.3)
PROTEIN UA: ABNORMAL
RBC # BLD: 4.48 M/UL (ref 3.95–5.11)
RBC # BLD: ABNORMAL 10*6/UL
RBC UA: ABNORMAL /HPF (ref 0–4)
RENAL EPITHELIAL, UA: ABNORMAL /HPF
SEG NEUTROPHILS: 54 % (ref 36–66)
SEGMENTED NEUTROPHILS ABSOLUTE COUNT: 2.39 K/UL (ref 1.8–7.7)
SODIUM BLD-SCNC: 137 MMOL/L (ref 135–144)
SPECIFIC GRAVITY UA: 1.01 (ref 1–1.03)
TOTAL PROTEIN: 8.3 G/DL (ref 6.4–8.3)
TRICHOMONAS: ABNORMAL
TURBIDITY: CLEAR
URINE HGB: NEGATIVE
UROBILINOGEN, URINE: NORMAL
WBC # BLD: 4.4 K/UL (ref 3.5–11.3)
WBC # BLD: ABNORMAL 10*3/UL
WBC UA: ABNORMAL /HPF (ref 0–5)
YEAST: ABNORMAL

## 2020-11-22 PROCEDURE — 85055 RETICULATED PLATELET ASSAY: CPT

## 2020-11-22 PROCEDURE — 6360000002 HC RX W HCPCS: Performed by: STUDENT IN AN ORGANIZED HEALTH CARE EDUCATION/TRAINING PROGRAM

## 2020-11-22 PROCEDURE — 85025 COMPLETE CBC W/AUTO DIFF WBC: CPT

## 2020-11-22 PROCEDURE — 80053 COMPREHEN METABOLIC PANEL: CPT

## 2020-11-22 PROCEDURE — 81001 URINALYSIS AUTO W/SCOPE: CPT

## 2020-11-22 PROCEDURE — 83605 ASSAY OF LACTIC ACID: CPT

## 2020-11-22 PROCEDURE — 99284 EMERGENCY DEPT VISIT MOD MDM: CPT

## 2020-11-22 PROCEDURE — 83690 ASSAY OF LIPASE: CPT

## 2020-11-22 PROCEDURE — 96375 TX/PRO/DX INJ NEW DRUG ADDON: CPT

## 2020-11-22 PROCEDURE — 96374 THER/PROPH/DIAG INJ IV PUSH: CPT

## 2020-11-22 PROCEDURE — 2580000003 HC RX 258: Performed by: STUDENT IN AN ORGANIZED HEALTH CARE EDUCATION/TRAINING PROGRAM

## 2020-11-22 RX ORDER — KETOROLAC TROMETHAMINE 30 MG/ML
30 INJECTION, SOLUTION INTRAMUSCULAR; INTRAVENOUS ONCE
Status: COMPLETED | OUTPATIENT
Start: 2020-11-22 | End: 2020-11-22

## 2020-11-22 RX ORDER — ONDANSETRON 4 MG/1
4 TABLET, ORALLY DISINTEGRATING ORAL EVERY 8 HOURS PRN
Qty: 4 TABLET | Refills: 0 | Status: SHIPPED | OUTPATIENT
Start: 2020-11-22 | End: 2021-03-23

## 2020-11-22 RX ORDER — KETOROLAC TROMETHAMINE 10 MG/1
10 TABLET, FILM COATED ORAL EVERY 6 HOURS PRN
Qty: 20 TABLET | Refills: 0 | Status: SHIPPED | OUTPATIENT
Start: 2020-11-22 | End: 2021-03-23

## 2020-11-22 RX ORDER — METOCLOPRAMIDE HYDROCHLORIDE 5 MG/ML
10 INJECTION INTRAMUSCULAR; INTRAVENOUS ONCE
Status: COMPLETED | OUTPATIENT
Start: 2020-11-22 | End: 2020-11-22

## 2020-11-22 RX ORDER — 0.9 % SODIUM CHLORIDE 0.9 %
1000 INTRAVENOUS SOLUTION INTRAVENOUS ONCE
Status: COMPLETED | OUTPATIENT
Start: 2020-11-22 | End: 2020-11-22

## 2020-11-22 RX ADMIN — SODIUM CHLORIDE 1000 ML: 9 INJECTION, SOLUTION INTRAVENOUS at 18:20

## 2020-11-22 RX ADMIN — METOCLOPRAMIDE 10 MG: 5 INJECTION, SOLUTION INTRAMUSCULAR; INTRAVENOUS at 18:20

## 2020-11-22 RX ADMIN — KETOROLAC TROMETHAMINE 30 MG: 30 INJECTION, SOLUTION INTRAMUSCULAR at 18:20

## 2020-11-22 ASSESSMENT — PAIN SCALES - GENERAL
PAINLEVEL_OUTOF10: 9
PAINLEVEL_OUTOF10: 9

## 2020-11-22 ASSESSMENT — PAIN DESCRIPTION - LOCATION: LOCATION: ABDOMEN

## 2020-11-22 ASSESSMENT — PAIN DESCRIPTION - PAIN TYPE: TYPE: CHRONIC PAIN

## 2020-11-22 NOTE — ED PROVIDER NOTES
[facility]  Emergency Department Encounter  Emergency Medicine Resident     Pt Name: Padmini Mtz  MRN: [id number]  [place] 1965  Date of evaluation: 11/22/20  PCP:  Archana Savage PA-C        This patient was evaluated in the Emergency Department for symptoms described in the history of present illness. He/she was evaluated in the context of the global COVID-19 pandemic, which necessitated consideration that the patient might be at risk for infection with the SARS-CoV-2 virus that causes COVID-19. Institutional protocols and algorithms that pertain to the evaluation of patients at risk for COVID-19 are in a state of rapid change based on information released by regulatory bodies including the CDC and federal and state organizations. These policies and algorithms were followed during the patient's care in the ED. CHIEF COMPLAINT       Chief Complaint   Patient presents with    Flank Pain    Abdominal Pain    Nausea       HISTORY OF PRESENT ILLNESS  (Location/Symptom, Timing/Onset, Context/Setting, Quality, Duration, Modifying Factors, Severity.)    Padmini Mtz is a 48 y.o. female No obstetric history on file. No LMP recorded. Patient is postmenopausal. presents to Emergency Department with bilateral flank pain x2 days staying the same feels exactly like her previous kidney stones has been several months since her last kidney stone. There is associated nausea and diarrhea but no vomiting no black or bloody bowel movement. No abdominal pain or blood in the urine. Denies dysuria or frequency. Denies chest pain or shortness of breath. She has tried taking Tylenol with minimal relief. Has not seen a nephrologist for this in the past.        PAST MEDICAL / SURGICAL / SOCIAL / FAMILY HISTORY    has a past medical history of Asthma, CVA (cerebral vascular accident) ([address] Hypertension, and Kidney stone.      has a past surgical history that includes Cholecystectomy and postmenopausal.  Bilateral flank pain that is similar to her previous episodes of kidney stones, will provide analgesia antiemetics UA labs reassess. I have discussed risks and benefits obtain a CAT scan with patient and she has elected not to pursue CAT scan at this time unless there is significant abnormalities on work-up. EMERGENCY DEPARTMENT COURSE:  ED Course as of Nov 22 1912   Eleonobhanu Lowers Nov 22, 2020   1671 Patient reassessed she feels significantly improved no longer nauseous pain is improved. [RB]   1857 Creatinine is improved compared to prior calcium is stable. [RB]   1857 Lipase: 52 [RB]   1906 No UTI, No hematuria. [RB]      ED Course User Index  [RB] Marge Mckeon DO         PROCEDURES:        CONSULTS:  None    CRITICAL CARE:  Please see attending note    FINAL IMPRESSION     1. Flank pain         DISPOSITION / PLAN   DISPOSITION Decision To Discharge 11/22/2020 07:07:22 PM       Evaluation and treatment course in the ED, and plan of care upon discharge was discussed in length with the patient. Patient had no further questions prior to being discharged and was instructed to return to the ED for new or worsening symptoms. Any changes to existing medications or new prescriptions were reviewed with patient and they expressed understanding of how to correctly take their medications and the possible side effects. PATIENT REFERRED TO:  Jeannie Mackenzie PA-C  [address]  [phone]    Schedule an appointment as soon as possible for a visit in 2 days  Return to the ER if worsening or any other concern      DISCHARGE MEDICATIONS:  New Prescriptions    KETOROLAC (TORADOL) 10 MG TABLET    Take 1 tablet by mouth every 6 hours as needed for Pain    ONDANSETRON (ZOFRAN ODT) 4 MG DISINTEGRATING TABLET    Take 1 tablet by mouth every 8 hours as needed for Nausea       Dr. Ramila Lara.  [facility]  Emergency Medicine Resident Physician, PGY-3    (Please note that portions of this note were completed with a voice recognition program.  Efforts were made to edit the dictations but occasionally words are mis-transcribed.)         Betito Ambrose DO  Resident  11/22/20 [id number]

## 2020-11-22 NOTE — ED PROVIDER NOTES
[facility] ED     Emergency Department     Faculty Attestation        I performed a history and physical examination of the patient and discussed management with the resident. I reviewed the residents note and agree with the documented findings and plan of care. Any areas of disagreement are noted on the chart. I was personally present for the key portions of any procedures. I have documented in the chart those procedures where I was not present during the key portions. I have reviewed the emergency nurses triage note. I agree with the chief complaint, past medical history, past surgical history, allergies, medications, social and family history as documented unless otherwise noted below. For mid-level providers such as nurse practitioners as well as physicians assistants:    I have personally seen and evaluated the patient. I find the patient's history and physical exam are consistent with NP/PA documentation. I agree with the care provided, treatment rendered, disposition, & follow-up plan. Additional findings are as noted. Vital Signs: BP (!) 144/94   Pulse 84   Temp 97.2 °F (36.2 °C) (Temporal)   Resp 16   Ht 5' 5\" (1.651 m)   Wt 165 lb (74.8 kg)   SpO2 99%   BMI 27.46 kg/m²   PCP:  Dom Siegel PA-C    Pertinent Comments:     Patient with bilateral flank pain with some nausea and vomiting. She states she feels like she has a kidney stone she is otherwise afebrile nontoxic she has very mild bilateral CVA tenderness.       Critical Care  None          Graciela Kenny MD  Attending Emergency Medicine Physician              Nba Kirby MD  11/22/20 [id number]

## 2020-11-22 NOTE — ED TRIAGE NOTES
Pt arrived to the ED with c/o abdominal pain, nausea, flank pain. Pt state she has a hx of kidney stones and this feels like she may have another. Pt is alert and oriented x4. In waiting room no distress noted.

## 2020-11-24 NOTE — TELEPHONE ENCOUNTER
Health Maintenance   Topic Date Due    Breast cancer screen  07/03/2016    Shingles Vaccine (1 of 2) 07/03/2016    Cervical cancer screen  07/05/2020    Flu vaccine (1) 09/01/2020    Lipid screen  10/22/2021    Potassium monitoring  10/22/2021    Creatinine monitoring  10/22/2021    Diabetes screen  07/26/2022    Colon cancer screen colonoscopy  03/20/2023    DTaP/Tdap/Td vaccine (3 - Td) 04/12/2023    Hepatitis A vaccine  Aged Out    Hepatitis B vaccine  Aged Out    Hib vaccine  Aged Out    Meningococcal (ACWY) vaccine  Aged Out    Pneumococcal 0-64 years Vaccine  Aged Out    HIV screen  Discontinued             (applicable per patient's age: Cancer Screenings, Depression Screening, Fall Risk Screening, Immunizations)    Hemoglobin A1C (%)   Date Value   08/22/2018 4.8     LDL Cholesterol (mg/dL)   Date Value   10/22/2020 153 (H)     AST (U/L)   Date Value   11/22/2020 26     ALT (U/L)   Date Value   11/22/2020 23     BUN (mg/dL)   Date Value   11/22/2020 11      (goal A1C is < 7)   (goal LDL is <100) need 30-50% reduction from baseline     BP Readings from Last 3 Encounters:   11/22/20 (!) 144/94   05/21/20 (!) 142/96   04/24/20 119/83    (goal /80)      All Future Testing planned in [facility]:  Lab Frequency Next Occurrence   XR ABDOMEN (KUB) (SINGLE AP VIEW) Once 04/30/2020   XR WRIST LEFT (MIN 3 VIEWS) Once 08/17/2020   KARON DIGITAL SCREEN W OR WO CAD BILATERAL Once 01/01/2021       Next Visit Date:  Future Appointments   Date Time Provider Jose Rodriguez   12/3/2020  1:15 PM Fran Iyer MD sv gr lks [id number]   2/15/2021  9:20 AM Dheeraj Cleary PA-C [address]            Patient Active Problem List:     Calculus (=stone)     Essential hypertension     Asthma     Syncope and collapse     Ischemic stroke ([facility] [address]     Right sided weakness     Meralgia paresthetica of right side     Acute right hemiparesis (HCC)     Paresthesias     Numbness     Hypercholesteremia     Chest pain

## 2020-11-25 RX ORDER — PROMETHAZINE HYDROCHLORIDE 12.5 MG/1
TABLET ORAL
Qty: 30 TABLET | Refills: 0 | Status: SHIPPED | OUTPATIENT
Start: 2020-11-25 | End: 2021-01-22

## 2020-12-02 ENCOUNTER — TELEPHONE (OUTPATIENT)
Dept: GASTROENTEROLOGY | Age: 55
End: 2020-12-02

## 2020-12-03 ENCOUNTER — TELEPHONE (OUTPATIENT)
Dept: GASTROENTEROLOGY | Age: 55
End: 2020-12-03

## 2020-12-03 NOTE — TELEPHONE ENCOUNTER
Writer called patient at 36. No answer. LVM informing patient I would try calling her back to start her VV. If today does not work for her, she was advised to call office back to reschedule. Writer called patient at 80. No answer. LVM advising patient to call office to reschedule to a day/time that works better for her.

## 2020-12-03 NOTE — TELEPHONE ENCOUNTER
Patient left message stating that she missed her VV with Dr. Char Mustafa today and wanted to reschedule. Attempted to contact patient but there was no answer. Left message for patient to call the office to reschedule appointment.

## 2021-01-19 RX ORDER — AMLODIPINE BESYLATE 10 MG/1
TABLET ORAL
Qty: 90 TABLET | Refills: 0 | Status: SHIPPED | OUTPATIENT
Start: 2021-01-19 | End: 2021-03-19

## 2021-01-19 NOTE — TELEPHONE ENCOUNTER
Health Maintenance   Topic Date Due    Hepatitis C screen  1965    Pneumococcal 0-64 years Vaccine (1 of 1 - PPSV23) 07/03/1972    Breast cancer screen  07/03/2016    Shingles Vaccine (1 of 2) 07/03/2016    Cervical cancer screen  07/05/2020    Flu vaccine (1) 09/01/2020    Lipid screen  10/22/2021    Potassium monitoring  11/22/2021    Creatinine monitoring  11/22/2021    Diabetes screen  07/26/2022    Colon cancer screen colonoscopy  03/20/2023    DTaP/Tdap/Td vaccine (3 - Td) 04/12/2023    Hepatitis A vaccine  Aged Out    Hepatitis B vaccine  Aged Out    Hib vaccine  Aged Out    Meningococcal (ACWY) vaccine  Aged Out    HIV screen  Discontinued             (applicable per patient's age: Cancer Screenings, Depression Screening, Fall Risk Screening, Immunizations)    Hemoglobin A1C (%)   Date Value   08/22/2018 4.8     LDL Cholesterol (mg/dL)   Date Value   10/22/2020 153 (H)     AST (U/L)   Date Value   11/22/2020 26     ALT (U/L)   Date Value   11/22/2020 23     BUN (mg/dL)   Date Value   11/22/2020 11      (goal A1C is < 7)   (goal LDL is <100) need 30-50% reduction from baseline     BP Readings from Last 3 Encounters:   11/22/20 (!) 144/94   05/21/20 (!) 142/96   04/24/20 119/83    (goal /80)      All Future Testing planned in [facility]:  Lab Frequency Next Occurrence   XR WRIST LEFT (MIN 3 VIEWS) Once 08/17/2020   KARON DIGITAL SCREEN W OR WO CAD BILATERAL Once 03/15/2021       Next Visit Date:  Future Appointments   Date Time Provider Jose Rodriguez   2/15/2021  9:20 AM Oleg Mahmood PA-C [address]            Patient Active Problem List:     Calculus (=stone)     Essential hypertension     Asthma     Syncope and collapse     Ischemic stroke ([facility] [address]     Right sided weakness     Meralgia paresthetica of right side     Acute right hemiparesis (HCC)     Paresthesias     Numbness     Hypercholesteremia     Chest pain     Closed fracture of lower end of left radius with routine healing

## 2021-01-21 DIAGNOSIS — Z87.442 HISTORY OF KIDNEY STONES: ICD-10-CM

## 2021-01-21 DIAGNOSIS — R11.0 NAUSEA: ICD-10-CM

## 2021-01-22 RX ORDER — PROMETHAZINE HYDROCHLORIDE 12.5 MG/1
TABLET ORAL
Qty: 30 TABLET | Refills: 0 | Status: SHIPPED | OUTPATIENT
Start: 2021-01-22 | End: 2021-03-19

## 2021-01-22 NOTE — TELEPHONE ENCOUNTER
Health Maintenance   Topic Date Due    Hepatitis C screen  1965    Pneumococcal 0-64 years Vaccine (1 of 1 - PPSV23) 07/03/1972    Breast cancer screen  07/03/2016    Shingles Vaccine (1 of 2) 07/03/2016    Cervical cancer screen  07/05/2020    Flu vaccine (1) 09/01/2020    Lipid screen  10/22/2021    Potassium monitoring  11/22/2021    Creatinine monitoring  11/22/2021    Diabetes screen  07/26/2022    Colon cancer screen colonoscopy  03/20/2023    DTaP/Tdap/Td vaccine (3 - Td) 04/12/2023    Hepatitis A vaccine  Aged Out    Hepatitis B vaccine  Aged Out    Hib vaccine  Aged Out    Meningococcal (ACWY) vaccine  Aged Out    HIV screen  Discontinued             (applicable per patient's age: Cancer Screenings, Depression Screening, Fall Risk Screening, Immunizations)    Hemoglobin A1C (%)   Date Value   08/22/2018 4.8     LDL Cholesterol (mg/dL)   Date Value   10/22/2020 153 (H)     AST (U/L)   Date Value   11/22/2020 26     ALT (U/L)   Date Value   11/22/2020 23     BUN (mg/dL)   Date Value   11/22/2020 11      (goal A1C is < 7)   (goal LDL is <100) need 30-50% reduction from baseline     BP Readings from Last 3 Encounters:   11/22/20 (!) 144/94   05/21/20 (!) 142/96   04/24/20 119/83    (goal /80)      All Future Testing planned in [facility]:  Lab Frequency Next Occurrence   XR WRIST LEFT (MIN 3 VIEWS) Once 08/17/2020   KARON DIGITAL SCREEN W OR WO CAD BILATERAL Once 03/15/2021       Next Visit Date:  Future Appointments   Date Time Provider Jose Rodriguez   2/15/2021  9:20 AM Pat Miller PA-C [address]            Patient Active Problem List:     Calculus (=stone)     Essential hypertension     Asthma     Syncope and collapse     Ischemic stroke ([facility] [address]     Right sided weakness     Meralgia paresthetica of right side     Acute right hemiparesis (HCC)     Paresthesias     Numbness     Hypercholesteremia     Chest pain     Closed fracture of lower end of left radius with routine healing

## 2021-03-19 ENCOUNTER — TELEPHONE (OUTPATIENT)
Dept: PRIMARY CARE CLINIC | Age: 56
End: 2021-03-19

## 2021-03-19 DIAGNOSIS — Z76.0 ENCOUNTER FOR MEDICATION REFILL: ICD-10-CM

## 2021-03-22 RX ORDER — GLUCOSAMINE/CHONDR SU A SOD 750-600 MG
1 TABLET ORAL DAILY
Qty: 30 CAPSULE | Refills: 0 | Status: SHIPPED | OUTPATIENT
Start: 2021-03-22 | End: 2021-03-23 | Stop reason: DRUGHIGH

## 2021-03-23 ENCOUNTER — NURSE TRIAGE (OUTPATIENT)
Dept: OTHER | Facility: CLINIC | Age: 56
End: 2021-03-23

## 2021-03-23 ENCOUNTER — VIRTUAL VISIT (OUTPATIENT)
Dept: PRIMARY CARE CLINIC | Age: 56
End: 2021-03-23
Payer: MEDICARE

## 2021-03-23 DIAGNOSIS — A08.4 VIRAL GASTROENTERITIS: Primary | ICD-10-CM

## 2021-03-23 DIAGNOSIS — I10 ESSENTIAL HYPERTENSION: ICD-10-CM

## 2021-03-23 DIAGNOSIS — Z76.0 MEDICATION REFILL: ICD-10-CM

## 2021-03-23 DIAGNOSIS — E78.00 HIGH CHOLESTEROL: ICD-10-CM

## 2021-03-23 DIAGNOSIS — R51.9 ACUTE INTRACTABLE HEADACHE, UNSPECIFIED HEADACHE TYPE: ICD-10-CM

## 2021-03-23 PROCEDURE — G8427 DOCREV CUR MEDS BY ELIG CLIN: HCPCS | Performed by: PHYSICIAN ASSISTANT

## 2021-03-23 PROCEDURE — 99213 OFFICE O/P EST LOW 20 MIN: CPT | Performed by: PHYSICIAN ASSISTANT

## 2021-03-23 PROCEDURE — 3017F COLORECTAL CA SCREEN DOC REV: CPT | Performed by: PHYSICIAN ASSISTANT

## 2021-03-23 RX ORDER — AMLODIPINE BESYLATE 10 MG/1
10 TABLET ORAL DAILY
Qty: 30 TABLET | Refills: 1 | Status: SHIPPED | OUTPATIENT
Start: 2021-03-23 | End: 2021-09-21 | Stop reason: SDUPTHER

## 2021-03-23 RX ORDER — ATORVASTATIN CALCIUM 40 MG/1
40 TABLET, FILM COATED ORAL NIGHTLY
Qty: 30 TABLET | Refills: 2 | Status: SHIPPED | OUTPATIENT
Start: 2021-03-23 | End: 2021-05-11

## 2021-03-23 RX ORDER — GLUCOSAMINE/CHONDR SU A SOD 750-600 MG
1 TABLET ORAL DAILY
Qty: 30 CAPSULE | Refills: 2 | Status: SHIPPED | OUTPATIENT
Start: 2021-03-23 | End: 2021-06-17

## 2021-03-23 RX ORDER — SUMATRIPTAN 25 MG/1
12.5-25 TABLET, FILM COATED ORAL
Qty: 7 TABLET | Refills: 0 | Status: SHIPPED | OUTPATIENT
Start: 2021-03-23 | End: 2022-02-02

## 2021-03-23 RX ORDER — PROMETHAZINE HYDROCHLORIDE 12.5 MG/1
12.5 TABLET ORAL EVERY 6 HOURS PRN
COMMUNITY
End: 2021-05-03 | Stop reason: SDUPTHER

## 2021-03-23 SDOH — ECONOMIC STABILITY: TRANSPORTATION INSECURITY
IN THE PAST 12 MONTHS, HAS LACK OF TRANSPORTATION KEPT YOU FROM MEETINGS, WORK, OR FROM GETTING THINGS NEEDED FOR DAILY LIVING?: NO

## 2021-03-23 SDOH — ECONOMIC STABILITY: FOOD INSECURITY: WITHIN THE PAST 12 MONTHS, THE FOOD YOU BOUGHT JUST DIDN'T LAST AND YOU DIDN'T HAVE MONEY TO GET MORE.: NEVER TRUE

## 2021-03-23 SDOH — ECONOMIC STABILITY: INCOME INSECURITY: HOW HARD IS IT FOR YOU TO PAY FOR THE VERY BASICS LIKE FOOD, HOUSING, MEDICAL CARE, AND HEATING?: NOT HARD AT ALL

## 2021-03-23 ASSESSMENT — ENCOUNTER SYMPTOMS
VISUAL CHANGE: 0
HEMATOCHEZIA: 0
PHOTOPHOBIA: 1
ABDOMINAL PAIN: 0
VOMITING: 1
BLOATING: 0
NAUSEA: 1
BLOOD IN STOOL: 0
BLURRED VISION: 0
DIARRHEA: 1

## 2021-03-23 ASSESSMENT — PATIENT HEALTH QUESTIONNAIRE - PHQ9
2. FEELING DOWN, DEPRESSED OR HOPELESS: 0
SUM OF ALL RESPONSES TO PHQ QUESTIONS 1-9: 0
SUM OF ALL RESPONSES TO PHQ9 QUESTIONS 1 & 2: 0

## 2021-03-23 NOTE — PROGRESS NOTES
Pablo Carlisle is a 48 y.o. female evaluated virtual visit via [employer] video on 3/23/2021. Consent:  She and/or health care decision maker is aware that that she may receive a bill for this telephone service, depending on her insurance coverage, and has provided verbal consent to proceed: NA - Consent obtained within past 12 months      Documentation:  I communicated with the patient and/or health care decision maker about GI symptoms, headache. Details of this discussion including any medical advice provided below      I affirm this is a Patient Initiated Episode with an Established Patient who has not had a related appointment within my department in the past 7 days or scheduled within the next 24 hours. Total Time: minutes: 21-30 minutes    Note: not billable if this call serves to triage the patient into an appointment for the relevant concern      Pat Miller                  3/23/2021    TELEHEALTH EVALUATION -- Audio/Visual (During ONVOQ-67 public health emergency)    Patient and physician are located in their individual homes    HPI:    Pablo Carlisle (:  1965) has requested an audio only/video evaluation for the following concern(s):    Patient here for virtual visit via video complaining of GI symptoms, headache. GI Problem  The primary symptoms include fever, nausea, vomiting (no episode since Monday) and diarrhea (\"has stopped\"). Primary symptoms do not include abdominal pain, melena, hematochezia, dysuria or myalgias. The illness began 3 to 5 days ago. The onset was gradual. The problem has been gradually improving. The fever has been unchanged since its onset. The maximum temperature recorded prior to her arrival was 101 to 101.9 F. Nausea began 3 to 5 days ago. The vomiting began 2 days ago. Vomiting occurred once. The illness is also significant for chills. The illness does not include bloating, constipation or back pain.      Patient states \"had to receive flu shot for new physician she started, on last Thursday\", 3/18/2021. Patient does confirm \"daughter had similar symptoms, granddaughter starting to have similar symptoms. Patient confirms \"took promethazine and stopped\" the nausea. Instructed patient continue to take as needed. Informed patient will likely viral gastroenteritis, continue to eat light diet, patient voiced understanding. Headache   This is a new problem. The current episode started in the past 7 days. The problem occurs daily. The pain is located in the temporal region. The pain does not radiate. Associated symptoms include a fever, nausea, photophobia and vomiting (no episode since Monday). Pertinent negatives include no abdominal pain, back pain, blurred vision, coughing, dizziness, hearing loss, neck pain, phonophobia, tinnitus or visual change. She has tried NSAIDs for the symptoms. The treatment provided mild relief. Patient stated headache \"for the last 2 days\". Patient states \"took Advil kind of helped\". Instructed patient continue take Advil as needed. Informed patient she will be prescribed another medication to take as needed if Advil doesn't help, Imitrex, patient voiced understanding. Patient requesting additional medication refill for HLD, HTN, patient denies any side effect or complication to medication. Labs reviewed. Health maintenance reviewed. Medications reviewed and prescribed. Patient was informed that PCP will be residing in April 2021, encourage patient to get established with another provider in office or contact PCP office to be referred to another primary care patient, patient voiced understanding. HPI    Review of Systems   Constitutional: Positive for chills and fever. HENT: Negative for congestion, hearing loss and tinnitus. Eyes: Positive for photophobia. Negative for blurred vision. Respiratory: Negative for cough, shortness of breath and wheezing.     Cardiovascular: Negative for chest pain.   Gastrointestinal: Positive for diarrhea (\"has stopped\"), nausea and vomiting (no episode since Monday). Negative for abdominal pain, bloating, blood in stool, constipation, hematochezia and melena. Genitourinary: Negative for dysuria, frequency, hematuria and urgency. Musculoskeletal: Negative for back pain, myalgias and neck pain. Neurological: Positive for light-headedness and headaches. Negative for dizziness. Prior to Visit Medications    Medication Sig Taking? Authorizing Provider   promethazine (PHENERGAN) 12.5 MG tablet Take 12.5 mg by mouth every 6 hours as needed for Nausea Yes Historical Provider, MD   atorvastatin (LIPITOR) 40 MG tablet Take 1 tablet by mouth nightly Yes Kareem Ellison PA-C   Cholecalciferol (RA VITAMIN D-3) 50 MCG (2000 UT) CAPS Take 1 capsule by mouth daily Yes Kareem Ellison PA-C   SUMAtriptan (IMITREX) 25 MG tablet Take 0.5-1 tablets by mouth once as needed for Migraine Yes Kareem Ellison PA-C   amLODIPine (NORVASC) 10 MG tablet Take 1 tablet by mouth daily Yes Kareem Ellison PA-C   triamcinolone (KENALOG) 0.025 % ointment apply to affected area twice a day TO RASH FOR UP TO 2 WEEKS. Yes JESIKA Conner CNP   ciclopirox (PENLAC) 8 % solution Apply topically nightly. Remove once weekly with alcohol or nail polish remover.  Yes Ruddy Clemens DPM       Social History     Tobacco Use    Smoking status: Never Smoker    Smokeless tobacco: Never Used   Substance Use Topics    Alcohol use: No    Drug use: No        Past Medical History:   Diagnosis Date    Asthma     CVA [facility]cerebral vascular accident) ([facility] [address]     Hypertension     Kidney stone             CBC:  Lab Results   Component Value Date    WBC 4.4 11/22/2020    HGB 13.1 11/22/2020    PLT See Reflexed IPF Result 11/22/2020       BMP:    Lab Results   Component Value Date     11/22/2020    K 4.3 11/22/2020     11/22/2020    CO2 23 11/22/2020    BUN 11 11/22/2020    CREATININE 0.97 11/22/2020    GLUCOSE 103 11/22/2020       HEMOGLOBIN A1C:   Lab Results   Component Value Date    LABA1C 4.8 08/22/2018       FASTING LIPID PANEL:  Lab Results   Component Value Date    CHOL 251 (H) 10/22/2020    HDL 75 10/22/2020    TRIG 117 10/22/2020         RECORD REVIEW: Previous medical records were reviewed at today's visit. PHYSICAL EXAMINATION:    Vital Signs: (As obtained by patient/caregiver at home)    No flowsheet data found. Physical Exam  Constitutional:       General: She is awake. Appearance: Normal appearance. She is well-developed and well-groomed. HENT:      Head: Normocephalic and atraumatic. Right Ear: Hearing and external ear normal.      Left Ear: Hearing and external ear normal.      Nose: Nose normal.      Mouth/Throat:      Lips: Pink. Eyes:      General: Lids are normal.      Conjunctiva/sclera: Conjunctivae normal.   Neck:      Musculoskeletal: Neck supple. Pulmonary:      Effort: Pulmonary effort is normal.   Musculoskeletal:         General: No deformity or signs of injury. Neurological:      Mental Status: She is alert and oriented to person, place, and time. Psychiatric:         Attention and Perception: Attention and perception normal.         Mood and Affect: Mood normal.         Speech: Speech normal.         Behavior: Behavior is cooperative. Thought Content: Thought content normal.         Cognition and Memory: Cognition normal.         Judgment: Judgment normal.           Other pertinent observable physical exam findings:-     RECORD REVIEW: Previous medical records were reviewed at today's visit. The past family, medical and social histories were reviewed and unchanged with the exceptions of what is mentioned in this note. Due to this being a TeleHealth encounter, evaluation of the following organ systems is limited: Vitals/Constitutional/EENT/Resp/CV/GI//MS/Neuro/Skin/Heme-Lymph-Imm.     ASSESSMENT/PLAN:  Ras Hawk was seen today for other.    Diagnoses and all orders for this visit:    Viral gastroenteritis    Acute intractable headache, unspecified headache type  -     SUMAtriptan (IMITREX) 25 MG tablet; Take 0.5-1 tablets by mouth once as needed for Migraine    High cholesterol  -     atorvastatin (LIPITOR) 40 MG tablet; Take 1 tablet by mouth nightly    Essential hypertension  -     amLODIPine (NORVASC) 10 MG tablet; Take 1 tablet by mouth daily    Medication refill  -     atorvastatin (LIPITOR) 40 MG tablet; Take 1 tablet by mouth nightly  -     Cholecalciferol (RA VITAMIN D-3) 50 MCG (2000 UT) CAPS; Take 1 capsule by mouth daily  -     amLODIPine (NORVASC) 10 MG tablet; Take 1 tablet by mouth daily        Return if symptoms worsen or fail to improve. An  electronic signature was used to authenticate this note. --Hollie Dill PA-C on 3/27/2021 at 7:24 PM    This note is created with the assistance of a speech-recognition program. While intending to generate a document that actually reflects the content of the visit, the document can still have some mistakes which may not have been identified and corrected by editing. 9    Pursuant to the emergency declaration under the [facility] waiver authority and the [email or web address] General Act, this Virtual  Visit was conducted, with patient's consent, to reduce the patient's risk of exposure to COVID-19 and provide continuity of care for an established patient. Services were provided through a video synchronous discussion virtually to substitute for in-person clinic visit.

## 2021-03-23 NOTE — PATIENT INSTRUCTIONS
· Continue to take promethazine as needed for nausea  · Continue to take OTC Advil as needed for headache  ·  and take Imitrex as needed for any prolonged headache, start with a half a tablet and increase to a full tablet if needed

## 2021-03-27 ASSESSMENT — ENCOUNTER SYMPTOMS
WHEEZING: 0
SHORTNESS OF BREATH: 0
BACK PAIN: 0
CONSTIPATION: 0
COUGH: 0

## 2021-04-05 DIAGNOSIS — E78.00 HIGH CHOLESTEROL: ICD-10-CM

## 2021-04-05 RX ORDER — ASPIRIN 81 MG/1
TABLET, COATED ORAL
Qty: 90 TABLET | Refills: 0 | Status: SHIPPED | OUTPATIENT
Start: 2021-04-05 | End: 2022-02-02

## 2021-04-05 NOTE — TELEPHONE ENCOUNTER
Health Maintenance   Topic Date Due    Hepatitis C screen  Never done    Pneumococcal 0-64 years Vaccine (1 of 1 - PPSV23) Never done    COVID-19 Vaccine (1) Never done    Breast cancer screen  Never done    Cervical cancer screen  07/05/2020    Flu vaccine (Season Ended) 03/23/2022 (Originally 9/1/2021)    Shingles Vaccine (1 of 2) 03/23/2022 (Originally 7/3/2016)    Lipid screen  10/22/2021    Potassium monitoring  11/22/2021    Creatinine monitoring  11/22/2021    Diabetes screen  07/26/2022    Colon cancer screen colonoscopy  03/20/2023    DTaP/Tdap/Td vaccine (3 - Td) 04/12/2023    Hepatitis A vaccine  Aged Out    Hepatitis B vaccine  Aged Out    Hib vaccine  Aged Out    Meningococcal (ACWY) vaccine  Aged Out    HIV screen  Discontinued             (applicable per patient's age: Cancer Screenings, Depression Screening, Fall Risk Screening, Immunizations)    Hemoglobin A1C (%)   Date Value   08/22/2018 4.8     LDL Cholesterol (mg/dL)   Date Value   10/22/2020 153 (H)     AST (U/L)   Date Value   11/22/2020 26     ALT (U/L)   Date Value   11/22/2020 23     BUN (mg/dL)   Date Value   11/22/2020 11      (goal A1C is < 7)   (goal LDL is <100) need 30-50% reduction from baseline     BP Readings from Last 3 Encounters:   11/22/20 (!) 144/94   05/21/20 (!) 142/96   04/24/20 119/83    (goal /80)      All Future Testing planned in [facility]:  Lab Frequency Next Occurrence   XR WRIST LEFT (MIN 3 VIEWS) Once 08/17/2020       Next Visit Date:  No future appointments.          Patient Active Problem List:     Calculus (=stone)     Essential hypertension     Asthma     Syncope and collapse     Ischemic stroke (HCC)     Right sided weakness     Meralgia paresthetica of right side     Acute right hemiparesis (HCC)     Paresthesias     Numbness     Hypercholesteremia     Chest pain     Closed fracture of lower end of left radius with routine healing

## 2021-05-03 NOTE — TELEPHONE ENCOUNTER
Patient requesting refill, advised she needed to choose new pcp, her last appt was with Stacey Ambriz 3/23/21.  She is seeing you 6/25/21

## 2021-05-11 RX ORDER — ATORVASTATIN CALCIUM 40 MG/1
40 TABLET, FILM COATED ORAL DAILY
Qty: 30 TABLET | Refills: 0 | Status: SHIPPED | OUTPATIENT
Start: 2021-05-11 | End: 2021-09-23

## 2021-05-11 RX ORDER — PROMETHAZINE HYDROCHLORIDE 12.5 MG/1
12.5 TABLET ORAL EVERY 6 HOURS PRN
Qty: 30 TABLET | Refills: 2 | Status: SHIPPED | OUTPATIENT
Start: 2021-05-11 | End: 2021-12-30 | Stop reason: SDUPTHER

## 2021-05-19 ENCOUNTER — TELEPHONE (OUTPATIENT)
Dept: PRIMARY CARE CLINIC | Age: 56
End: 2021-05-19

## 2021-06-03 ENCOUNTER — OFFICE VISIT (OUTPATIENT)
Dept: PRIMARY CARE CLINIC | Age: 56
End: 2021-06-03

## 2021-06-03 VITALS
SYSTOLIC BLOOD PRESSURE: 130 MMHG | TEMPERATURE: 97.7 F | HEART RATE: 77 BPM | OXYGEN SATURATION: 100 % | BODY MASS INDEX: 27.96 KG/M2 | WEIGHT: 168 LBS | DIASTOLIC BLOOD PRESSURE: 87 MMHG

## 2021-06-03 DIAGNOSIS — I63.9 ISCHEMIC STROKE (HCC): ICD-10-CM

## 2021-06-03 DIAGNOSIS — Z76.89 ESTABLISHING CARE WITH NEW DOCTOR, ENCOUNTER FOR: Primary | ICD-10-CM

## 2021-06-03 DIAGNOSIS — I10 ESSENTIAL HYPERTENSION: ICD-10-CM

## 2021-06-03 DIAGNOSIS — Z11.1 ENCOUNTER FOR PPD TEST: ICD-10-CM

## 2021-06-03 PROCEDURE — 99213 OFFICE O/P EST LOW 20 MIN: CPT | Performed by: PHYSICIAN ASSISTANT

## 2021-06-03 ASSESSMENT — ENCOUNTER SYMPTOMS
CONSTIPATION: 0
VOMITING: 0
NAUSEA: 0
ABDOMINAL PAIN: 0
COUGH: 0
DIARRHEA: 0
EYE PAIN: 0
SHORTNESS OF BREATH: 0
EYE REDNESS: 0
RHINORRHEA: 0
COLOR CHANGE: 0

## 2021-06-03 NOTE — PROGRESS NOTES
Visit Information    Have you changed or started any medications since your last visit including any over-the-counter medicines, vitamins, or herbal medicines? no   Are you having any side effects from any of your medications? -  no  Have you stopped taking any of your medications? Is so, why? -  no    Have you seen any other physician or provider since your last visit? No  Have you had any other diagnostic tests since your last visit? No  Have you been seen in the emergency room and/or had an admission to a hospital since we last saw you? No  Have you had your routine dental cleaning in the past 6 months? no    Have you activated your [facility] account? If not, what are your barriers?  Yes     Patient Care Team:  Lalito Villalpando PA-C as PCP - General (Physician Assistant)  Patti Rojas PA-C as PCP - [facility]  Kirstin Doan DPM as Physician (Podiatry)  Elizabeth Conroy MD as Consulting Physician (Gastroenterology)    Medical History Review  Past Medical, Family, and Social History reviewed and does not contribute to the patient presenting condition    Health Maintenance   Topic Date Due    Hepatitis C screen  Never done    Pneumococcal 0-64 years Vaccine (1 of 2 - PPSV23) Never done    COVID-19 Vaccine (1) Never done    Breast cancer screen  Never done    Cervical cancer screen  07/05/2020    Flu vaccine (Season Ended) 03/23/2022 (Originally 9/1/2021)    Shingles Vaccine (1 of 2) 03/23/2022 (Originally 7/3/2016)    Lipid screen  10/22/2021    Potassium monitoring  11/22/2021    Creatinine monitoring  11/22/2021    Diabetes screen  07/26/2022    Colon cancer screen colonoscopy  03/20/2023    DTaP/Tdap/Td vaccine (3 - Td or Tdap) 04/12/2023    Hepatitis A vaccine  Aged Out    Hepatitis B vaccine  Aged Out    Hib vaccine  Aged Out    Meningococcal (ACWY) vaccine  Aged Out    HIV screen  Discontinued

## 2021-06-03 NOTE — PROGRESS NOTES
[facility]  [phone] [address]  Dept: [phone]  Dept Fax: [phone]    New Patient Visit Note  Date of patient's visit: 6/3/2021  Patient's Name:  Anival Gabriel YOB: 1965            Silvia De La Rosa PA-C  ______________________________________________________________________    Reason for visit: Establish care/Preventative care    Anival Gabriel is a 48 y.o. female who is a New patient, who presents   today for her medical conditions/complaints as noted below:  Chief Complaint   Patient presents with    New Patient     establish care    Other     work physical and PPD     NTP   ______________________________________________________________________  History of Presenting Illness:  History was obtained from the patient. Anival Gabriel is a 48 y.o. is here to establish care. Presents for annual work physical and for PPD testing. Reports her chronic conditions such as HTN are stable, she takes all her medications as prescribed. Had a stroke years ago, recovered uneventfully.  Currently denies any CP, SOB, abd pain, headaches, blurry or double vision, no recent fevers or chills, no other associated symptoms or complaints.     ______________________________________________________________________  Past Medical/Surgical History:        Diagnosis Date    Asthma     CVA (cerebral vascular accident) ([address]     Hypertension     Kidney stone            Procedure Laterality Date    CHOLECYSTECTOMY      LITHOTRIPSY         ______________________________________________________________________  Health Maintenance Due   Topic Date Due    Hepatitis C screen  Never done    Pneumococcal 0-64 years Vaccine (1 of 2 - PPSV23) Never done    COVID-19 Vaccine (1) Never done    Breast cancer screen  Never done    Cervical cancer screen  07/05/2020       Allergies   Allergen Reactions    Sulfa Antibiotics Itching         Current Outpatient Medications   Medication Sig Dispense Refill    promethazine (PHENERGAN) 12.5 MG tablet Take 1 tablet by mouth every 6 hours as needed for Nausea 30 tablet 2    atorvastatin (LIPITOR) 40 MG tablet Take 1 tablet by mouth daily 30 tablet 0    Cholecalciferol (RA VITAMIN D-3) 50 MCG (2000 UT) CAPS Take 1 capsule by mouth daily 30 capsule 2    amLODIPine (NORVASC) 10 MG tablet Take 1 tablet by mouth daily 30 tablet 1    ciclopirox (PENLAC) 8 % solution Apply topically nightly. Remove once weekly with alcohol or nail polish remover. 1 Bottle 3    ASPIRIN LOW DOSE 81 MG EC tablet take 1 tablet by mouth once daily (Patient not taking: Reported on 6/3/2021) 90 tablet 0    SUMAtriptan (IMITREX) 25 MG tablet Take 0.5-1 tablets by mouth once as needed for Migraine (Patient not taking: Reported on 6/3/2021) 7 tablet 0    triamcinolone (KENALOG) 0.025 % ointment apply to affected area twice a day TO RASH FOR UP TO 2 WEEKS. (Patient not taking: Reported on 6/3/2021) 15 g 0     No current facility-administered medications for this visit. Social History     Tobacco Use    Smoking status: Never Smoker    Smokeless tobacco: Never Used   Substance Use Topics    Alcohol use: No    Drug use: No       No family history on file.   ______________________________________________________________________  Review of Systems   Constitutional: Negative for chills, fatigue and fever. HENT: Negative for congestion, ear pain and rhinorrhea. Eyes: Negative for pain and redness. Respiratory: Negative for cough and shortness of breath. Cardiovascular: Negative for chest pain. Gastrointestinal: Negative for abdominal pain, constipation, diarrhea, nausea and vomiting. Musculoskeletal: Negative for neck pain. Skin: Negative for color change and rash. Neurological: Negative for dizziness, weakness, light-headedness, numbness and headaches. ______________________________________________________________________  Physical Exam  Vitals and nursing note reviewed. Constitutional:       Appearance: Normal appearance. HENT:      Head: Normocephalic and atraumatic. Nose: Nose normal. No congestion. Mouth/Throat:      Mouth: Mucous membranes are moist.   Eyes:      Extraocular Movements: Extraocular movements intact. Pupils: Pupils are equal, round, and reactive to light. Cardiovascular:      Rate and Rhythm: Normal rate and regular rhythm. Pulses: Normal pulses. Heart sounds: Normal heart sounds. Pulmonary:      Effort: Pulmonary effort is normal.      Breath sounds: Normal breath sounds. Abdominal:      General: Abdomen is flat. Palpations: Abdomen is soft. Tenderness: There is no abdominal tenderness. Musculoskeletal:         General: Normal range of motion. Cervical back: Normal range of motion and neck supple. No tenderness. Lymphadenopathy:      Cervical: No cervical adenopathy. Skin:     General: Skin is warm and dry. Capillary Refill: Capillary refill takes less than 2 seconds. Neurological:      General: No focal deficit present. Mental Status: She is alert and oriented to person, place, and time. Sensory: No sensory deficit. Motor: No weakness.    Psychiatric:         Mood and Affect: Mood normal.         Vitals:    06/03/21 0901   BP: (!) 128/90   Site: Left Upper Arm   Position: Sitting   Cuff Size: Medium Adult   Pulse: 77   Temp: 97.7 °F (36.5 °C)   TempSrc: Temporal   SpO2: 100%   Weight: 168 lb (76.2 kg)     BP Readings from Last 3 Encounters:   06/03/21 (!) 128/90   11/22/20 (!) 144/94   05/21/20 (!) 142/96          ______________________________________________________________________  Diagnostic findings:  CBC:  Lab Results   Component Value Date    WBC 4.4 11/22/2020    HGB 13.1 11/22/2020    PLT See Reflexed IPF Result 11/22/2020       BMP:    Lab Results Component Value Date     11/22/2020    K 4.3 11/22/2020     11/22/2020    CO2 23 11/22/2020    BUN 11 11/22/2020    CREATININE 0.97 11/22/2020    GLUCOSE 103 11/22/2020       HEMOGLOBIN A1C:   Lab Results   Component Value Date    LABA1C 4.8 08/22/2018       FASTING LIPID PANEL:  Lab Results   Component Value Date    CHOL 251 (H) 10/22/2020    HDL 75 10/22/2020    TRIG 117 10/22/2020       No results found for this visit on 06/03/21.    ______________________________________________________________________  Assessment and Plan:  Meagan Barrera was seen today for new patient and other. Diagnoses and all orders for this visit:    Establishing care with new doctor, encounter for    Essential hypertension  -     CBC Auto Differential; Future  -     Comprehensive Metabolic Panel; Future  -     Lipid, Fasting; Future    Ischemic stroke ([facility] [address]    Encounter for PPD test  -     Mantoux testing    Ordered baseline blood work for annual evaluation. Order for mantoux testing placed, stressed importance that it must be read w/i 48-72 hours or the series will need to be repeated and that options to have it read on the weekends are limited. Patient verbalized understanding to this. Follow up will be in six months.     ______________________________________________________________________  Follow up and instructions:  Return in about 6 months (around 12/3/2021). · Discussed use, benefit, and side effects of prescribed medications. Barriers to medication compliance addressed. All patient questions answered. Pt voiced understanding. · Patient given educational materials - see patient instructions    · Patient instructed to call the office or go directly to the ER for any worsening problems or concerns.  Patient voiced understanding    Nemesio Mckenna PA-C  [address] [place] [address]  6/3/2021, 9:17 AM    This note is created with the assistance of a speech-recognition program. While intending to generate a document that actually reflects the content of the visit, the document can still have some mistakes which may not have been identified and corrected by editing.

## 2021-06-05 ENCOUNTER — NURSE ONLY (OUTPATIENT)
Dept: FAMILY MEDICINE CLINIC | Age: 56
End: 2021-06-05

## 2021-06-05 DIAGNOSIS — Z11.1 ENCOUNTER FOR PPD SKIN TEST READING: Primary | ICD-10-CM

## 2021-06-05 PROCEDURE — 99999 PR OFFICE/OUTPT VISIT,PROCEDURE ONLY: CPT | Performed by: NURSE PRACTITIONER

## 2021-06-05 NOTE — PROGRESS NOTES
PPD Reading Note  PPD read and results entered in [facility]. Result: 0 mm induration. Interpretation: NEGATIVE  If test not read within 48-72 hours of initial placement, patient advised to repeat in other arm 1-3 weeks after this test.  Allergic reaction: no  Paperwork scanned into Epic. Pt needs 2- step Mantoux. Advised she can return in 7-21 days to complete second step.

## 2021-06-07 PROCEDURE — 86580 TB INTRADERMAL TEST: CPT | Performed by: PHYSICIAN ASSISTANT

## 2021-06-14 ENCOUNTER — NURSE ONLY (OUTPATIENT)
Dept: PRIMARY CARE CLINIC | Age: 56
End: 2021-06-14

## 2021-06-14 DIAGNOSIS — Z11.1 ENCOUNTER FOR PPD TEST: Primary | ICD-10-CM

## 2021-06-14 PROCEDURE — 86580 TB INTRADERMAL TEST: CPT | Performed by: PHYSICIAN ASSISTANT

## 2021-06-16 ENCOUNTER — NURSE ONLY (OUTPATIENT)
Dept: PRIMARY CARE CLINIC | Age: 56
End: 2021-06-16

## 2021-06-16 DIAGNOSIS — Z76.0 MEDICATION REFILL: ICD-10-CM

## 2021-06-16 DIAGNOSIS — Z11.1 ENCOUNTER FOR PPD SKIN TEST READING: Primary | ICD-10-CM

## 2021-06-16 NOTE — TELEPHONE ENCOUNTER
Hemoglobin A1C (%)   Date Value   08/22/2018 4.8             ( goal A1C is < 7)   No results found for: LABMICR  LDL Cholesterol (mg/dL)   Date Value   10/22/2020 153 (H)       (goal LDL is <100)   AST (U/L)   Date Value   11/22/2020 26     ALT (U/L)   Date Value   11/22/2020 23     BUN (mg/dL)   Date Value   11/22/2020 11     BP Readings from Last 3 Encounters:   06/03/21 130/87   11/22/20 (!) 144/94   05/21/20 (!) 142/96          (goal 120/80)        Patient Active Problem List:     Calculus (=stone)     Essential hypertension     Asthma     Syncope and collapse     Ischemic stroke ([facility] [address]     Right sided weakness     Meralgia paresthetica of right side     Acute right hemiparesis (HCC)     Paresthesias     Numbness     Hypercholesteremia     Chest pain     Closed fracture of lower end of left radius with routine healing     Establishing care with new doctor, encounter for     Encounter for PPD test      ----Dayan De La Torre

## 2021-06-17 RX ORDER — ACETAMINOPHEN 160 MG
TABLET,DISINTEGRATING ORAL
Qty: 30 CAPSULE | Refills: 2 | Status: SHIPPED | OUTPATIENT
Start: 2021-06-17 | End: 2021-10-07 | Stop reason: SDUPTHER

## 2021-07-03 ENCOUNTER — HOSPITAL ENCOUNTER (EMERGENCY)
Age: 56
Discharge: HOME | End: 2021-07-03
Attending: EMERGENCY MEDICINE
Payer: COMMERCIAL

## 2021-07-03 VITALS
HEART RATE: 73 BPM | OXYGEN SATURATION: 100 % | DIASTOLIC BLOOD PRESSURE: 91 MMHG | HEIGHT: 65 IN | TEMPERATURE: 97 F | BODY MASS INDEX: 29.16 KG/M2 | RESPIRATION RATE: 18 BRPM | SYSTOLIC BLOOD PRESSURE: 141 MMHG | WEIGHT: 175 LBS

## 2021-07-03 DIAGNOSIS — S39.012A STRAIN OF LUMBAR REGION, INITIAL ENCOUNTER: Primary | ICD-10-CM

## 2021-07-03 PROBLEM — Z11.1 ENCOUNTER FOR PPD TEST: Status: RESOLVED | Noted: 2021-06-03 | Resolved: 2021-07-03

## 2021-07-03 PROCEDURE — 6360000002 HC RX W HCPCS: Performed by: STUDENT IN AN ORGANIZED HEALTH CARE EDUCATION/TRAINING PROGRAM

## 2021-07-03 PROCEDURE — 96372 THER/PROPH/DIAG INJ SC/IM: CPT

## 2021-07-03 PROCEDURE — 99282 EMERGENCY DEPT VISIT SF MDM: CPT

## 2021-07-03 RX ORDER — CYCLOBENZAPRINE HCL 5 MG
5 TABLET ORAL 2 TIMES DAILY PRN
Qty: 20 TABLET | Refills: 0 | Status: SHIPPED | OUTPATIENT
Start: 2021-07-03 | End: 2021-07-13

## 2021-07-03 RX ORDER — LIDOCAINE 4 G/G
1 PATCH TOPICAL DAILY
Qty: 10 PATCH | Refills: 0 | Status: SHIPPED | OUTPATIENT
Start: 2021-07-03 | End: 2021-07-13

## 2021-07-03 RX ORDER — ACETAMINOPHEN 325 MG/1
650 TABLET ORAL EVERY 6 HOURS PRN
Qty: 60 TABLET | Refills: 0 | Status: SHIPPED | OUTPATIENT
Start: 2021-07-03 | End: 2022-02-02

## 2021-07-03 RX ORDER — IBUPROFEN 200 MG
400 TABLET ORAL EVERY 6 HOURS PRN
Qty: 60 TABLET | Refills: 0 | Status: SHIPPED | OUTPATIENT
Start: 2021-07-03 | End: 2022-02-02

## 2021-07-03 RX ORDER — KETOROLAC TROMETHAMINE 15 MG/ML
15 INJECTION, SOLUTION INTRAMUSCULAR; INTRAVENOUS ONCE
Status: COMPLETED | OUTPATIENT
Start: 2021-07-03 | End: 2021-07-03

## 2021-07-03 RX ADMIN — KETOROLAC TROMETHAMINE 15 MG: 15 INJECTION, SOLUTION INTRAMUSCULAR; INTRAVENOUS at 11:29

## 2021-07-03 ASSESSMENT — PAIN SCALES - GENERAL
PAINLEVEL_OUTOF10: 7
PAINLEVEL_OUTOF10: 7

## 2021-07-03 NOTE — ED PROVIDER NOTES
[facility] ED  Emergency Department Encounter  EmergencyMedicine Resident     Pt Name:Bushra Urena  MRN: [id number]  [place] 1965  Date of evaluation: 7/3/21  PCP:  Mabel Alves PA-C    This patient was evaluated in the Emergency Department for symptoms described in the history of present illness. The patient was evaluated in the context of the global COVID-19 pandemic, which necessitated consideration that the patient might be at risk for infection with the SARS-CoV-2 virus that causes COVID-19. Institutional protocols and algorithms that pertain to the evaluation of patients at risk for COVID-19 are in a state of rapid change based on information released by regulatory bodies including the CDC and federal and state organizations. These policies and algorithms were followed during the patient's care in the ED. CHIEF COMPLAINT       Chief Complaint   Patient presents with    Back Pain     lower lumbar strain while at work today, denies radiation       HISTORY OF PRESENT ILLNESS  (Location/Symptom, Timing/Onset, Context/Setting, Quality, Duration, Modifying Factors, Severity.)      Jenna Murray is a 55 y.o. female who presents with back pain. Patient presents with low back pain, started after lifting a patient at work, located paraspinal muscles of lumbar region, sharp, worse with certain movements, better with rest, no associated symptoms, constant with intermittently worsening. Patient denies fevers, chills, cough, congestion, chest pain, shortness of breath, abdominal pain, nausea, vomiting, bowel or bladder incontinence or retention, paresthesias. Patient has not take anything prior to arrival, came to the emergency department for evaluation straight from work. PAST MEDICAL / SURGICAL / SOCIAL / FAMILY HISTORY      has a past medical history of Asthma, CVA (cerebral vascular accident) ([address] Hypertension, and Kidney stone.        has a past surgical history that includes Cholecystectomy and Lithotripsy. Social History     Socioeconomic History    Marital status: Single     Spouse name: Not on file    Number of children: Not on file    Years of education: Not on file    Highest education level: Not on file   Occupational History    Not on file   Tobacco Use    Smoking status: Never Smoker    Smokeless tobacco: Never Used   Substance and Sexual Activity    Alcohol use: No    Drug use: No    Sexual activity: Not on file   Other Topics Concern    Not on file   Social History Narrative    Not on file     Social Determinants of Health     Financial Resource Strain: Low Risk     Difficulty of Paying Living Expenses: Not hard at all   Food Insecurity: No Food Insecurity    Worried About [facility] in the Last Year: Never true    Mitch of Food in the Last Year: Never true   Transportation Needs: No Transportation Needs    Lack of Transportation (Medical): No    Lack of Transportation (Non-Medical): No   Physical Activity:     Days of Exercise per Week:     Minutes of Exercise per Session:    Stress:     Feeling of Stress :    Social Connections:     Frequency of Communication with Friends and Family:     Frequency of Social Gatherings with Friends and Family:     Attends Shinto Services:     Active Member of Clubs or Organizations:     Attends Club or Organization Meetings:     Marital Status:    Intimate Partner Violence:     Fear of Current or Ex-Partner:     Emotionally Abused:     Physically Abused:     Sexually Abused:        History reviewed. No pertinent family history. Allergies:  Sulfa antibiotics    Home Medications:  Prior to Admission medications    Medication Sig Start Date End Date Taking?  Authorizing Provider   lidocaine 4 % external patch Place 1 patch onto the skin daily for 10 days 7/3/21 7/13/21 Yes Maria Dolores Shepherd MD   acetaminophen (TYLENOL) 325 MG tablet Take 2 tablets by mouth every 6 hours as needed for Pain 7/3/21  Yes Tone Gallardo MD   ibuprofen (ADVIL;MOTRIN) 200 MG tablet Take 2 tablets by mouth every 6 hours as needed for Pain or Fever 7/3/21  Yes Tone Gallardo MD   cyclobenzaprine (FLEXERIL) 5 MG tablet Take 1 tablet by mouth 2 times daily as needed for Muscle spasms 7/3/21 7/13/21 Yes Tone Gallardo MD   Cholecalciferol (VITAMIN D3) 50 MCG (2000 UT) CAPS take 1 capsule by mouth once daily 6/17/21   Chandu Mojica PA-C   promethazine (PHENERGAN) 12.5 MG tablet Take 1 tablet by mouth every 6 hours as needed for Nausea 5/11/21   JESIKA Jacinto CNP   atorvastatin (LIPITOR) 40 MG tablet Take 1 tablet by mouth daily 5/11/21   JESIKA Jacinto CNP   ASPIRIN LOW DOSE 81 MG EC tablet take 1 tablet by mouth once daily  Patient not taking: Reported on 6/3/2021 4/5/21   Cesilia Martin PA-C   SUMAtriptan (IMITREX) 25 MG tablet Take 0.5-1 tablets by mouth once as needed for Migraine  Patient not taking: Reported on 6/3/2021 3/23/21 3/23/21  Cesilia Martin PA-C   amLODIPine (NORVASC) 10 MG tablet Take 1 tablet by mouth daily 3/23/21   Cesilia Martin PA-C   triamcinolone (KENALOG) 0.025 % ointment apply to affected area twice a day TO RASH FOR UP TO 2 WEEKS. Patient not taking: Reported on 6/3/2021 10/10/19   JEISKA Alanis CNP   ciclopirox [facility]) 8 % solution Apply topically nightly. Remove once weekly with alcohol or nail polish remover. 8/5/19   Brenda Guillaume DPM       REVIEW OF SYSTEMS    (2-9 systems for level 4, 10 or more for level 5)      Review of Systems   Positive for low back pain. Patient denies fevers, chills, cough, congestion, chest pain, shortness of breath, abdominal pain, nausea, vomiting, bowel or bladder incontinence or retention, paresthesias.     PHYSICAL EXAM   (up to 7 for level 4, 8 or more for level 5)      INITIAL VITALS:   BP (!) 141/91   Pulse 73   Temp 97 °F (36.1 °C) (Tympanic)   Resp 18   Ht 5' 5\" (1.651 m)   Wt 175 lb (79.4 kg)   SpO2 100%   BMI 29.12 kg/m²     Physical Exam   Patient is alert oriented, openly communicative, no acute distress, nontoxic-appearing, speaking full sentences, does not appear to be in a significant amount of pain, head is atraumatic, EOMI, CTAB, regular rate and rhythm, no extra heart sounds, abdomen soft, nondistended, nontender, low back pain paralumbar, no lumbar tenderness, full range of motion, strength 5/5 in all extremities, sensation to light touch intact in all extremities, patient able to walk without difficulty. DIFFERENTIAL  DIAGNOSIS     PLAN (LABS / IMAGING / EKG):  No orders of the defined types were placed in this encounter. MEDICATIONS ORDERED:  Orders Placed This Encounter   Medications    ketorolac (TORADOL) injection 15 mg    lidocaine 4 % external patch     Sig: Place 1 patch onto the skin daily for 10 days     Dispense:  10 patch     Refill:  0    acetaminophen (TYLENOL) 325 MG tablet     Sig: Take 2 tablets by mouth every 6 hours as needed for Pain     Dispense:  60 tablet     Refill:  0    ibuprofen (ADVIL;MOTRIN) 200 MG tablet     Sig: Take 2 tablets by mouth every 6 hours as needed for Pain or Fever     Dispense:  60 tablet     Refill:  0    cyclobenzaprine (FLEXERIL) 5 MG tablet     Sig: Take 1 tablet by mouth 2 times daily as needed for Muscle spasms     Dispense:  20 tablet     Refill:  0       DDX:     DIAGNOSTIC RESULTS / EMERGENCY DEPARTMENT COURSE / MDM   LAB RESULTS:  No results found for this visit on 07/03/21. IMPRESSION: 61-year-old female with pain in the low back after lifting a patient, physical exam and HPI consistent with low back strain, no lumbar tenderness, no red flags, no systemic symptoms, no indication for labs or imaging at this time.     RADIOLOGY:      EKG      All EKG's are interpreted by the Emergency Department Physician who either signs or Co-signs this chart in the absence of a cardiologist.    EMERGENCY DEPARTMENT COURSE:  Patient came to emergency department, HPI and physical exam were conducted. All nursing notes were reviewed. Patient remained stable emergency department with normal vital signs. Gave strict return precautions to the emergency department and discharge patient home. Recommended patient follow-up with [facility] and PCP for follow-up. Prescribed Tylenol, ibuprofen, Flexeril, lidocaine for symptomatic management. PROCEDURES:      CONSULTS:  None    CRITICAL CARE:      FINAL IMPRESSION      1.  Strain of lumbar region, initial encounter          DISPOSITION / PLAN     DISPOSITION Decision To Discharge 07/03/2021 11:25:21 AM      PATIENT REFERRED TO:  [facility]  [address] [facility]  [phone]  Go to   As needed, If symptoms worsen    Brian Camarillo PA-C  [address]  [address]  [phone]    Schedule an appointment as soon as possible for a visit in 1 week  For reassessment      DISCHARGE MEDICATIONS:  Discharge Medication List as of 7/3/2021 11:31 AM      START taking these medications    Details   lidocaine 4 % external patch Place 1 patch onto the skin daily for 10 days, Transdermal, DAILY Starting Sat 7/3/2021, Until Tue 7/13/2021, For 10 days, Disp-10 patch, R-0, Print      acetaminophen (TYLENOL) 325 MG tablet Take 2 tablets by mouth every 6 hours as needed for Pain, Disp-60 tablet, R-0Print      ibuprofen (ADVIL;MOTRIN) 200 MG tablet Take 2 tablets by mouth every 6 hours as needed for Pain or Fever, Disp-60 tablet, R-0Print      cyclobenzaprine (FLEXERIL) 5 MG tablet Take 1 tablet by mouth 2 times daily as needed for Muscle spasms, Disp-20 tablet, R-0Print             Kerri Reed MD  Emergency Medicine Resident    (Please note that portions of thisnote were completed with a voice recognition program.  Efforts were made to edit the dictations but occasionally words are mis-transcribed.)       Kerri Reed MD  Resident  07/03/21 [id number]

## 2021-07-03 NOTE — ED PROVIDER NOTES
[facility]  ED  eMERGENCY dEPARTMENT eNCOUnter   Attending Attestation     Pt Name: Beatriz Crocker  MRN: [id number]  [place] 1965  Date of evaluation: 7/3/21       Beatriz Crocker is a 55 y.o. female who presents with Back Pain (lower lumbar strain while at work today, denies radiation)      History: Pt presents with lower back pain that started this am while moving a patient. Pt heard no pop or crack in her back. Pt has no numbness weakness, or tingling and has no bowel or bladder complaints. Exam: Pt has bilateral lower back tenderness. Plan for pain control and discharge      I performed a history and physical examination of the patient and discussed management with the resident. I reviewed the residents note and agree with the documented findings and plan of care. Any areas of disagreement are noted on the chart. I was personally present for the key portions of any procedures. I have documented in the chart those procedures where I was not present during the key portions. I have personally reviewed all images and agree with the resident's interpretation. I have reviewed the emergency nurses triage note. I agree with the chief complaint, past medical history, past surgical history, allergies, medications, social and family history as documented unless otherwise noted below. Documentation of the HPI, Physical Exam and Medical Decision Making performed by medical students or scribes is based on my personal performance of the HPI, PE and MDM. For Phys Assistant/ Nurse Practitioner cases/documentation I have had a face to face evaluation of this patient and have completed at least one if not all key elements of the E/M (history, physical exam, and MDM). Additional findings are as noted. For APC cases I have personally evaluated and examined the patient in conjunction with the APC and agree with the treatment plan and disposition of the patient as recorded by the APC.     Breann Cerda, MD  Attending Emergency  Physician       Tray Bullock MD  07/27/21 [id number]

## 2021-07-27 NOTE — TELEPHONE ENCOUNTER
Called pt to reschedule for a 3rd time at [facility]. Pt is requesting a morning appt and when caller went to adv of first available call was lost. Called pt back and lvm to return call and reschedule.  First available morning isn't until 9/24/2021 due to adjustments being made schedule for August.

## 2021-09-20 DIAGNOSIS — I10 ESSENTIAL HYPERTENSION: ICD-10-CM

## 2021-09-20 DIAGNOSIS — Z76.0 MEDICATION REFILL: ICD-10-CM

## 2021-09-21 RX ORDER — AMLODIPINE BESYLATE 10 MG/1
10 TABLET ORAL DAILY
Qty: 30 TABLET | Refills: 1 | Status: SHIPPED | OUTPATIENT
Start: 2021-09-21 | End: 2021-10-07 | Stop reason: SDUPTHER

## 2021-09-22 NOTE — TELEPHONE ENCOUNTER
Hypercholesteremia     Chest pain     Closed fracture of lower end of left radius with routine healing     Establishing care with new doctor, encounter for

## 2021-09-23 RX ORDER — ATORVASTATIN CALCIUM 40 MG/1
40 TABLET, FILM COATED ORAL NIGHTLY
Qty: 90 TABLET | Refills: 0 | Status: SHIPPED | OUTPATIENT
Start: 2021-09-23 | End: 2021-12-30 | Stop reason: SDUPTHER

## 2021-10-07 ENCOUNTER — OFFICE VISIT (OUTPATIENT)
Dept: PRIMARY CARE CLINIC | Age: 56
End: 2021-10-07

## 2021-10-07 VITALS
OXYGEN SATURATION: 99 % | WEIGHT: 178 LBS | HEART RATE: 79 BPM | SYSTOLIC BLOOD PRESSURE: 163 MMHG | HEIGHT: 65 IN | BODY MASS INDEX: 29.66 KG/M2 | DIASTOLIC BLOOD PRESSURE: 107 MMHG | TEMPERATURE: 97.2 F

## 2021-10-07 DIAGNOSIS — Z11.1 ENCOUNTER FOR PPD TEST: ICD-10-CM

## 2021-10-07 DIAGNOSIS — Z76.0 MEDICATION REFILL: ICD-10-CM

## 2021-10-07 DIAGNOSIS — I10 ESSENTIAL HYPERTENSION: ICD-10-CM

## 2021-10-07 DIAGNOSIS — Z02.89 ENCOUNTER FOR PHYSICAL EXAMINATION RELATED TO EMPLOYMENT: Primary | ICD-10-CM

## 2021-10-07 PROCEDURE — 99203 OFFICE O/P NEW LOW 30 MIN: CPT

## 2021-10-07 PROCEDURE — 86580 TB INTRADERMAL TEST: CPT

## 2021-10-07 RX ORDER — AMLODIPINE BESYLATE 10 MG/1
10 TABLET ORAL DAILY
Qty: 30 TABLET | Refills: 0 | Status: SHIPPED | OUTPATIENT
Start: 2021-10-07 | End: 2021-12-30 | Stop reason: SDUPTHER

## 2021-10-07 RX ORDER — ACETAMINOPHEN 160 MG
TABLET,DISINTEGRATING ORAL
Qty: 30 CAPSULE | Refills: 2 | Status: SHIPPED | OUTPATIENT
Start: 2021-10-07 | End: 2022-02-02 | Stop reason: SDUPTHER

## 2021-10-07 ASSESSMENT — ENCOUNTER SYMPTOMS
EYE PAIN: 0
SORE THROAT: 0
DIARRHEA: 0
SHORTNESS OF BREATH: 0
ABDOMINAL PAIN: 0
BACK PAIN: 0
CHEST TIGHTNESS: 0
VOMITING: 0

## 2021-10-07 NOTE — PATIENT INSTRUCTIONS
Need to have two-step PPD completed before form can be signed. Please keep a daily log of BP readings as discussed and bring to your next appointment with your PCP. Refills sent to pharmacy. Have the PPD read in 48-72 hours. Patient Education        Well Visit, Women 48 to 72: Care Instructions  Overview     Well visits can help you stay healthy. Your doctor has checked your overall health and may have suggested ways to take good care of yourself. Your doctor also may have recommended tests. At home, you can help prevent illness with healthy eating, regular exercise, and other steps. Follow-up care is a key part of your treatment and safety. Be sure to make and go to all appointments, and call your doctor if you are having problems. It's also a good idea to know your test results and keep a list of the medicines you take. How can you care for yourself at home? · Get screening tests that you and your doctor decide on. Screening helps find diseases before any symptoms appear. · Eat healthy foods. Choose fruits, vegetables, whole grains, protein, and low-fat dairy foods. Limit fat, especially saturated fat. Reduce salt in your diet. · Limit alcohol. Have no more than 1 drink a day or 7 drinks a week. · Get at least 30 minutes of exercise on most days of the week. Walking is a good choice. You also may want to do other activities, such as running, swimming, cycling, or playing tennis or team sports. · Reach and stay at a healthy weight. This will lower your risk for many problems, such as obesity, diabetes, heart disease, and high blood pressure. · Do not smoke. Smoking can make health problems worse. If you need help quitting, talk to your doctor about stop-smoking programs and medicines. These can increase your chances of quitting for good. · Care for your mental health. It is easy to get weighed down by worry and stress.  Learn strategies to manage stress, like deep breathing and mindfulness, and stay connected with your family and community. If you find you often feel sad or hopeless, talk with your doctor. Treatment can help. · Talk to your doctor about whether you have any risk factors for sexually transmitted infections (STIs). You can help prevent STIs if you wait to have sex with a new partner (or partners) until you've each been tested for STIs. It also helps if you use condoms (male or female condoms) and if you limit your sex partners to one person who only has sex with you. Vaccines are available for some STIs. · If you think you may have a problem with alcohol or drug use, talk to your doctor. This includes prescription medicines (such as amphetamines and opioids) and illegal drugs (such as cocaine and methamphetamine). Your doctor can help you figure out what type of treatment is best for you. · Protect your skin from too much sun. When you're outdoors from 10 a.m. to 4 p.m., stay in the shade or cover up with clothing and a hat with a wide brim. Wear sunglasses that block UV rays. Even when it's cloudy, put broad-spectrum sunscreen (SPF 30 or higher) on any exposed skin. · See a dentist one or two times a year for checkups and to have your teeth cleaned. · Wear a seat belt in the car. When should you call for help? Watch closely for changes in your health, and be sure to contact your doctor if you have any problems or symptoms that concern you. Where can you learn more? Go to https://[facility]. org and sign in to your [employer] account. Enter Y946 in the [facility] box to learn more about \"Well Visit, Women 50 to 72: Care Instructions. \"     If you do not have an account, please click on the \"Sign Up Now\" link. Current as of: February 11, 2021               Content Version: 13.0  © 8514-4338 Healthwise, Incorporated. Care instructions adapted under license by [facility] ([facility]).  If you have questions about a medical condition or this instruction, always ask your healthcare professional. [address] any warranty or liability for your use of this information.

## 2021-10-07 NOTE — PROGRESS NOTES
Visit Information    Have you changed or started any medications since your last visit including any over-the-counter medicines, vitamins, or herbal medicines? no   Are you having any side effects from any of your medications? -  no  Have you stopped taking any of your medications? Is so, why? -  no    Have you seen any other physician or provider since your last visit? No  Have you had any other diagnostic tests since your last visit? No  Have you been seen in the emergency room and/or had an admission to a hospital since we last saw you? No  Have you had your routine dental cleaning in the past 6 months? no    Have you activated your [employer] account? If not, what are your barriers?  No:      Patient Care Team:  JESIKA Caro NP as PCP - General (Vascular Surgery)  Augustine Contreras DPM as Physician (Podiatry)  Yadi Curran MD as Consulting Physician (Gastroenterology)    Medical History Review  Past Medical, Family, and Social History reviewed and does contribute to the patient presenting condition    Health Maintenance   Topic Date Due    Hepatitis C screen  Never done    Pneumococcal 0-64 years Vaccine (1 of 2 - PPSV23) Never done    COVID-19 Vaccine (1) Never done    Breast cancer screen  Never done    Cervical cancer screen  07/05/2020    Flu vaccine (1) 09/01/2021    Shingles Vaccine (1 of 2) 03/23/2022 (Originally 7/3/2016)    Lipid screen  10/22/2021    Potassium monitoring  11/22/2021    Creatinine monitoring  11/22/2021    Diabetes screen  07/26/2022    Colon cancer screen colonoscopy  03/20/2023    DTaP/Tdap/Td vaccine (3 - Td or Tdap) 04/12/2023    Hepatitis A vaccine  Aged Out    Hepatitis B vaccine  Aged Out    Hib vaccine  Aged Out    Meningococcal (ACWY) vaccine  Aged Out    HIV screen  Discontinued

## 2021-10-07 NOTE — PROGRESS NOTES
[address] WALK-IN PRIMARY CARE  [address] [facility] [address]    [address] [facility] WALK IN CARE  [address] [address]  Dept: [phone]    Can Fonseca is a 55 y.o. female Established patient, who presents to the walk-in clinic today with conditions/complaints as noted below:    Chief Complaint   Patient presents with    Annual Exam     Work physical          HPI:     Patient is a 55-year-old female who presents today for a work physical. States that she currently has no medical complaints. Patient denies any recent sickness/illness. Patient denies any recent injuries. All immunizations are up-to-date. Patient requires a two-step PPD for employment. She would also like refills of her Vitamin D and blood pressure medication. Past Medical History: Ischemic Stroke, Hypertension, Asthma     Prior Surgical History: Lithotripsy, Cholecystectomy,     Current Medications: Norvasc 10 mg, Lipitor 40 mg, Phenergan 12.5 mg    Allergies: Sulfa Antibiotics      Past Medical History:   Diagnosis Date    Asthma     CVA [facility]cerebral vascular accident) ([facility] [address]     Hypertension     Kidney stone        Current Outpatient Medications   Medication Sig Dispense Refill    amLODIPine (NORVASC) 10 MG tablet Take 1 tablet by mouth daily 30 tablet 0    Cholecalciferol (VITAMIN D3) 50 MCG ( UT) CAPS take 1 capsule by mouth once daily 30 capsule 2    atorvastatin (LIPITOR) 40 MG tablet Take 1 tablet by mouth nightly 90 tablet 0    acetaminophen (TYLENOL) 325 MG tablet Take 2 tablets by mouth every 6 hours as needed for Pain 60 tablet 0    ibuprofen (ADVIL;MOTRIN) 200 MG tablet Take 2 tablets by mouth every 6 hours as needed for Pain or Fever 60 tablet 0    promethazine (PHENERGAN) 12.5 MG tablet Take 1 tablet by mouth every 6 hours as needed for Nausea 30 tablet 2    ciclopirox (PENLAC) 8 % solution Apply topically nightly.  Remove once weekly with alcohol or nail polish remover. 1 Bottle 3    ASPIRIN LOW DOSE 81 MG EC tablet take 1 tablet by mouth once daily (Patient not taking: Reported on 6/3/2021) 90 tablet 0    SUMAtriptan (IMITREX) 25 MG tablet Take 0.5-1 tablets by mouth once as needed for Migraine (Patient not taking: Reported on 6/3/2021) 7 tablet 0    triamcinolone (KENALOG) 0.025 % ointment apply to affected area twice a day TO RASH FOR UP TO 2 WEEKS. (Patient not taking: Reported on 6/3/2021) 15 g 0     No current facility-administered medications for this visit. Allergies   Allergen Reactions    Sulfa Antibiotics Itching       :     Review of Systems   Constitutional: Negative for chills, fatigue and fever. HENT: Negative for congestion, ear pain and sore throat. Eyes: Negative for pain and visual disturbance. Respiratory: Negative for chest tightness and shortness of breath. Cardiovascular: Negative for chest pain, palpitations and leg swelling. Gastrointestinal: Negative for abdominal pain, diarrhea and vomiting. Genitourinary: Negative for dysuria and hematuria. Musculoskeletal: Negative for back pain and joint swelling. Skin: Negative for rash. Neurological: Negative for dizziness, syncope and weakness. Hematological: Negative for adenopathy. Psychiatric/Behavioral: Negative for behavioral problems.       :     BP (!) 163/107   Pulse 79   Temp 97.2 °F (36.2 °C)   Ht 5' 5\" (1.651 m)   Wt 178 lb (80.7 kg)   SpO2 99%   BMI 29.62 kg/m²     Physical Exam  Vitals reviewed. Constitutional:       General: She is not in acute distress. Appearance: Normal appearance. HENT:      Head: Normocephalic and atraumatic. Right Ear: Tympanic membrane, ear canal and external ear normal.      Left Ear: Tympanic membrane, ear canal and external ear normal.      Nose: Nose normal.      Mouth/Throat:      Mouth: Mucous membranes are moist.      Pharynx: Oropharynx is clear.    Eyes:      Extraocular Movements: Extraocular movements intact. Conjunctiva/sclera: Conjunctivae normal.      Pupils: Pupils are equal, round, and reactive to light. Cardiovascular:      Rate and Rhythm: Normal rate and regular rhythm. Pulses: Normal pulses. Heart sounds: Normal heart sounds. Pulmonary:      Effort: Pulmonary effort is normal.      Breath sounds: Normal breath sounds. Abdominal:      General: Abdomen is flat. Bowel sounds are normal.      Palpations: Abdomen is soft. Musculoskeletal:         General: Normal range of motion. Cervical back: Neck supple. Lymphadenopathy:      Cervical: No cervical adenopathy. Skin:     General: Skin is warm and dry. Capillary Refill: Capillary refill takes less than 2 seconds. Neurological:      General: No focal deficit present. Mental Status: She is alert and oriented to person, place, and time. Psychiatric:         Attention and Perception: Attention normal.         Mood and Affect: Mood normal.         Speech: Speech normal.         Behavior: Behavior normal. Behavior is cooperative.           :          1. Encounter for physical examination related to employment  2. Encounter for PPD test  -     Mantoux testing  3. Essential hypertension  -     amLODIPine (NORVASC) 10 MG tablet; Take 1 tablet by mouth daily, Disp-30 tablet, R-0Normal  4. Medication refill  -     amLODIPine (NORVASC) 10 MG tablet; Take 1 tablet by mouth daily, Disp-30 tablet, R-0Normal  -     Cholecalciferol (VITAMIN D3) 50 MCG (2000 UT) CAPS; take 1 capsule by mouth once daily, Disp-30 capsule, R-2Normal       :      No follow-ups on file.     Orders Placed This Encounter   Medications    amLODIPine (NORVASC) 10 MG tablet     Sig: Take 1 tablet by mouth daily     Dispense:  30 tablet     Refill:  0    Cholecalciferol (VITAMIN D3) 50 MCG (2000 UT) CAPS     Sig: take 1 capsule by mouth once daily     Dispense:  30 capsule     Refill:  2     Initial step of TB testing performed in office, patient tolerated. Applicable areas of work physical form filled out and scanned into system. Refills of requested medications sent to pharmacy. Advised the patient to keep a daily log of her blood pressure readings and bring to the next appointment with her PCP. Instructed the patient to have her PPD read in 48-72 hours. Follow-up with PCP as needed. Patient and/or parent given educational materials - see patient instructions. Discussed use, benefit, and side effects of prescribed medications. All patient questions answered. Patient and/or parent voiced understanding.       Electronically signed by JESIKA Villafuerte 10/7/2021 at 9:34 AM

## 2021-10-20 ENCOUNTER — NURSE TRIAGE (OUTPATIENT)
Dept: OTHER | Facility: CLINIC | Age: 56
End: 2021-10-20

## 2021-10-20 NOTE — TELEPHONE ENCOUNTER
Reason for Disposition  [facility] Caller has already spoken with another triager or PCP (or office), and has further questions and triager able to answer questions. Protocols used: NO CONTACT OR DUPLICATE CONTACT CALL-ADULT-[place]    Received call from Naina Sharif at [facility] with [facility] [employer]. Triage not done. Pt instructed to go to [facility] for symptoms earlier today. Calling back to say that she doesn't have insurance. This RN recommended that she still go to [facility], that a provider will see her there. Given name, address and phone number of a local [facility]. Care advice provided, patient verbalizes understanding; denies any other questions or concerns; instructed to call back for any new or worsening symptoms. for appointment scheduling. Attention Provider: Thank you for allowing me to participate in the care of your patient. The patient was connected to triage in response to information provided to the ECC/PSC. Please do not respond through this encounter as the response is not directed to a shared pool.

## 2021-10-20 NOTE — TELEPHONE ENCOUNTER
Received call from  [facility]  at [facility] with [place]. Pt hung up prior to ecc transfer   1st attempt [id number]-      Brief description of triage:  56 y/o urinary symptoms  X 3 days  Flank pain  7/10, pt reports tea colored urine, urgency and frequency  And feelings of nausea       Triage indicates for patient to [address] or pcp with approval   2nd level triage   Spoke to   [place] [facility]   And   Per MD pt is to be seen at [place] [address] advice provided, patient verbalizes understanding; denies any other questions or concerns; instructed to call back for any new or worsening symptoms. Attention Provider: Thank you for allowing me to participate in the care of your patient. The patient was connected to triage in response to information provided to the ECC/PSC. Please do not respond through this encounter as the response is not directed to a shared pool. Reason for Disposition   Blood in urine (red, pink, or tea-colored)    Answer Assessment - Initial Assessment Questions  1. LOCATION: \"Where does it hurt? \" (e.g., left, right)      Both     2. ONSET: \"When did the pain start? \"      3 days ago     3. SEVERITY: \"How bad is the pain? \" (e.g., Scale 1-10; mild, moderate, or severe)    - MILD (1-3): doesn't interfere with normal activities     - MODERATE (4-7): interferes with normal activities or awakens from sleep     - SEVERE (8-10): excruciating pain and patient unable to do normal activities (stays in bed)        Moderate pain     4. PATTERN: \"Does the pain come and go, or is it constant? \"       Intermittent     5. CAUSE: \"What do you think is causing the pain? \"     uti    6. OTHER SYMPTOMS:  \"Do you have any other symptoms? \" (e.g., fever, abdominal pain, vomiting, leg weakness, burning with urination, blood in urine)     Frequent urination,  Flank,  Nausea  ,stomahc pain with going     7. PREGNANCY:  \"Is there any chance you are pregnant? \" \"When was your last menstrual period? \" n/a    Protocols used:  FLANK PAIN-ADULT-[place]

## 2021-10-26 ENCOUNTER — OFFICE VISIT (OUTPATIENT)
Dept: PRIMARY CARE CLINIC | Age: 56
End: 2021-10-26

## 2021-10-26 ENCOUNTER — HOSPITAL ENCOUNTER (OUTPATIENT)
Age: 56
Setting detail: SPECIMEN
Discharge: HOME | End: 2021-10-26

## 2021-10-26 VITALS — OXYGEN SATURATION: 99 % | SYSTOLIC BLOOD PRESSURE: 141 MMHG | HEART RATE: 91 BPM | DIASTOLIC BLOOD PRESSURE: 97 MMHG

## 2021-10-26 DIAGNOSIS — N34.2 INFECTIVE URETHRITIS: ICD-10-CM

## 2021-10-26 DIAGNOSIS — N30.90 BLADDER INFECTION: ICD-10-CM

## 2021-10-26 DIAGNOSIS — N30.90 BLADDER INFECTION: Primary | ICD-10-CM

## 2021-10-26 LAB
-: NORMAL
AMORPHOUS: NORMAL
BACTERIA: NORMAL
BILIRUBIN URINE: NEGATIVE
BILIRUBIN, POC: ABNORMAL
BLOOD URINE, POC: ABNORMAL
CASTS UA: NORMAL /LPF (ref 0–8)
CLARITY, POC: CLEAR
COLOR, POC: YELLOW
COLOR: YELLOW
COMMENT UA: ABNORMAL
CRYSTALS, UA: NORMAL /HPF
EPITHELIAL CELLS UA: NORMAL /HPF (ref 0–5)
GLUCOSE URINE, POC: ABNORMAL
GLUCOSE URINE: NEGATIVE
KETONES, POC: ABNORMAL
KETONES, URINE: NEGATIVE
LEUKOCYTE EST, POC: ABNORMAL
LEUKOCYTE ESTERASE, URINE: NEGATIVE
MUCUS: NORMAL
NITRITE, POC: ABNORMAL
NITRITE, URINE: NEGATIVE
OTHER OBSERVATIONS UA: NORMAL
PH UA: 7 (ref 5–8)
PH, POC: 6.5
PROTEIN UA: ABNORMAL
PROTEIN, POC: ABNORMAL
RBC UA: NORMAL /HPF (ref 0–4)
RENAL EPITHELIAL, UA: NORMAL /HPF
SPECIFIC GRAVITY UA: 1.01 (ref 1–1.03)
SPECIFIC GRAVITY, POC: 1.02
TRICHOMONAS: NORMAL
TURBIDITY: CLEAR
URINE HGB: NEGATIVE
UROBILINOGEN, POC: ABNORMAL
UROBILINOGEN, URINE: NORMAL
WBC UA: NORMAL /HPF (ref 0–5)
YEAST: NORMAL

## 2021-10-26 PROCEDURE — 81003 URINALYSIS AUTO W/O SCOPE: CPT | Performed by: INTERNAL MEDICINE

## 2021-10-26 PROCEDURE — 99213 OFFICE O/P EST LOW 20 MIN: CPT | Performed by: INTERNAL MEDICINE

## 2021-10-26 RX ORDER — CIPROFLOXACIN 250 MG/1
250 TABLET, FILM COATED ORAL 2 TIMES DAILY
Qty: 14 TABLET | Refills: 0 | Status: SHIPPED | OUTPATIENT
Start: 2021-10-26 | End: 2021-11-02

## 2021-11-17 ENCOUNTER — OFFICE VISIT (OUTPATIENT)
Dept: PODIATRY | Age: 56
End: 2021-11-17

## 2021-11-17 VITALS — WEIGHT: 178 LBS | BODY MASS INDEX: 29.66 KG/M2 | HEIGHT: 65 IN

## 2021-11-17 DIAGNOSIS — M79.605 PAIN IN BOTH LOWER EXTREMITIES: ICD-10-CM

## 2021-11-17 DIAGNOSIS — B35.1 DERMATOPHYTOSIS OF NAIL: ICD-10-CM

## 2021-11-17 DIAGNOSIS — L60.0 INGROWN NAIL: Primary | ICD-10-CM

## 2021-11-17 DIAGNOSIS — M79.604 PAIN IN BOTH LOWER EXTREMITIES: ICD-10-CM

## 2021-11-17 PROCEDURE — 99213 OFFICE O/P EST LOW 20 MIN: CPT | Performed by: PODIATRIST

## 2021-11-17 NOTE — PATIENT INSTRUCTIONS
Schedule a Vaccine  When you qualify to receive the vaccine, call the [facility]) COVID-19 Vaccination Hotline to schedule your appointment or to get additional information about the [facility]) locations which are offering the COVID-19 vaccine. To be 94% effective, it's important that you receive two doses of one of the COVID-19 vaccines. -If you are receiving the [employer] vaccine, your second shot will be scheduled as close to 21 days after the first shot as possible. -If you are receiving the Moderna vaccine, your second shot will be scheduled as close to 28 days after the first shot as possible. [facility]) COVID-19 Vaccination Hotline: [phone]    Links to [facility]) website and [facility] website:    [email or web address]    [email or web address]

## 2021-11-17 NOTE — PROGRESS NOTES
[facility] PHYSICIANS  [facility]  [address] [place] [address]  Dept: [phone]  Dept Fax: [phone]    RETURN PATIENT PROGRESS NOTE  Date of patient's visit: 11/17/2021  Patient's Name:  Colleen Yepez YOB: 1965            Patient Care Team:  Ksenia Rossi DPM as Physician (Jd Vargas)  Vernell Ballesteros MD as Consulting Physician (Gastroenterology)       Colleen Yepez 55 y.o. female that presents for follow-up of   Chief Complaint   Patient presents with    Toe Pain     right great toe        Symptoms began several week(s) ago and are unchanged . Patient relates pain is present. Pain is rated 3 out of 10 and is described as none. Patient mary pain c/o of tingling. Treatments prior to today's visit include: none. Currently denies F/C/N/V. Allergies   Allergen Reactions    Sulfa Antibiotics Itching       Past Medical History:   Diagnosis Date    Asthma     CVA [facility]cerebral vascular accident) ([facility] [address]     Hypertension     Kidney stone        Prior to Admission medications    Medication Sig Start Date End Date Taking?  Authorizing Provider   amLODIPine (NORVASC) 10 MG tablet Take 1 tablet by mouth daily 10/7/21  Yes JESIKA Ferguson CNP   Cholecalciferol (VITAMIN D3) 50 MCG (2000 UT) CAPS take 1 capsule by mouth once daily 10/7/21  Yes JESIKA Ferguson CNP   atorvastatin (LIPITOR) 40 MG tablet Take 1 tablet by mouth nightly 9/23/21  Yes JESIKA Diallo NP   acetaminophen (TYLENOL) 325 MG tablet Take 2 tablets by mouth every 6 hours as needed for Pain 7/3/21  Yes John Faye MD   ibuprofen (ADVIL;MOTRIN) 200 MG tablet Take 2 tablets by mouth every 6 hours as needed for Pain or Fever 7/3/21  Yes John Faye MD   promethazine (PHENERGAN) 12.5 MG tablet Take 1 tablet by mouth every 6 hours as needed for Nausea 5/11/21  Yes JESIKA Catherine CNP   ASPIRIN LOW DOSE 81 MG EC tablet take 1 tablet by mouth once daily 4/5/21  Yes Ras Onofre PA-C   triamcinolone (KENALOG) 0.025 % ointment apply to affected area twice a day TO RASH FOR UP TO 2 WEEKS. 10/10/19  Yes JESIKA Sears - CNP   ciclopirox (PENLAC) 8 % solution Apply topically nightly. Remove once weekly with alcohol or nail polish remover. 8/5/19  Yes Kelsi Leonard DPM   SUMAtriptan (IMITREX) 25 MG tablet Take 0.5-1 tablets by mouth once as needed for Migraine  Patient not taking: Reported on 6/3/2021 3/23/21 3/23/21  Ras Onofre PA-C       Review of Systems    Review of Systems:  History obtained from chart review and the patient  General ROS: negative for - chills, fatigue, fever, night sweats or weight gain  Constitutional: Negative for chills, diaphoresis, fatigue, fever and unexpected weight change. Musculoskeletal: Positive for arthralgias, gait problem and joint swelling. Neurological ROS: negative for - behavioral changes, confusion, headaches or seizures. Negative for weakness and numbness. Dermatological ROS: negative for - mole changes, rash  Cardiovascular: Negative for leg swelling. Gastrointestinal: Negative for constipation, diarrhea, nausea and vomiting. Lower Extremity Physical Examination:     Vitals: There were no vitals filed for this visit. General: AAO x 3 in NAD. Dermatologic Exam:  Right hallux nail is incurvated with increased edema, no erythema    Musculoskeletal:     1st MPJ ROM decreased, Bilateral.  Muscle strength 5/5, Bilateral.  Pain present upon palpation of right hallux. Medial longitudinal arch, Bilateral WNL.   Ankle ROM WNL,Bilateral.    Dorsally contracted digits absent digits 1-5 Bilateral.     Vascular: DP and PT pulses palpable 2/4, Bilateral.  CFT <3 seconds, Bilateral.  Hair growth present to the level of the digits, Bilateral.  Edema absent, Bilateral.  Varicosities absent, Bilateral. Erythema absent, Bilateral    Neurological: Sensation intact to light touch to level of digits, Bilateral.  Protective sensation intact 10/10 sites via 5.07/10g Arnold-Steve Monofilament, Bilateral.  negative Tinel's, Bilateral.  negative Valleix sign, Bilateral.      Integument: Warm, dry, supple, Bilateral.  Open lesion absent, Bilateral.  Interdigital maceration absent to web spaces 1-4, Bilateral.    Fissures absent, Bilateral.       Asessment: Patient is a 55 y.o. female with:    Diagnosis Orders   1. Ingrown nail     2. Pain in both lower extremities     3. Dermatophytosis of nail           Plan: Patient examined and evaluated. Current condition and treatment options discussed in detail. Slant back procedure perform on nail border using nail nipper to patients tolerance. Advised pt to consider nail avulsion at next visit if symptoms persist or worsen. Pt to monitor for increased signs of infection such as erythema, edema and drainage. Advised pt to monitor daily for infection. Verbal and written instructions given to patient. Contact office with any questions/problems/concerns. No orders of the defined types were placed in this encounter. No orders of the defined types were placed in this encounter. RTC in 2week(s).     11/17/2021      Electronically signed by Silvestre Begum DPM on 11/17/2021 at 9:10 AM  11/17/2021

## 2021-11-28 ENCOUNTER — HOSPITAL ENCOUNTER (EMERGENCY)
Age: 56
Discharge: HOME | End: 2021-11-28
Attending: EMERGENCY MEDICINE
Payer: COMMERCIAL

## 2021-11-28 ENCOUNTER — APPOINTMENT (OUTPATIENT)
Dept: GENERAL RADIOLOGY | Age: 56
End: 2021-11-28
Payer: COMMERCIAL

## 2021-11-28 VITALS
OXYGEN SATURATION: 97 % | BODY MASS INDEX: 33.04 KG/M2 | HEART RATE: 88 BPM | RESPIRATION RATE: 12 BRPM | DIASTOLIC BLOOD PRESSURE: 91 MMHG | WEIGHT: 175 LBS | TEMPERATURE: 99.1 F | SYSTOLIC BLOOD PRESSURE: 147 MMHG | HEIGHT: 61 IN

## 2021-11-28 DIAGNOSIS — T14.8XXA MUSCLE STRAIN: Primary | ICD-10-CM

## 2021-11-28 DIAGNOSIS — M79.604 RIGHT LEG PAIN: ICD-10-CM

## 2021-11-28 PROCEDURE — 99283 EMERGENCY DEPT VISIT LOW MDM: CPT

## 2021-11-28 PROCEDURE — 73502 X-RAY EXAM HIP UNI 2-3 VIEWS: CPT

## 2021-11-28 RX ORDER — TIZANIDINE 2 MG/1
2 TABLET ORAL EVERY 8 HOURS PRN
Qty: 15 TABLET | Refills: 0 | Status: SHIPPED | OUTPATIENT
Start: 2021-11-28 | End: 2022-02-02

## 2021-11-28 ASSESSMENT — ENCOUNTER SYMPTOMS
BACK PAIN: 0
COLOR CHANGE: 0
ABDOMINAL PAIN: 0

## 2021-11-28 ASSESSMENT — PAIN SCALES - GENERAL: PAINLEVEL_OUTOF10: 8

## 2021-11-28 NOTE — LETTER
[facility] ED  [address] [facility] [address]  Phone: [phone]             November 28, 2021    Patient: Wilber Blue   YOB: 1965   Date of Visit: 11/28/2021       To Whom It May Concern:    Randy Hayes was seen and treated in our emergency department on 11/28/2021. Please excuse her from work 11/28/21 through 11/30/21.       Sincerely,             Signature:__________________________________

## 2021-11-28 NOTE — ED PROVIDER NOTES
eMERGENCY dEPARTMENT eNCOUnter   Independent Attestation     Pt Name: Harley Lainez  MRN: [id number]  [place] 1965  Date of evaluation: 11/28/21     Harley Lainez is a 55 y.o. female with CC: Leg Pain (right leg )      Based on the medical record the care appears appropriate. I was personally available for consultation in the Emergency Department. The care is provided during an unprecedented national emergency due to the novel coronavirus, COVID 19.     Jhonny Armenta DO  Attending Emergency Physician                    Guevara Hsu DO  11/28/21 2283

## 2021-11-28 NOTE — ED PROVIDER NOTES
[facility] ED  eMERGENCY dEPARTMENT eNCOUnter      Pt Name: Colleen Yepez  MRN: [id number]  [place] 1965  Date of evaluation: 11/28/2021  Provider: JESIKA Ogden [id number]       Chief Complaint   Patient presents with    Leg Pain     right leg          HISTORY OF PRESENT ILLNESS  (Location/Symptom, Timing/Onset, Context/Setting, Quality, Duration, Modifying Factors, Severity.)   Colleen Yepez is a 55 y.o. female who presents to the emergency department via private auto for pain to her right groin, hip area s/p injury yesterday at work. 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 She has taken motrin without relief. Denies pain to her back. Denies N/T. Rates her pain 8/10 at this time. Nursing Notes were reviewed. ALLERGIES     Sulfa antibiotics    CURRENT MEDICATIONS       Previous Medications    ACETAMINOPHEN (TYLENOL) 325 MG TABLET    Take 2 tablets by mouth every 6 hours as needed for Pain    AMLODIPINE (NORVASC) 10 MG TABLET    Take 1 tablet by mouth daily    ASPIRIN LOW DOSE 81 MG EC TABLET    take 1 tablet by mouth once daily    ATORVASTATIN (LIPITOR) 40 MG TABLET    Take 1 tablet by mouth nightly    CHOLECALCIFEROL (VITAMIN D3) 50 MCG (2000 UT) CAPS    take 1 capsule by mouth once daily    CICLOPIROX (PENLAC) 8 % SOLUTION    Apply topically nightly. Remove once weekly with alcohol or nail polish remover. IBUPROFEN (ADVIL;MOTRIN) 200 MG TABLET    Take 2 tablets by mouth every 6 hours as needed for Pain or Fever    PROMETHAZINE (PHENERGAN) 12.5 MG TABLET    Take 1 tablet by mouth every 6 hours as needed for Nausea    SUMATRIPTAN (IMITREX) 25 MG TABLET    Take 0.5-1 tablets by mouth once as needed for Migraine    TRIAMCINOLONE (KENALOG) 0.025 % OINTMENT    apply to affected area twice a day TO RASH FOR UP TO 2 WEEKS.        PAST MEDICAL HISTORY         Diagnosis Date    Asthma     [facility]A [facility]cerebral vascular accident) ([facility] [address]     Hypertension     Kidney stone        SURGICAL HISTORY           Procedure Laterality Date    CHOLECYSTECTOMY      LITHOTRIPSY           FAMILY HISTORY     History reviewed. No pertinent family history. No family status information on file. SOCIAL HISTORY      reports that she has never smoked. She has never used smokeless tobacco. She reports that she does not drink alcohol and does not use drugs. REVIEW OF SYSTEMS    (2-9 systems for level 4, 10 or more for level 5)     Review of Systems   Constitutional: Negative for chills, diaphoresis, fatigue and fever. Gastrointestinal: Negative for abdominal pain. Genitourinary: Negative for difficulty urinating and dysuria. Musculoskeletal: Positive for arthralgias and myalgias. Negative for back pain, gait problem and neck pain. Skin: Negative for color change, rash and wound. Neurological: Negative for weakness and numbness. Except as noted above the remainder of the review of systems was reviewed and negative. PHYSICAL EXAM    (up to 7 for level 4, 8 or more for level 5)     ED Triage Vitals [11/28/21 1416]   BP Temp Temp Source Pulse Resp SpO2 Height Weight   (!) 147/91 99.1 °F (37.3 °C) Oral 88 12 97 % 5' 1\" (1.549 m) 175 lb (79.4 kg)     Physical Exam  Vitals reviewed. Constitutional:       General: She is not in acute distress. Appearance: She is well-developed. She is not diaphoretic. Eyes:      General: No scleral icterus. Conjunctiva/sclera: Conjunctivae normal.   Cardiovascular:      Rate and Rhythm: Normal rate. Pulses: Normal pulses. Pulmonary:      Effort: Pulmonary effort is normal. No respiratory distress. Breath sounds: No stridor. Musculoskeletal:      Lumbar back: No swelling, deformity, tenderness or bony tenderness. Right hip: Tenderness present. No deformity or bony tenderness. Normal range of motion. Right upper leg: Tenderness present. No swelling, deformity or bony tenderness.         Legs:    Skin: General: Skin is warm and dry. Capillary Refill: Capillary refill takes less than 2 seconds. Findings: No rash. Neurological:      Mental Status: She is alert and oriented to person, place, and time. Motor: Motor function is intact. Coordination: Coordination is intact. Gait: Gait is intact. Psychiatric:         Behavior: Behavior normal.         DIAGNOSTIC RESULTS     RADIOLOGY:   Non-plain film images such as CT, Ultrasound and MRI are read by the radiologist. Philippi Silvia radiographic images are visualized and preliminarily interpreted by the emergency physician with the below findings:    Interpretation per the Radiologist below, if available at the time of this note:    XR HIP 2-3 VW W PELVIS RIGHT    Result Date: 11/28/2021  EXAMINATION: [id number] 11/28/2021 3:38 pm COMPARISON: None. HISTORY: ORDERING SYSTEM PROVIDED HISTORY: pain TECHNOLOGIST PROVIDED HISTORY: pain Reason for Exam: upper right leg pain near groin area after trying to catch someone from falling at work yesterday. Acuity: Acute Type of Exam: Initial FINDINGS: Pelvic ring intact. Hips in anatomic alignment. No DJD or fracture     EMERGENCY DEPARTMENT COURSE and DIFFERENTIAL DIAGNOSIS/MDM:   Vitals:    Vitals:    11/28/21 1416   BP: (!) 147/91   Pulse: 88   Resp: 12   Temp: 99.1 °F (37.3 °C)   TempSrc: Oral   SpO2: 97%   Weight: 175 lb (79.4 kg)   Height: 5' 1\" (1.549 m)       CLINICAL DECISION MAKING:  The patient presented alert with a nontoxic appearance and was seen in conjunction with Dr. Carlos Ernandez. Imaging was negative for acute findings. A prescription was written for Zanaflex. The patient was advised to not drink alcohol, drive, or operate heavy machinery while taking the medication. Follow up with occupational health for a recheck. Evaluation and treatment course in the ED, and plan of care upon discharge was discussed in length with the patient.   Patient had no

## 2021-12-24 NOTE — TELEPHONE ENCOUNTER
Patient scheduled for February 6 with [facility]. Patient requested to change providers. [id number] [facility]

## 2021-12-30 ENCOUNTER — OFFICE VISIT (OUTPATIENT)
Dept: PRIMARY CARE CLINIC | Age: 56
End: 2021-12-30
Payer: COMMERCIAL

## 2021-12-30 VITALS
SYSTOLIC BLOOD PRESSURE: 147 MMHG | OXYGEN SATURATION: 97 % | DIASTOLIC BLOOD PRESSURE: 98 MMHG | TEMPERATURE: 97 F | HEART RATE: 85 BPM

## 2021-12-30 DIAGNOSIS — I10 ESSENTIAL HYPERTENSION: ICD-10-CM

## 2021-12-30 DIAGNOSIS — Z76.0 MEDICATION REFILL: ICD-10-CM

## 2021-12-30 PROCEDURE — 99213 OFFICE O/P EST LOW 20 MIN: CPT | Performed by: INTERNAL MEDICINE

## 2021-12-30 RX ORDER — PROMETHAZINE HYDROCHLORIDE 12.5 MG/1
12.5 TABLET ORAL EVERY 6 HOURS PRN
Qty: 15 TABLET | Refills: 0 | Status: SHIPPED | OUTPATIENT
Start: 2021-12-30

## 2021-12-30 RX ORDER — ATORVASTATIN CALCIUM 40 MG/1
40 TABLET, FILM COATED ORAL NIGHTLY
Qty: 390 TABLET | Refills: 0 | Status: SHIPPED | OUTPATIENT
Start: 2021-12-30 | End: 2022-02-02 | Stop reason: SDUPTHER

## 2021-12-30 RX ORDER — AMLODIPINE BESYLATE 10 MG/1
10 TABLET ORAL DAILY
Qty: 30 TABLET | Refills: 0 | Status: SHIPPED | OUTPATIENT
Start: 2021-12-30 | End: 2022-02-02 | Stop reason: SDUPTHER

## 2021-12-30 NOTE — PROGRESS NOTES
[facility]  [facility]  [address] [place] [address]  Dept: [phone]  Dept Fax: [phone]    Padmaja Meraz is a 55 y.o. female who presents to the urgent care today for her medicalconditions/complaints as noted below. Padmaja Meraz is c/o of Medication Refill      HPI:     Hypertension  This is a chronic problem. The current episode started more than 1 year ago. The problem is unchanged. Risk factors for coronary artery disease include dyslipidemia. Past treatments include calcium channel blockers. The current treatment provides moderate improvement. Compliance problems include psychosocial issues. Past Medical History:   Diagnosis Date    Asthma     CVA (cerebral vascular accident) ([facility] [address]     Hypertension     Kidney stone         Current Outpatient Medications   Medication Sig Dispense Refill    atorvastatin (LIPITOR) 40 MG tablet Take 1 tablet by mouth nightly 390 tablet 0    amLODIPine (NORVASC) 10 MG tablet Take 1 tablet by mouth daily 30 tablet 0    promethazine (PHENERGAN) 12.5 MG tablet Take 1 tablet by mouth every 6 hours as needed for Nausea 15 tablet 0    tiZANidine (ZANAFLEX) 2 MG tablet Take 1 tablet by mouth every 8 hours as needed (back pain) 15 tablet 0    Cholecalciferol (VITAMIN D3) 50 MCG (2000 UT) CAPS take 1 capsule by mouth once daily 30 capsule 2    acetaminophen (TYLENOL) 325 MG tablet Take 2 tablets by mouth every 6 hours as needed for Pain 60 tablet 0    ibuprofen (ADVIL;MOTRIN) 200 MG tablet Take 2 tablets by mouth every 6 hours as needed for Pain or Fever 60 tablet 0    ASPIRIN LOW DOSE 81 MG EC tablet take 1 tablet by mouth once daily 90 tablet 0    triamcinolone (KENALOG) 0.025 % ointment apply to affected area twice a day TO RASH FOR UP TO 2 WEEKS. 15 g 0    ciclopirox (PENLAC) 8 % solution Apply topically nightly. Remove once weekly with alcohol or nail polish remover.  1 Bottle 3    SUMAtriptan (IMITREX) 25 MG tablet Take 0.5-1 tablets by mouth once as needed for Migraine (Patient not taking: Reported on 6/3/2021) 7 tablet 0     No current facility-administered medications for this visit. Allergies   Allergen Reactions    Sulfa Antibiotics Itching       Health Maintenance   Topic Date Due    Hepatitis C screen  Never done    COVID-19 Vaccine (1) Never done    Pneumococcal 0-64 years Vaccine (1 of 2 - PPSV23) Never done    Breast cancer screen  Never done    Cervical cancer screen  07/05/2020    Flu vaccine (1) 09/01/2021    Lipid screen  10/22/2021    Potassium monitoring  11/22/2021    Creatinine monitoring  11/22/2021    Shingles Vaccine (1 of 2) 03/23/2022 (Originally 7/3/2016)    Diabetes screen  07/26/2022    Colon cancer screen colonoscopy  03/20/2023    DTaP/Tdap/Td vaccine (3 - Td or Tdap) 04/12/2023    Hepatitis A vaccine  Aged Out    Hepatitis B vaccine  Aged Out    Hib vaccine  Aged Out    Meningococcal (ACWY) vaccine  Aged Out    HIV screen  Discontinued       Subjective:      Review of Systems   All other systems reviewed and are negative. Objective:     Physical Exam  Vitals reviewed. Constitutional:       Appearance: Normal appearance. HENT:      Head: Normocephalic and atraumatic. Skin:     General: Skin is warm and dry. Neurological:      General: No focal deficit present. Mental Status: She is alert and oriented to person, place, and time. Psychiatric:         Mood and Affect: Mood normal.         Behavior: Behavior normal.       BP (!) 147/98   Pulse 85   Temp 97 °F (36.1 °C) (Temporal)   SpO2 97%       Assessment:       Diagnosis Orders   1. Essential hypertension  amLODIPine (NORVASC) 10 MG tablet   2. Medication refill  amLODIPine (NORVASC) 10 MG tablet       Plan:      No follow-ups on file.     Orders Placed This Encounter   Medications    atorvastatin (LIPITOR) 40 MG tablet     Sig: Take 1 tablet by mouth nightly     Dispense:  390 tablet     Refill: 0    amLODIPine (NORVASC) 10 MG tablet     Sig: Take 1 tablet by mouth daily     Dispense:  30 tablet     Refill:  0    promethazine (PHENERGAN) 12.5 MG tablet     Sig: Take 1 tablet by mouth every 6 hours as needed for Nausea     Dispense:  15 tablet     Refill:  0     No orders of the defined types were placed in this encounter. Patient given educational materials - see patient instructions. Discussed use, benefit, and side effects of prescribed medications. All patientquestions answered. Pt voiced understanding.     Electronically signed by Dhruv Kohler MD on 12/30/2021at 10:21 AM

## 2022-02-02 ENCOUNTER — VIRTUAL VISIT (OUTPATIENT)
Dept: PRIMARY CARE CLINIC | Age: 57
End: 2022-02-02
Payer: COMMERCIAL

## 2022-02-02 DIAGNOSIS — Z76.0 MEDICATION REFILL: ICD-10-CM

## 2022-02-02 DIAGNOSIS — I10 ESSENTIAL HYPERTENSION: Primary | ICD-10-CM

## 2022-02-02 DIAGNOSIS — R11.2 INTRACTABLE VOMITING WITH NAUSEA, UNSPECIFIED VOMITING TYPE: ICD-10-CM

## 2022-02-02 DIAGNOSIS — G43.001 MIGRAINE WITHOUT AURA AND WITH STATUS MIGRAINOSUS, NOT INTRACTABLE: ICD-10-CM

## 2022-02-02 DIAGNOSIS — E78.5 DYSLIPIDEMIA: ICD-10-CM

## 2022-02-02 PROCEDURE — 99214 OFFICE O/P EST MOD 30 MIN: CPT | Performed by: STUDENT IN AN ORGANIZED HEALTH CARE EDUCATION/TRAINING PROGRAM

## 2022-02-02 RX ORDER — ACETAMINOPHEN 160 MG
TABLET,DISINTEGRATING ORAL
Qty: 30 CAPSULE | Refills: 2 | Status: SHIPPED | OUTPATIENT
Start: 2022-02-02 | End: 2022-06-07

## 2022-02-02 RX ORDER — AMLODIPINE BESYLATE 10 MG/1
10 TABLET ORAL DAILY
Qty: 30 TABLET | Refills: 0 | Status: SHIPPED | OUTPATIENT
Start: 2022-02-02 | End: 2022-03-08 | Stop reason: SDUPTHER

## 2022-02-02 RX ORDER — ATORVASTATIN CALCIUM 40 MG/1
40 TABLET, FILM COATED ORAL NIGHTLY
Qty: 390 TABLET | Refills: 0 | Status: SHIPPED | OUTPATIENT
Start: 2022-02-02

## 2022-02-02 SDOH — HEALTH STABILITY: PHYSICAL HEALTH: ON AVERAGE, HOW MANY DAYS PER WEEK DO YOU ENGAGE IN MODERATE TO STRENUOUS EXERCISE (LIKE A BRISK WALK)?: 5 DAYS

## 2022-02-02 SDOH — HEALTH STABILITY: PHYSICAL HEALTH: ON AVERAGE, HOW MANY MINUTES DO YOU ENGAGE IN EXERCISE AT THIS LEVEL?: 70 MIN

## 2022-02-02 NOTE — PROGRESS NOTES
Steve Rodriguez (:  1965) is a est pt here for evaluation of the following:    Assessment & Plan   Below is the assessment and plan developed based on review of pertinent history, physical exam, labs, studies, and medications. 1. Essential hypertension  -     amLODIPine (NORVASC) 10 MG tablet; Take 1 tablet by mouth daily, Disp-30 tablet, R-0Normal  2. Medication refill  -     amLODIPine (NORVASC) 10 MG tablet; Take 1 tablet by mouth daily, Disp-30 tablet, R-0Normal  -     Cholecalciferol (VITAMIN D3) 50 MCG ( UT) CAPS; take 1 capsule by mouth once daily, Disp-30 capsule, R-2Normal  3. Dyslipidemia  -     atorvastatin (LIPITOR) 40 MG tablet; Take 1 tablet by mouth nightly, Disp-390 tablet, R-0Normal  4. Migraine without aura and with status migrainosus, not intractable  5. Intractable vomiting with nausea, unspecified vomiting type  -     H. Pylori Breath Test; Future    Presents for medication refill  She does have intractable nausea that is intermittent she takes Phenergan for this  Has not been explored  H. pylori would make sense for testing purposes  She has not had an EGD  She did have a colonoscopy in 2018 there was a 5 mm polyp in the sigmoid colon  Updated screening guidelines recommend 7 to 10-year surveillance  This would mean she needs a repeat in  we discussed this she understands  Her blood pressure is well controlled on amlodipine 10 mg  She also takes vitamin D daily 2000 units  She also takes Lipitor 40 mg these were all refilled  She has greater than 2 chronic health issues and we are managing her medications hence need for level of service coding    No follow-ups on file.        Subjective   HPI: 60-year-old female with hypertension dyslipidemia and low vitamin D presents for follow-up and medication refill  She is up-to-date on her colonoscopy screening as outlined in the assessment and plan  She has no other health concerns can be seen every 6 to 12 months  Review of Systems Pertinent positives and negatives included in HPI 14 point ROS otherwise negative      Objective   Patient-Reported Vitals  No data recorded                  Lazaro Greer, was evaluated through a synchronous (real-time) audio-video encounter. The patient (or guardian if applicable) is aware that this is a billable service, which includes applicable co-pays. This Virtual Visit was conducted with patient's (and/or legal guardian's) consent. The visit was conducted pursuant to the emergency declaration under the [address] [place] [address] authority and the [employer] General Act. Patient identification was verified, and a caregiver was present when appropriate. The patient was located at home in a state where the provider was licensed to provide care.        --Jocelyne Wright MD

## 2022-02-04 ENCOUNTER — HOSPITAL ENCOUNTER (EMERGENCY)
Age: 57
Discharge: HOME | End: 2022-02-04
Attending: EMERGENCY MEDICINE
Payer: COMMERCIAL

## 2022-02-04 VITALS
SYSTOLIC BLOOD PRESSURE: 139 MMHG | DIASTOLIC BLOOD PRESSURE: 93 MMHG | HEART RATE: 81 BPM | TEMPERATURE: 98.3 F | RESPIRATION RATE: 18 BRPM | OXYGEN SATURATION: 99 % | WEIGHT: 175 LBS | BODY MASS INDEX: 29.16 KG/M2 | HEIGHT: 65 IN

## 2022-02-04 DIAGNOSIS — N90.89 LABIAL LESION: Primary | ICD-10-CM

## 2022-02-04 PROCEDURE — 99283 EMERGENCY DEPT VISIT LOW MDM: CPT

## 2022-02-04 PROCEDURE — 87529 HSV DNA AMP PROBE: CPT

## 2022-02-04 ASSESSMENT — ENCOUNTER SYMPTOMS
COLOR CHANGE: 0
ABDOMINAL PAIN: 0

## 2022-02-04 ASSESSMENT — PAIN DESCRIPTION - LOCATION: LOCATION: ABDOMEN

## 2022-02-04 ASSESSMENT — PAIN DESCRIPTION - ORIENTATION: ORIENTATION: LEFT;LOWER

## 2022-02-04 ASSESSMENT — PAIN SCALES - GENERAL: PAINLEVEL_OUTOF10: 3

## 2022-02-04 ASSESSMENT — PAIN DESCRIPTION - DESCRIPTORS: DESCRIPTORS: PRESSURE

## 2022-02-04 NOTE — ED NOTES
Pt to er with c/o rad raised area in labial area. Pt states she noticed s/sx this morning. Pt denies drainage. Pt denies recent fever or chills. Pt a&ox3. Skin warm and dry. Respirations even and non-labored.       Luis Alberto Yang RN  02/04/22 8240

## 2022-02-04 NOTE — ED PROVIDER NOTES
smokeless tobacco. She reports that she does not drink alcohol and does not use drugs. REVIEW OF SYSTEMS    (2-9 systems for level 4, 10 or more for level 5)     Review of Systems   Constitutional: Negative for chills, diaphoresis, fatigue and fever. Gastrointestinal: Negative for abdominal pain. Genitourinary: Positive for genital sores. Musculoskeletal: Negative for arthralgias and myalgias. Skin: Positive for wound. Negative for color change and rash. Neurological: Negative for weakness. Except as noted above the remainder of the review of systems was reviewed and negative. PHYSICAL EXAM    (up to 7 for level 4, 8 or more for level 5)     ED Triage Vitals [02/04/22 1322]   BP Temp Temp Source Pulse Resp SpO2 Height Weight   (!) 139/93 98.3 °F (36.8 °C) Oral 81 18 99 % 5' 5\" (1.651 m) 175 lb (79.4 kg)     Physical Exam  Vitals reviewed. Exam conducted with a chaperone present. Constitutional:       General: She is not in acute distress. Appearance: She is well-developed. She is not diaphoretic. Eyes:      General: No scleral icterus. Conjunctiva/sclera: Conjunctivae normal.   Cardiovascular:      Rate and Rhythm: Normal rate. Pulmonary:      Effort: Pulmonary effort is normal. No respiratory distress. Genitourinary:      Skin:     General: Skin is warm and dry. Findings: No rash. Neurological:      Mental Status: She is alert and oriented to person, place, and time. Psychiatric:         Behavior: Behavior normal.           DIAGNOSTIC RESULTS     LABS:  Labs Reviewed   HERPES SIMPLEX 1 & 2, MOLECULAR       All other labs were within normal range or not returned as of this dictation.     EMERGENCY DEPARTMENT COURSE and DIFFERENTIAL DIAGNOSIS/MDM:   Vitals:    Vitals:    02/04/22 1322   BP: (!) 139/93   Pulse: 81   Resp: 18   Temp: 98.3 °F (36.8 °C)   TempSrc: Oral   SpO2: 99%   Weight: 175 lb (79.4 kg)   Height: 5' 5\" (1.651 m)       CLINICAL DECISION MAKING:  The patient presented alert with a nontoxic appearance and was seen in conjunction with Dr. Parul Suero. Herpes culture is pending. Follow up with pcp for a recheck, further evaluation and treatment. Evaluation and treatment course in the ED, and plan of care upon discharge was discussed in length with the patient. Patient had no further questions prior to being discharged and was instructed to return to the ED for new or worsening symptoms. Care was provided during an unprecedented national emergency due to the novel coronavirus, Covid-19. FINAL IMPRESSION      1.  Labial lesion            Problem List  Patient Active Problem List   Diagnosis Code    Calculus (=stone) N20.9    Essential hypertension I10    Asthma J45.909    Syncope and collapse R55    Ischemic stroke ([facility] [address] I63.9    Right sided weakness R53.1    Meralgia paresthetica of right side G57.11    Acute right hemiparesis (HCC) G81.91    Paresthesias R20.2    Numbness R20.0    Hypercholesteremia E78.00    Chest pain R07.9    Closed fracture of lower end of left radius with routine healing S52.502D    Establishing care with new doctor, encounter for Z76.89         DISPOSITION/PLAN   DISPOSITION Decision To Discharge 02/04/2022 01:45:09 PM      PATIENT REFERRED TO:   JESIKA Reddy CNP  [address]  [phone]    Schedule an appointment as soon as possible for a visit       [facility] ED  [address] [facility]  [phone]          DISCHARGE MEDICATIONS:     Discharge Medication List as of 2/4/2022  1:54 PM              (Please note that portions of this note were completed with a voice recognition program.  Efforts were made to edit the dictations but occasionally words are mis-transcribed.)    JESIKA Rebolledo - JESIKA Rasheed CNP  02/04/22 0730

## 2022-02-05 LAB
HSV 1, NAAT: NEGATIVE
HSV 2, NAAT: POSITIVE
SPECIMEN DESCRIPTION: ABNORMAL

## 2022-02-09 NOTE — ED PROVIDER NOTES
eMERGENCY dEPARTMENT eNCOUnter   Independent Attestation     Pt Name: Steve Rodriguez  MRN: [id number]  [place] 1965  Date of evaluation: 2/9/22     Steve Rodriguez is a 55 y.o. female with CC: Other (Lump on LLQ, Pt found lump this morning)        This visit was performed by both a physician and an APC. I performed all aspects of the MDM as documented. The care is provided during an unprecedented national emergency due to the novel coronavirus, COVID 19.     Heena Shukla MD  Attending Emergency Physician         Cyrena Snellen, MD  02/09/22 4434

## 2022-02-11 ENCOUNTER — HOSPITAL ENCOUNTER (OUTPATIENT)
Dept: MRI IMAGING | Age: 57
Discharge: HOME | End: 2022-02-13
Payer: COMMERCIAL

## 2022-02-11 DIAGNOSIS — S49.80XA OTHER SPECIFIED INJURIES OF SHOULDER AND UPPER ARM, UNSPECIFIED ARM, INITIAL ENCOUNTER: ICD-10-CM

## 2022-02-11 PROCEDURE — 73221 MRI JOINT UPR EXTREM W/O DYE: CPT

## 2022-02-14 ENCOUNTER — PROCEDURE VISIT (OUTPATIENT)
Dept: PODIATRY | Age: 57
End: 2022-02-14
Payer: COMMERCIAL

## 2022-02-14 VITALS — RESPIRATION RATE: 16 BRPM | HEIGHT: 65 IN | WEIGHT: 175 LBS | BODY MASS INDEX: 29.16 KG/M2

## 2022-02-14 DIAGNOSIS — M79.605 PAIN IN BOTH LOWER EXTREMITIES: ICD-10-CM

## 2022-02-14 DIAGNOSIS — L60.0 INGROWN NAIL: Primary | ICD-10-CM

## 2022-02-14 DIAGNOSIS — M79.604 PAIN IN BOTH LOWER EXTREMITIES: ICD-10-CM

## 2022-02-14 PROCEDURE — 11730 AVULSION NAIL PLATE SIMPLE 1: CPT | Performed by: PODIATRIST

## 2022-02-14 PROCEDURE — 99214 OFFICE O/P EST MOD 30 MIN: CPT | Performed by: PODIATRIST

## 2022-02-14 NOTE — PROGRESS NOTES
[address] [facility] PODIATRY [facility]  [address] [place] [address]  Dept: [phone]  Dept Fax: [phone]    RETURN PATIENT PROGRESS NOTE  Date of patient's visit: 2/14/2022  Patient's Name:  Steve Rodriguez YOB: 1965            Patient Care Team:  JESIKA Zavala CNP as PCP - General (Certified Nurse Practitioner)  JESIKA Zavala CNP as PCP - [facility]  Rajiv Maria DPM as Physician ([facility]ings[place])  Joseluis Griffiths MD as Consulting Physician (Gastroenterology)       Steve Rodriguez 55 y.o. female that presents for follow-up of   Chief Complaint   Patient presents with    Ingrown Toenail    Toe Pain     Pt's primary care physician is JESIKA Zavala CNP last seen 02/02/2022  Symptoms began 2 year(s) ago and are unchanged . Patient relates pain is Present to right great toe. Pain is rated 1 out of 10 and is described as intermittent, mild. Treatments prior to today's visit include: none. Currently denies F/C/N/V. Allergies   Allergen Reactions    Sulfa Antibiotics Itching       Past Medical History:   Diagnosis Date    Asthma     CVA (cerebral vascular accident) ([facility] [address]     Hypertension     Kidney stone        Prior to Admission medications    Medication Sig Start Date End Date Taking? Authorizing Provider   amLODIPine (NORVASC) 10 MG tablet Take 1 tablet by mouth daily 2/2/22  Yes Perez Yeager MD   atorvastatin (LIPITOR) 40 MG tablet Take 1 tablet by mouth nightly 2/2/22  Yes Perez Yeager MD   Cholecalciferol (VITAMIN D3) 50 MCG (2000 UT) CAPS take 1 capsule by mouth once daily 2/2/22  Yes Perez Yeager MD   promethazine (PHENERGAN) 12.5 MG tablet Take 1 tablet by mouth every 6 hours as needed for Nausea 12/30/21  Yes Murtaza Giraldo MD   triamcinolone (KENALOG) 0.025 % ointment apply to affected area twice a day TO RASH FOR UP TO 2 WEEKS.  10/10/19  Yes Martha Pace JESIKA Gupta - CNP       Review of Systems    Review of Systems:  History obtained from chart review and the patient  General ROS: negative for - chills, fatigue, fever, night sweats or weight gain  Constitutional: Negative for chills, diaphoresis, fatigue, fever and unexpected weight change. Musculoskeletal: Positive for arthralgias, gait problem and joint swelling. Neurological ROS: negative for - behavioral changes, confusion, headaches or seizures. Negative for weakness and numbness. Dermatological ROS: negative for - mole changes, rash  Cardiovascular: Negative for leg swelling. Gastrointestinal: Negative for constipation, diarrhea, nausea and vomiting. Lower Extremity Physical Examination:     Vitals:   Vitals:    02/14/22 0844   Resp: 16     General: AAO x 3 in NAD. Dermatologic Exam:  Right hallux nail is incurvated with increased edema. Musculoskeletal:     1st MPJ ROM decreased, Bilateral.  Muscle strength 5/5, Bilateral.  Pain present upon palpation of right hallux. Medial longitudinal arch, Bilateral WNL. Ankle ROM WNL,Bilateral.    Dorsally contracted digits absent digits 1-5 Bilateral.     Vascular: DP and PT pulses palpable 2/4, Bilateral.  CFT <3 seconds, Bilateral.  Hair growth present to the level of the digits, Bilateral.  Edema absent, Bilateral.  Varicosities absent, Bilateral. Erythema absent, Bilateral    Neurological: Sensation intact to light touch to level of digits, Bilateral.  Protective sensation intact 10/10 sites via 5.07/10g [place] Monofilament, Bilateral.  negative Tinel's, Bilateral.  negative Valleix sign, Bilateral.      Integument: Warm, dry, supple, Bilateral.  Open lesion absent, Bilateral.  Interdigital maceration absent to web spaces 1-4, Bilateral.    Fissures absent, Bilateral.       Asessment: Patient is a 55 y.o. female with:    Diagnosis Orders   1. Ingrown nail  90250 - [place] REMOVAL OF NAIL PLATE   2.  Pain in both lower extremities  85929 - [place] REMOVAL OF NAIL PLATE       Plan: Patient examined and evaluated. Current condition and treatment options discussed in detail. Verbal consent obtained for nail avulsion after all questions answered. Local anesthesia obtained via Hallux block to the Right hallux utilizing 5 cc of 1% Lidocaine plain. Next the Right hallux was prepped with Betadine. A digital tourniquet was applied. A freer elevator was used to free the total nail border. An english anvil was used to remove the offending border in total.  A curette was used to ensure the offending border was free of any remaining nail. Triple antibiotic ointment was applied to the nail border followed by a semi-compressive dressing. The patient was instructed to leave this dressing clean/dry/intact until the following am at which point they will begin warm epsom salt soaks followed by application of antibiotic ointment and Band-Aid BID. Patient instructed to take Tylenol PRN pain. Post-operative chemical matrixectomy instruction sheet dispensed to patient. Verbal and written instructions given to patient. Contact office with any questions/problems/concerns. No orders of the defined types were placed in this encounter. No orders of the defined types were placed in this encounter. RTC in 1week(s).     2/14/2022      Electronically signed by Vaishali Roblero DPM on 2/14/2022 at 8:45 AM  2/14/2022

## 2022-02-21 ENCOUNTER — OFFICE VISIT (OUTPATIENT)
Dept: PODIATRY | Age: 57
End: 2022-02-21

## 2022-02-21 VITALS — WEIGHT: 175 LBS | RESPIRATION RATE: 16 BRPM | BODY MASS INDEX: 29.16 KG/M2 | HEIGHT: 65 IN

## 2022-02-21 DIAGNOSIS — Z98.890 POST-OPERATIVE STATE: Primary | ICD-10-CM

## 2022-02-21 PROCEDURE — 99024 POSTOP FOLLOW-UP VISIT: CPT | Performed by: PODIATRIST

## 2022-02-21 NOTE — PROGRESS NOTES
[address] [facility] PODIATRY [facility]  [address] [place] [address]  Dept: [phone]  Dept Fax: [phone]    POST-OP PROGRESS NOTE  Date of patient's visit: 2/21/2022  Patient's Name:  Leonel Faria YOB: 1965            Patient Care Team:  JESIKA Vale CNP as PCP - General (Certified Nurse Practitioner)  JESIKA Vale CNP as PCP - [facility] Provider  Keerthi Valdovinos DPM as Physician (Podiatry)  Tatyana Kaplan MD as Consulting Physician (Gastroenterology)        Chief Complaint   Patient presents with    Post-Op Check    Ingrown Toenail       Pt's primary care physician is JESIKA Vale CNP last seen 02/02/2022     Subjective: Leonel Faria is a 55 y.o. female who presents to the office today 1week(s)  S/P left great toe nail avlusion   Problem List Items Addressed This Visit     None      . Patient relates pain is Absent  and improved. Pain is rated 0 out of 10 and is described as none. Currently denies F/C/N/V. Physical Examination:   Minimal bleeding post operatively. Edema present. No erythema. No Pus. Operative correction is satisfactory. Assessment: Leonel Faria is status post as above  Normal post operative course. Doing well        No diagnosis found. Plan:  Patient examined and evaluated. Current condition and treatment options discussed in detail. Advised pt to soak once daily. Monitor for infection. Verbal and written instructions given to patient. Orders: No orders of the defined types were placed in this encounter. Contact office with any questions/problems/concerns. RTC in 1 month(s).      Electronically signed by Keerthi Valdovinos DPM on 2/21/2022 at 9:21 AM  2/21/2022

## 2022-03-07 ENCOUNTER — TELEPHONE (OUTPATIENT)
Dept: FAMILY MEDICINE CLINIC | Age: 57
End: 2022-03-07

## 2022-03-07 DIAGNOSIS — I10 ESSENTIAL HYPERTENSION: ICD-10-CM

## 2022-03-07 DIAGNOSIS — Z76.0 MEDICATION REFILL: ICD-10-CM

## 2022-03-07 NOTE — TELEPHONE ENCOUNTER
----- Message from Omar Quigley sent at 3/7/2022  4:09 PM EST -----  Subject: Refill Request    QUESTIONS  Name of Medication? amLODIPine (NORVASC) 10 MG tablet  Patient-reported dosage and instructions? 1 tablet at bedtime  How many days do you have left? 0  Preferred Pharmacy? [address] [facility] phone number (if available)? [phone]  Additional Information for Provider? Pt would like additional refills if   possible or maybe a 90ds. Pt is out as of tonight.   ---------------------------------------------------------------------------  --------------  CALL BACK INFO  What is the best way for the office to contact you? OK to leave message on   voicemail  Preferred Call Back Phone Number?  [phone]

## 2022-03-08 ENCOUNTER — TELEPHONE (OUTPATIENT)
Dept: FAMILY MEDICINE CLINIC | Age: 57
End: 2022-03-08

## 2022-03-08 DIAGNOSIS — I10 ESSENTIAL HYPERTENSION: ICD-10-CM

## 2022-03-08 DIAGNOSIS — Z76.0 MEDICATION REFILL: ICD-10-CM

## 2022-03-08 RX ORDER — AMLODIPINE BESYLATE 10 MG/1
10 TABLET ORAL DAILY
Qty: 90 TABLET | Refills: 0 | Status: CANCELLED | OUTPATIENT
Start: 2022-03-08

## 2022-03-08 RX ORDER — AMLODIPINE BESYLATE 10 MG/1
10 TABLET ORAL DAILY
Qty: 90 TABLET | Refills: 0 | Status: SHIPPED | OUTPATIENT
Start: 2022-03-08 | End: 2022-06-07

## 2022-03-08 NOTE — TELEPHONE ENCOUNTER
----- Message from Jennifer Bragg sent at 3/8/2022  2:15 PM EST -----  Subject: Message to Provider    QUESTIONS  Information for Provider? pt. would like to get a 90 day supply of her   amlodipine 10 mg. Rx in the future. Please call pt. and advise.  ---------------------------------------------------------------------------  --------------  CALL BACK INFO  What is the best way for the office to contact you? OK to leave message on   voicemail  Preferred Call Back Phone Number? [phone]  ---------------------------------------------------------------------------  --------------  SCRIPT ANSWERS  Relationship to Patient?  Self

## 2022-04-05 RX ORDER — PROMETHAZINE HYDROCHLORIDE 12.5 MG/1
TABLET ORAL
Qty: 15 TABLET | Refills: 0 | OUTPATIENT
Start: 2022-04-05

## 2022-04-13 ENCOUNTER — TELEPHONE (OUTPATIENT)
Dept: FAMILY MEDICINE CLINIC | Age: 57
End: 2022-04-13

## 2022-04-13 NOTE — TELEPHONE ENCOUNTER
----- Message from Pepe Wei sent at 4/13/2022  9:37 AM EDT -----  Subject: Appointment Request    Reason for Call: Semi-Routine Skin Problem    QUESTIONS  Type of Appointment? Established Patient  Reason for appointment request? Available appointments did not meet   patient need  Additional Information for Provider? Pt called in stated has been having   skin issues and want to see if pt could get seen today but there was   nothing availble pt want to see if provider can maybe call in somthing   [place] pt can not come in tomorrow pt has surgery . Screened green   Pharmacy -[facility]   ---------------------------------------------------------------------------  --------------  Adela CASTELLANOS  What is the best way for the office to contact you? OK to leave message on   voicemail  Preferred Call Back Phone Number? [phone]  ---------------------------------------------------------------------------  --------------  SCRIPT ANSWERS  Relationship to Patient? Self  Are you having swelling in your throat or face? No  Are you having difficulty breathing? No  Have the symptoms worsened or spread in the last day? No  Are you having fevers (100.4), chills or sweats? No  Have you recently (14 days) seen a provider for this issue? No  Have you been diagnosed with, awaiting test results for, or told that you   are suspected of having COVID-19 (Coronavirus)? (If patient has tested   negative or was tested as a requirement for work, school, or travel and   not based on symptoms, answer no)? No  Within the past 10 days have you developed any of the following symptoms   (answer no if symptoms have been present longer than 10 days or began   more than 10 days ago)?  Fever or Chills, Cough, Shortness of breath or   difficulty breathing, Loss of taste or smell, Sore throat, Nasal   congestion, Sneezing or runny nose, Fatigue or generalized body aches   (answer no if pain is specific to a body part e.g. back pain), Diarrhea, Headache? No  Have you had close contact with someone with COVID-19 in the last 7 days? No  (Service Expert  click yes below to proceed with [facility] As Usual   Scheduling)?  Yes

## 2022-05-20 ENCOUNTER — OFFICE VISIT (OUTPATIENT)
Dept: PRIMARY CARE CLINIC | Age: 57
End: 2022-05-20
Payer: MEDICAID

## 2022-05-20 VITALS
SYSTOLIC BLOOD PRESSURE: 123 MMHG | OXYGEN SATURATION: 97 % | TEMPERATURE: 97.9 F | DIASTOLIC BLOOD PRESSURE: 87 MMHG | HEART RATE: 84 BPM

## 2022-05-20 DIAGNOSIS — T78.40XA ALLERGIC REACTION, INITIAL ENCOUNTER: Primary | ICD-10-CM

## 2022-05-20 PROCEDURE — 99213 OFFICE O/P EST LOW 20 MIN: CPT | Performed by: NURSE PRACTITIONER

## 2022-05-20 RX ORDER — CETIRIZINE HYDROCHLORIDE 10 MG/1
10 TABLET ORAL DAILY
Qty: 30 TABLET | Refills: 0 | Status: SHIPPED | OUTPATIENT
Start: 2022-05-20

## 2022-05-20 SDOH — ECONOMIC STABILITY: FOOD INSECURITY: WITHIN THE PAST 12 MONTHS, YOU WORRIED THAT YOUR FOOD WOULD RUN OUT BEFORE YOU GOT MONEY TO BUY MORE.: NEVER TRUE

## 2022-05-20 SDOH — ECONOMIC STABILITY: FOOD INSECURITY: WITHIN THE PAST 12 MONTHS, THE FOOD YOU BOUGHT JUST DIDN'T LAST AND YOU DIDN'T HAVE MONEY TO GET MORE.: NEVER TRUE

## 2022-05-20 ASSESSMENT — ENCOUNTER SYMPTOMS
CHOKING: 0
SORE THROAT: 0
SINUS PRESSURE: 0
SHORTNESS OF BREATH: 0
COUGH: 0
TROUBLE SWALLOWING: 0
WHEEZING: 0
VOICE CHANGE: 0

## 2022-05-20 ASSESSMENT — PATIENT HEALTH QUESTIONNAIRE - PHQ9
SUM OF ALL RESPONSES TO PHQ QUESTIONS 1-9: 0
2. FEELING DOWN, DEPRESSED OR HOPELESS: 0
SUM OF ALL RESPONSES TO PHQ9 QUESTIONS 1 & 2: 0
SUM OF ALL RESPONSES TO PHQ QUESTIONS 1-9: 0
1. LITTLE INTEREST OR PLEASURE IN DOING THINGS: 0

## 2022-05-20 ASSESSMENT — SOCIAL DETERMINANTS OF HEALTH (SDOH): HOW HARD IS IT FOR YOU TO PAY FOR THE VERY BASICS LIKE FOOD, HOUSING, MEDICAL CARE, AND HEATING?: NOT HARD AT ALL

## 2022-05-20 NOTE — PROGRESS NOTES
[facility]  [id number] [facility] [address]  Dept: [phone]  Dept Fax: [phone]    Patient Care Team:  Pat Palmer MD as PCP - General (Internal Medicine)  Pat Palmer MD as PCP - [facility]  Sania Murdock DPM as Physician (Podiatry)  Tessa Cheng MD as Consulting Physician (Gastroenterology)    2022     Satish Nelson (:  [phone])YH a 55 y.o. female, here for evaluation of the following medical concerns:   Chief Complaint   Patient presents with    Skin Problem     Facial itching, x1week       1 week of itching to face bilateral cheeks. Denies new medications, no new facial washes or products. Did get a new puppy on Mothers day. No topical products, no fleas noticed. No swelling of the face, no trouble swallowing  Did not see a rash    . Review of Systems   Constitutional: Negative for chills, fatigue and fever. HENT: Negative for drooling, sinus pressure, sore throat, trouble swallowing and voice change. Respiratory: Negative for cough, choking, shortness of breath and wheezing. Skin: Negative for rash. Prior to Visit Medications    Medication Sig Taking? Authorizing Provider   cetirizine (ZYRTEC) 10 MG tablet Take 1 tablet by mouth daily Yes JESIKA Wilks CNP   hydrocortisone 2.5 % cream Apply topically 2 times daily.  Yes JESIKA Wilks CNP   amLODIPine (NORVASC) 10 MG tablet Take 1 tablet by mouth daily Yes Pat Palmer MD   atorvastatin (LIPITOR) 40 MG tablet Take 1 tablet by mouth nightly Yes Pat Palmer MD   Cholecalciferol (VITAMIN D3) 50 MCG ( UT) CAPS take 1 capsule by mouth once daily Yes Pat Palmer MD   promethazine (PHENERGAN) 12.5 MG tablet Take 1 tablet by mouth every 6 hours as needed for Nausea Yes Mari Mayorga MD   triamcinolone (KENALOG) 0.025 % ointment apply to affected area twice a day TO RASH FOR UP TO 2 WEEKS. Patient not taking: Reported on 5/20/2022  JESIKA Peters - CNP        Social History     Tobacco Use    Smoking status: Never Smoker    Smokeless tobacco: Never Used   Substance Use Topics    Alcohol use: No        Vitals:    05/20/22 0858 05/20/22 0924   BP: (!) 135/90 123/87   Site: Right Upper Arm    Position: Sitting    Pulse: 84    Temp: 97.9 °F (36.6 °C)    TempSrc: Infrared    SpO2: 97%      Estimated body mass index is 29.12 kg/m² as calculated from the following:    Height as of 2/21/22: 5' 5\" (1.651 m). Weight as of 2/21/22: 175 lb (79.4 kg). DIAGNOSTIC FINDINGS:  CBC:  Lab Results   Component Value Date    WBC 4.4 11/22/2020    HGB 13.1 11/22/2020    PLT See Reflexed IPF Result 11/22/2020       BMP:    Lab Results   Component Value Date     11/22/2020    K 4.3 11/22/2020     11/22/2020    CO2 23 11/22/2020    BUN 11 11/22/2020    CREATININE 0.97 11/22/2020    GLUCOSE 103 11/22/2020       HEMOGLOBIN A1C:   Lab Results   Component Value Date    LABA1C 4.8 08/22/2018       FASTING LIPID PANEL:  Lab Results   Component Value Date    CHOL 251 (H) 10/22/2020    HDL 75 10/22/2020    TRIG 117 10/22/2020       Physical Exam  Constitutional:       Appearance: Normal appearance. She is not ill-appearing or toxic-appearing. HENT:      Head: Normocephalic. Right Ear: External ear normal.      Left Ear: External ear normal.      Mouth/Throat:      Mouth: Mucous membranes are moist.      Pharynx: No posterior oropharyngeal erythema. Eyes:      General: No scleral icterus. Right eye: No discharge. Left eye: No discharge. Pupils: Pupils are equal, round, and reactive to light. Musculoskeletal:      Cervical back: Normal range of motion and neck supple. Skin:     Findings: Rash present. Rash is scaling. Rash is not macular, papular or vesicular. Comments: Scaly, flaky skin to bilateral mandible region.   Does not extend to maxillary region or frontal sinus. Also does not extend to neck. No visible abscess or wound. Hyperpigmented skin is present in similar location, appears secondary to previous acne scarring   Neurological:      Mental Status: She is alert. ASSESSMENT     Diagnosis Orders   1. Allergic reaction, initial encounter  cetirizine (ZYRTEC) 10 MG tablet    hydrocortisone 2.5 % cream          PLAN:  Orders Placed This Encounter   Medications    cetirizine (ZYRTEC) 10 MG tablet     Sig: Take 1 tablet by mouth daily     Dispense:  30 tablet     Refill:  0    hydrocortisone 2.5 % cream     Sig: Apply topically 2 times daily. Dispense:  20 g     Refill:  0         1. Discussed possible recent allergic reaction. Unknown cause at this time, new exposure to puppy however patient does have hypoallergenic breed. Encouraged oral and histamine for 2 weeks taken at night and may use topical steroid low potent along with topical Benadryl for itching. FOLLOW UP AND INSTRUCTIONS:  Return if symptoms worsen or fail to improve. · Bogdan Le received counseling on the following healthy behaviors:medication adherence    · Discussed use, benefit, and side effects of prescribed medications. Barriers to  medication compliance addressed. All patient questions answered. Pt  verbalized understanding of all instructions given. · Patient given educational materials - see patient instructions      · Patient advised to contact scheduling offices for any referrals or imaging orders  placed today if they have not been contacted in 48 hours. Return if symptoms worsen or fail to improve. An electronic signature was used to authenticate this note. --Kym Cabot, APRN - CNP on 5/20/2022 at 9:52 AM  Visit Information    Have you changed or started any medications since your last visit including any over-the-counter medicines, vitamins, or herbal medicines?  no   Are you having any side effects from any of your medications? -  no  Have you stopped taking any of your medications? Is so, why? -  no    Have you seen any other physician or provider since your last visit? No  Have you had any other diagnostic tests since your last visit? No  Have you been seen in the emergency room and/or had an admission to a hospital since we last saw you? No  Have you had your routine dental cleaning in the past 6 months? no    Have you activated your MyChart account? If not, what are your barriers?  Yes     Patient Care Team:  Debbi Mcdonald MD as PCP - General (Internal Medicine)  Debbi Mcdonald MD as PCP [facility]  Janina Shepherd DPM as Physician (Podiatry)  Jennifer Abreu MD as Consulting Physician (Gastroenterology)    Medical History Review  Past Medical, Family, and Social History reviewed and does not contribute to the patient presenting condition    Health Maintenance   Topic Date Due    COVID-19 Vaccine (1) Never done    Pneumococcal 0-64 years Vaccine (1 - PCV) Never done    Hepatitis C screen  Never done    Breast cancer screen  Never done    Shingles vaccine (1 of 2) Never done    Cervical cancer screen  07/05/2020    Lipids  10/22/2021    Diabetes screen  07/26/2022    Flu vaccine (Season Ended) 09/01/2022    Colorectal Cancer Screen  03/20/2023    DTaP/Tdap/Td vaccine (3 - Td or Tdap) 04/12/2023    Depression Screen  05/20/2023    Hepatitis A vaccine  Aged Out    Hepatitis B vaccine  Aged Out    Hib vaccine  Aged Out    Meningococcal (ACWY) vaccine  Aged Out    HIV screen  Discontinued

## 2022-05-24 ENCOUNTER — NURSE ONLY (OUTPATIENT)
Dept: PRIMARY CARE CLINIC | Age: 57
End: 2022-05-24

## 2022-05-24 DIAGNOSIS — Z11.1 ENCOUNTER FOR PPD SKIN TEST READING: Primary | ICD-10-CM

## 2022-05-24 PROCEDURE — 86580 TB INTRADERMAL TEST: CPT | Performed by: INTERNAL MEDICINE

## 2022-05-24 NOTE — PROGRESS NOTES
PPD Reading Note  PPD reada nd results entered in [address].     If test not read within 48-72 hours of initial placement, patient advised to repeat in other arm 1-3 weeks after this test.  Allergic reaction: no

## 2022-05-26 ENCOUNTER — NURSE ONLY (OUTPATIENT)
Dept: PRIMARY CARE CLINIC | Age: 57
End: 2022-05-26

## 2022-05-26 DIAGNOSIS — Z11.1 ENCOUNTER FOR PPD SKIN TEST READING: Primary | ICD-10-CM

## 2022-05-26 NOTE — PROGRESS NOTES
PPD Reading Note  PPD read and results entered in [address]. Result: 0 mm induration.   Interpretation: negative  If test not read within 48-72 hours of initial placement, patient advised to repeat in other arm 1-3 weeks after this test.  Allergic reaction: no

## 2022-05-31 ENCOUNTER — NURSE ONLY (OUTPATIENT)
Dept: PRIMARY CARE CLINIC | Age: 57
End: 2022-05-31

## 2022-05-31 NOTE — PROGRESS NOTES
PPD Placement note  Yvan Verduzco, 55 y.o. female is here today for placement of PPD test, step 2  Reason for PPD test: employment  Pt taken PPD test before: yes  Verified in allergy area and with patient that they are not allergic to the products PPD is made of (Phenol or Tween). Yes   Is patient taking any oral or IV steroid medication now or have they taken it in the last month? no  Has the patient ever received the BCG vaccine?: no  Has the patient been in recent contact with anyone known or suspected of having active TB disease?: no       Date of exposure (if applicable): n/a       Name of person they were exposed to (if applicable): na  Patient's Country of origin?: [place]  O: Alert and oriented in NAD. P:  PPD placed on 5/31/2022. Patient advised to return for reading within 48-72 hours.

## 2022-06-02 ENCOUNTER — NURSE ONLY (OUTPATIENT)
Dept: PRIMARY CARE CLINIC | Age: 57
End: 2022-06-02

## 2022-06-02 NOTE — PROGRESS NOTES
PPD Reading Note  PPD read and results entered in [address]. Result: .00 mm induration.   Interpretation: negative  If test not read within 48-72 hours of initial placement, patient advised to repeat in other arm 1-3 weeks after this test.  Allergic reaction: no

## 2022-06-06 DIAGNOSIS — Z76.0 MEDICATION REFILL: ICD-10-CM

## 2022-06-06 DIAGNOSIS — I10 ESSENTIAL HYPERTENSION: ICD-10-CM

## 2022-06-07 RX ORDER — AMLODIPINE BESYLATE 10 MG/1
TABLET ORAL
Qty: 90 TABLET | Refills: 0 | Status: SHIPPED | OUTPATIENT
Start: 2022-06-07 | End: 2022-09-06

## 2022-06-07 RX ORDER — GLUCOSAMINE/CHONDR SU A SOD 750-600 MG
TABLET ORAL
Qty: 30 CAPSULE | Refills: 2 | Status: SHIPPED | OUTPATIENT
Start: 2022-06-07

## 2022-06-07 NOTE — TELEPHONE ENCOUNTER
6/8/22    Hemoglobin A1C (%)   Date Value   08/22/2018 4.8             ( goal A1C is < 7)   No results found for: LABMICR  LDL Cholesterol (mg/dL)   Date Value   10/22/2020 153 (H)       (goal LDL is <100)   AST (U/L)   Date Value   11/22/2020 26     ALT (U/L)   Date Value   11/22/2020 23     BUN (mg/dL)   Date Value   11/22/2020 11     BP Readings from Last 3 Encounters:   05/20/22 123/87   02/04/22 (!) 139/93   12/30/21 (!) 147/98          (goal 120/80)        Patient Active Problem List:     Calculus (=stone)     Essential hypertension     Asthma     Syncope and collapse     Ischemic stroke (HCC)     Right sided weakness     Meralgia paresthetica of right side     Acute right hemiparesis (HCC)     Paresthesias     Numbness     Hypercholesteremia     Chest pain     Closed fracture of lower end of left radius with routine healing     Establishing care with new doctor, encounter for      ----Mark Cruz

## 2022-09-04 DIAGNOSIS — I10 ESSENTIAL HYPERTENSION: ICD-10-CM

## 2022-09-04 DIAGNOSIS — Z76.0 MEDICATION REFILL: ICD-10-CM

## 2022-09-06 RX ORDER — AMLODIPINE BESYLATE 10 MG/1
TABLET ORAL
Qty: 90 TABLET | Refills: 0 | Status: SHIPPED | OUTPATIENT
Start: 2022-09-06

## 2022-09-06 NOTE — TELEPHONE ENCOUNTER
Health Maintenance   Topic Date Due    COVID-19 Vaccine (1) Never done    Pneumococcal 0-64 years Vaccine (1 - PCV) [birth year]/1972    Hepatitis C screen  Never done    Breast cancer screen  Never done    Shingles vaccine (1 of 2) Never done    Cervical cancer screen  07/05/2020    Lipids  10/22/2021    Diabetes screen  07/26/2022    Flu vaccine (1) 09/01/2022    Colorectal Cancer Screen  03/20/2023    DTaP/Tdap/Td vaccine (3 - Td or Tdap) 04/12/2023    Depression Screen  05/20/2023    Hepatitis A vaccine  Aged Out    Hepatitis B vaccine  Aged Out    Hib vaccine  Aged Out    Meningococcal (ACWY) vaccine  Aged Out    HIV screen  Discontinued             (applicable per patient's age: Cancer Screenings, Depression Screening, Fall Risk Screening, Immunizations)    Hemoglobin A1C (%)   Date Value   08/22/2018 4.8     LDL Cholesterol (mg/dL)   Date Value   10/22/2020 153 (H)     AST (U/L)   Date Value   11/22/2020 26     ALT (U/L)   Date Value   11/22/2020 23     BUN (mg/dL)   Date Value   11/22/2020 11      (goal A1C is < 7)   (goal LDL is <100) need 30-50% reduction from baseline     BP Readings from Last 3 Encounters:   05/20/22 123/87   02/04/22 (!) 139/93   12/30/21 (!) 147/98    (goal /80)      All Future Testing planned in [facility]:  Lab Frequency Next Occurrence   H. Pylori Breath Test Once 05/10/2022       Next Visit Date:  No future appointments.          Patient Active Problem List:     Calculus (=stone)     Essential hypertension     Asthma     Syncope and collapse     Ischemic stroke (HCC)     Right sided weakness     Meralgia paresthetica of right side     Acute right hemiparesis (HCC)     Paresthesias     Numbness     Hypercholesteremia     Chest pain     Closed fracture of lower end of left radius with routine healing     Establishing care with new doctor, encounter for

## 2022-11-16 ENCOUNTER — OFFICE VISIT (OUTPATIENT)
Dept: PRIMARY CARE CLINIC | Age: 57
End: 2022-11-16
Payer: COMMERCIAL

## 2022-11-16 VITALS
BODY MASS INDEX: 29.62 KG/M2 | TEMPERATURE: 97.4 F | SYSTOLIC BLOOD PRESSURE: 134 MMHG | WEIGHT: 178 LBS | HEART RATE: 74 BPM | DIASTOLIC BLOOD PRESSURE: 90 MMHG | OXYGEN SATURATION: 99 %

## 2022-11-16 DIAGNOSIS — L30.9 DERMATITIS, UNSPECIFIED: ICD-10-CM

## 2022-11-16 PROCEDURE — 3078F DIAST BP <80 MM HG: CPT | Performed by: NURSE PRACTITIONER

## 2022-11-16 PROCEDURE — 3074F SYST BP LT 130 MM HG: CPT | Performed by: NURSE PRACTITIONER

## 2022-11-16 PROCEDURE — 99213 OFFICE O/P EST LOW 20 MIN: CPT | Performed by: NURSE PRACTITIONER

## 2022-11-16 RX ORDER — BETAMETHASONE VALERATE 0.1 %
LOTION (ML) TOPICAL
Qty: 1 EACH | Refills: 0 | Status: SHIPPED | OUTPATIENT
Start: 2022-11-16

## 2022-11-16 RX ORDER — CLOTRIMAZOLE AND BETAMETHASONE DIPROPIONATE 10; .64 MG/G; MG/G
CREAM TOPICAL 2 TIMES DAILY
Qty: 45 G | Refills: 0 | Status: SHIPPED | OUTPATIENT
Start: 2022-11-16 | End: 2022-11-16

## 2022-11-16 ASSESSMENT — ENCOUNTER SYMPTOMS
SHORTNESS OF BREATH: 0
COUGH: 0

## 2022-11-16 NOTE — PROGRESS NOTES
Diamond Melendez (:  1965) is a 65 y.o. female,Established patient, here for evaluation of the following chief complaint(s):  Pruritis (Face for a bout a week )         ASSESSMENT/PLAN:  1. Dermatitis, unspecified    No follow-ups on file. Subjective   SUBJECTIVE/OBJECTIVE:  HPI  Htn- her bp is elevated, she has a lot going on she says. Encouraged to follow up with pcp    Dermatitis- states combo cream worked for her. Last I show was clotrimazole and med dose steroid combo. Encouraged use of lotion without dye or scents on face, has been using marilyn secret and she says it burns. Will give lower potency steroid cream for face, encouraged seldom use. Review of Systems   Constitutional:  Negative for chills and fever. Respiratory:  Negative for cough and shortness of breath. Cardiovascular:  Negative for chest pain, palpitations and leg swelling. Objective   Vitals:    22 0945   BP: (!) 134/90   Pulse:    Temp:    SpO2:        Physical Exam  Constitutional:       Appearance: She is well-developed. HENT:      Right Ear: External ear normal.      Left Ear: External ear normal.      Nose: Nose normal.   Cardiovascular:      Rate and Rhythm: Normal rate and regular rhythm. Heart sounds: Normal heart sounds, S1 normal and S2 normal.   Pulmonary:      Effort: Pulmonary effort is normal. No respiratory distress. Breath sounds: Normal breath sounds. Musculoskeletal:         General: No deformity. Normal range of motion. Cervical back: Full passive range of motion without pain and normal range of motion. Skin:     General: Skin is warm and dry. Comments: Some dryness noted to forehead, pt states her face looks good today without the complaint that she had when she made apt   Neurological:      Mental Status: She is alert and oriented to person, place, and time. An electronic signature was used to authenticate this note.     --Rafat Myrick, APRN - CNP

## 2022-11-16 NOTE — PROGRESS NOTES
Visit Information    Have you changed or started any medications since your last visit including any over-the-counter medicines, vitamins, or herbal medicines? no   Are you having any side effects from any of your medications? -  no  Have you stopped taking any of your medications? Is so, why? -  no    Have you seen any other physician or provider since your last visit? No  Have you had any other diagnostic tests since your last visit? No  Have you been seen in the emergency room and/or had an admission to a hospital since we last saw you? No  Have you had your routine dental cleaning in the past 6 months? no    Have you activated your [employer] account? If not, what are your barriers?  Yes     Patient Care Team:  JESIKA Tam CNP as PCP - General (Family Medicine)  JESIKA Tam CNP as PCP [facility]  Renetta Mane DPM as Physician (Podiatry)  Rico Patterson MD as Consulting Physician (Gastroenterology)    Medical History Review  Past Medical, Family, and Social History reviewed and does not contribute to the patient presenting condition    Health Maintenance   Topic Date Due    COVID-19 Vaccine (1) Never done    Pneumococcal 0-64 years Vaccine (1 - PCV) 07/03/1972    Hepatitis C screen  Never done    Breast cancer screen  Never done    Shingles vaccine (1 of 2) Never done    Cervical cancer screen  07/05/2020    Lipids  10/22/2021    Diabetes screen  07/26/2022    Flu vaccine (1) 08/01/2022    Colorectal Cancer Screen  03/20/2023    DTaP/Tdap/Td vaccine (3 - Td or Tdap) 04/12/2023    Depression Screen  05/20/2023    Hepatitis A vaccine  Aged Out    Hib vaccine  Aged Out    Meningococcal (ACWY) vaccine  Aged Out    HIV screen  Discontinued

## 2022-12-07 DIAGNOSIS — I10 ESSENTIAL HYPERTENSION: ICD-10-CM

## 2022-12-07 DIAGNOSIS — Z76.0 MEDICATION REFILL: ICD-10-CM

## 2022-12-08 NOTE — TELEPHONE ENCOUNTER
Last visit: 11-16-22  Last Med refill: 9-6-22  Does patient have enough medication for 72 hours: Yes    Next Visit Date:  No future appointments.     Health Maintenance   Topic Date Due    COVID-19 Vaccine (1) Never done    Pneumococcal 0-64 years Vaccine (1 - PCV) Never done    Hepatitis C screen  Never done    Breast cancer screen  Never done    Shingles vaccine (1 of 2) Never done    Cervical cancer screen  07/05/2020    Lipids  10/22/2021    Diabetes screen  07/26/2022    Flu vaccine (1) 11/16/2023 (Originally 8/1/2022)    Colorectal Cancer Screen  03/20/2023    DTaP/Tdap/Td vaccine (3 - Td or Tdap) 04/12/2023    Depression Screen  05/20/2023    Hepatitis A vaccine  Aged Out    Hib vaccine  Aged Out    Meningococcal (ACWY) vaccine  Aged Out    HIV screen  Discontinued       Hemoglobin A1C (%)   Date Value   08/22/2018 4.8             ( goal A1C is < 7)   No results found for: LABMICR  LDL Cholesterol (mg/dL)   Date Value   10/22/2020 153 (H)   07/26/2019 155 (H)       (goal LDL is <100)   AST (U/L)   Date Value   11/22/2020 26     ALT (U/L)   Date Value   11/22/2020 23     BUN (mg/dL)   Date Value   11/22/2020 11     BP Readings from Last 3 Encounters:   11/16/22 (!) 134/90   05/20/22 123/87   02/04/22 (!) 139/93          (goal 120/80)    All Future Testing planned in [facility]  Lab Frequency Next Occurrence   H. Pylori Breath Test Once 05/10/2022               Patient Active Problem List:     Calculus (=stone)     Essential hypertension     Asthma     Syncope and collapse     Ischemic stroke (HCC)     Right sided weakness     Meralgia paresthetica of right side     Acute right hemiparesis (HCC)     Paresthesias     Numbness     Hypercholesteremia     Chest pain     Closed fracture of lower end of left radius with routine healing     Establishing care with new doctor, encounter for

## 2022-12-09 RX ORDER — AMLODIPINE BESYLATE 10 MG/1
TABLET ORAL
Qty: 90 TABLET | Refills: 0 | Status: SHIPPED | OUTPATIENT
Start: 2022-12-09 | End: 2023-12-07

## 2022-12-25 ENCOUNTER — HOSPITAL ENCOUNTER (EMERGENCY)
Age: 57
Discharge: HOME | End: 2022-12-25
Attending: EMERGENCY MEDICINE
Payer: COMMERCIAL

## 2022-12-25 VITALS
DIASTOLIC BLOOD PRESSURE: 96 MMHG | HEART RATE: 88 BPM | OXYGEN SATURATION: 100 % | TEMPERATURE: 99 F | BODY MASS INDEX: 29.12 KG/M2 | SYSTOLIC BLOOD PRESSURE: 148 MMHG | WEIGHT: 175 LBS | RESPIRATION RATE: 16 BRPM

## 2022-12-25 DIAGNOSIS — M54.31 SCIATICA OF RIGHT SIDE: ICD-10-CM

## 2022-12-25 DIAGNOSIS — M79.604 RIGHT LEG PAIN: Primary | ICD-10-CM

## 2022-12-25 PROCEDURE — 99283 EMERGENCY DEPT VISIT LOW MDM: CPT

## 2022-12-25 ASSESSMENT — PAIN - FUNCTIONAL ASSESSMENT: PAIN_FUNCTIONAL_ASSESSMENT: 0-10

## 2022-12-25 ASSESSMENT — PAIN SCALES - GENERAL: PAINLEVEL_OUTOF10: 7

## 2022-12-25 ASSESSMENT — PAIN DESCRIPTION - FREQUENCY: FREQUENCY: CONTINUOUS

## 2022-12-25 ASSESSMENT — PAIN DESCRIPTION - DESCRIPTORS: DESCRIPTORS: PRESSURE

## 2022-12-25 ASSESSMENT — PAIN DESCRIPTION - LOCATION: LOCATION: LEG

## 2022-12-25 ASSESSMENT — PAIN DESCRIPTION - ORIENTATION: ORIENTATION: RIGHT

## 2022-12-25 NOTE — DISCHARGE INSTRUCTIONS
Return to the main entrance of the hospital tomorrow morning, December 26, at 7 AM for your Doppler ultrasound of right leg. Follow this medication plan for 3 days to control your pain: (warning - be mindful of over-the-counter medications that contain Tylenol. 9am Tylenol 1000 mg  12noon Ibuprofen 800 mg  3pm Tylenol 1000 mg  6pm Ibuprofen 800 mg    Return to this emergency room immediately if your symptoms persist, worsen or if new ones form. Make sure you follow-up with your primary care doctor within the next 1-2 business days.

## 2022-12-25 NOTE — ED PROVIDER NOTES
EMERGENCY DEPARTMENT ENCOUNTER    Pt Name: Cristian Osorio  MRN: [id number]  [place] 1965  Date of evaluation: 12/25/22  CHIEF COMPLAINT       Chief Complaint   Patient presents with    Leg Pain     Right no injury     HISTORY OF PRESENT ILLNESS   Patient has pain down right leg. Present 1 month ago however had surgery on left shoulder and was put on pain medications and symptoms resolved however returned after she stopped taking pain medications. Pain describes rating down leg worse with walking. Went for ultrasound tomorrow morning. Likely sciatica. REVIEW OF SYSTEMS     Review of Systems   All other systems reviewed and are negative. PASTMEDICAL HISTORY     Past Medical History:   Diagnosis Date    Asthma     CVA (cerebral vascular accident) ([facility] [address]     Hypertension     Kidney stone      SURGICAL HISTORY       Past Surgical History:   Procedure Laterality Date    CHOLECYSTECTOMY      LITHOTRIPSY      ROTATOR CUFF REPAIR Left 12/2022     CURRENT MEDICATIONS       Discharge Medication List as of 12/25/2022  5:49 PM        CONTINUE these medications which have NOT CHANGED    Details   amLODIPine (NORVASC) 10 MG tablet take 1 tablet by mouth once daily, Disp-90 tablet, R-0Normal      betamethasone valerate (VALISONE) 0.1 % lotion Apply topically 2 times daily. , Disp-1 each, R-0, Normal      Cholecalciferol (RA VITAMIN D-3) 50 MCG (2000 UT) CAPS take 1 capsule by mouth once daily, Disp-30 capsule, R-2Normal      cetirizine (ZYRTEC) 10 MG tablet Take 1 tablet by mouth daily, Disp-30 tablet, R-0Normal      hydrocortisone 2.5 % cream Apply topically 2 times daily. , Disp-20 g, R-0, Normal      atorvastatin (LIPITOR) 40 MG tablet Take 1 tablet by mouth nightly, Disp-390 tablet, R-0Normal      promethazine (PHENERGAN) 12.5 MG tablet Take 1 tablet by mouth every 6 hours as needed for Nausea, Disp-15 tablet, R-0Normal      triamcinolone (KENALOG) 0.025 % ointment apply to affected area twice a day TO RASH FOR UP TO 2 WEEKS., Disp-15 g, R-0, Normal           ALLERGIES     has No Known Allergies. FAMILY HISTORY     has no family status information on file. SOCIAL HISTORY       Social History     Tobacco Use    Smoking status: Never    Smokeless tobacco: Never   Substance Use Topics    Alcohol use: No    Drug use: No     PHYSICAL EXAM     INITIAL VITALS: BP (!) 148/96   Pulse 88   Temp 99 °F (37.2 °C) (Oral)   Resp 16   Wt 175 lb (79.4 kg)   SpO2 100%   BMI 29.12 kg/m²    Physical Exam  Constitutional:       Appearance: Normal appearance. HENT:      Head: Normocephalic. Right Ear: External ear normal.      Left Ear: External ear normal.      Nose: Nose normal.   Eyes:      Conjunctiva/sclera: Conjunctivae normal.   Cardiovascular:      Rate and Rhythm: Regular rhythm. Abdominal:      General: There is no distension. Musculoskeletal:         General: Tenderness (Tenderness paraspinal lumbar muscles, lateral right upper leg. No swelling, erythema, no signs of infection) present. Skin:     General: Skin is dry. Neurological:      Mental Status: She is alert. Mental status is at baseline. MEDICAL DECISION MAKING:   Temperature was 99, pulse was 88, and blood pressure was 148/96. Pulse oximetry on room air was 100%. Exam notable for right paraspinal lumbar tenderness as well as tenderness to right lateral upper leg. Low suspicion for acute osseous injury. Concerning for DVT however hospital is not [facility] vascular ultrasound coverage at this time of the evening. Will ask patient to return to morning at 7 AM for vascular upper study of right leg to rule out DVT. CRITICAL CARE:     The 30 ml/kg fluid bolus is not ordered due to concern for fluid overload and/or heart failure.     PROCEDURES:    Procedures    DIAGNOSTIC RESULTS   EKG:All EKG's are interpreted by the Emergency Department Physician who either signs or Co-signs this chart in the absence of a cardiologist.        RADIOLOGY:All plain film, CT, MRI, and formal ultrasound images (except ED bedside ultrasound) are read by the radiologist, see reports below, unless otherwisenoted in MDM or here. VL DUP LOWER EXTREMITY VENOUS RIGHT    (Results Pending)     LABS: All lab results were reviewed by myself, and all abnormals are listed below. Labs Reviewed - No data to display    EMERGENCY DEPARTMENTCOURSE:         Vitals:    Vitals:    12/25/22 1557   BP: (!) 148/96   Pulse: 88   Resp: 16   Temp: 99 °F (37.2 °C)   TempSrc: Oral   SpO2: 100%   Weight: 175 lb (79.4 kg)       The patient was given the following medications while in the emergency department:  No orders of the defined types were placed in this encounter. CONSULTS:  None    FINAL IMPRESSION      1. Right leg pain    2.  Sciatica of right side          DISPOSITION/PLAN   DISPOSITION Decision To Discharge 12/25/2022 06:00:02 PM      PATIENT REFERRED TO:  JESIKA Godinez - [facility]  [address]  [phone]    In 2 days    DISCHARGE MEDICATIONS:  Discharge Medication List as of 12/25/2022  5:49 PM        Melissa Galindo MD  Attending Emergency Physician                    Mary Sood MD  12/27/22 1140

## 2022-12-25 NOTE — ED NOTES
Pt arrives to the ED for c/o right hip and leg pain  No injury or trauma  Gait is stable and even      Ezekiel Gan RN  12/25/22 6682

## 2022-12-28 ENCOUNTER — HOSPITAL ENCOUNTER (OUTPATIENT)
Dept: VASCULAR LAB | Age: 57
Discharge: HOME | End: 2022-12-28
Payer: MEDICAID

## 2022-12-28 DIAGNOSIS — M79.604 RIGHT LEG PAIN: ICD-10-CM

## 2022-12-28 DIAGNOSIS — M54.31 SCIATICA OF RIGHT SIDE: ICD-10-CM

## 2022-12-28 PROCEDURE — 93971 EXTREMITY STUDY: CPT

## 2023-01-20 ENCOUNTER — OFFICE VISIT (OUTPATIENT)
Dept: PRIMARY CARE CLINIC | Age: 58
End: 2023-01-20

## 2023-01-20 VITALS
BODY MASS INDEX: 29.82 KG/M2 | SYSTOLIC BLOOD PRESSURE: 135 MMHG | HEIGHT: 65 IN | HEART RATE: 91 BPM | WEIGHT: 179 LBS | OXYGEN SATURATION: 100 % | DIASTOLIC BLOOD PRESSURE: 91 MMHG

## 2023-01-20 DIAGNOSIS — M25.462 SWELLING OF JOINT, KNEE, LEFT: Primary | ICD-10-CM

## 2023-01-20 RX ORDER — IBUPROFEN 800 MG/1
400 TABLET ORAL EVERY 6 HOURS PRN
Qty: 120 TABLET | Refills: 3 | Status: SHIPPED | OUTPATIENT
Start: 2023-01-20

## 2023-01-20 RX ORDER — KETOROLAC TROMETHAMINE 10 MG/1
TABLET, FILM COATED ORAL
COMMUNITY
Start: 2022-12-09

## 2023-01-20 RX ORDER — HYDROCODONE BITARTRATE AND ACETAMINOPHEN 5; 325 MG/1; MG/1
TABLET ORAL
COMMUNITY
Start: 2022-12-22

## 2023-01-20 RX ORDER — TRAMADOL HYDROCHLORIDE 50 MG/1
50 TABLET ORAL EVERY 6 HOURS PRN
COMMUNITY
Start: 2023-01-11

## 2023-01-20 RX ORDER — ACETAMINOPHEN 500 MG
500 TABLET ORAL 4 TIMES DAILY PRN
Qty: 120 TABLET | Refills: 0 | Status: SHIPPED | OUTPATIENT
Start: 2023-01-20

## 2023-01-20 ASSESSMENT — ENCOUNTER SYMPTOMS
SINUS PAIN: 0
COLOR CHANGE: 0
BACK PAIN: 0
CHEST TIGHTNESS: 0
SHORTNESS OF BREATH: 0
VOMITING: 0
NAUSEA: 0
ABDOMINAL PAIN: 0
SORE THROAT: 0
PHOTOPHOBIA: 0
COUGH: 0
DIARRHEA: 0
SINUS PRESSURE: 0

## 2023-01-20 ASSESSMENT — PATIENT HEALTH QUESTIONNAIRE - PHQ9
1. LITTLE INTEREST OR PLEASURE IN DOING THINGS: 0
SUM OF ALL RESPONSES TO PHQ QUESTIONS 1-9: 0
2. FEELING DOWN, DEPRESSED OR HOPELESS: 0
SUM OF ALL RESPONSES TO PHQ QUESTIONS 1-9: 0
SUM OF ALL RESPONSES TO PHQ9 QUESTIONS 1 & 2: 0

## 2023-01-20 NOTE — PROGRESS NOTES
[facility]  [facility] PRIMARY CARE  [address] [facility] MAIN FLOOR  [place] [id number]  Dept: [phone]       Bushra Jarrell is a 57 y.o. female who presents today for her  medical conditions/complaintsas noted below.  Bushra Jarrell is c/o of Joint Swelling (Pt has been having swelling and pain in her left knee X 3 days)    HPI:     HPI    Patient presents with three days of left knee swelling.  Patient denies any known injury or trauma but states she awakened three days ago with swelling and pain on bending.  She denies any difficulty ambulating, there is no joint pain or laxity on physical exam.  Mild amount of crepitus noted on flexion and extension with small effusion to the lateral knee but otherwise exam is intact.  She denies any fevers, erythematous joints or numbness/tingling. She denies the use of any OTC remedies to this point.       Past Medical History:   Diagnosis Date    Asthma     CVA (cerebral vascular accident) (HCC)     Hypertension     Kidney stone       Past Surgical History:   Procedure Laterality Date    CHOLECYSTECTOMY      LITHOTRIPSY      ROTATOR CUFF REPAIR Left 12/2022     No family history on file.  Social History     Tobacco Use    Smoking status: Never    Smokeless tobacco: Never   Substance Use Topics    Alcohol use: No      Current Outpatient Medications   Medication Sig Dispense Refill    HYDROcodone-acetaminophen (NORCO) 5-325 MG per tablet take 1 tablet by mouth every 8 hours AS NEEDED FOR PAIN maximum daily dose of 3      ketorolac (TORADOL) 10 MG tablet take 1 tablet by mouth every 6 hours AS NEEDED FOR PAIN      traMADol (ULTRAM) 50 MG tablet Take 50 mg by mouth every 6 hours as needed.      ibuprofen (ADVIL;MOTRIN) 800 MG tablet Take 0.5 tablets by mouth every 6 hours as needed for Pain 120 tablet 3    acetaminophen (TYLENOL) 500 MG tablet Take 1 tablet by mouth 4 times daily as needed for Pain 120 tablet 0    amLODIPine  (NORVASC) 10 MG tablet take 1 tablet by mouth once daily 90 tablet 0    betamethasone valerate (VALISONE) 0.1 % lotion Apply topically 2 times daily. 1 each 0    Cholecalciferol (RA VITAMIN D-3) 50 MCG (2000 UT) CAPS take 1 capsule by mouth once daily 30 capsule 2    cetirizine (ZYRTEC) 10 MG tablet Take 1 tablet by mouth daily 30 tablet 0    hydrocortisone 2.5 % cream Apply topically 2 times daily. 20 g 0    atorvastatin (LIPITOR) 40 MG tablet Take 1 tablet by mouth nightly 390 tablet 0    promethazine (PHENERGAN) 12.5 MG tablet Take 1 tablet by mouth every 6 hours as needed for Nausea 15 tablet 0    triamcinolone (KENALOG) 0.025 % ointment apply to affected area twice a day TO RASH FOR UP TO 2 WEEKS. 15 g 0     No current facility-administered medications for this visit. No Known Allergies    Health Maintenance   Topic Date Due    COVID-19 Vaccine (1) Never done    Pneumococcal 0-64 years Vaccine (1 - PCV) Never done    Hepatitis C screen  Never done    Breast cancer screen  Never done    Shingles vaccine (1 of 2) Never done    Cervical cancer screen  07/05/2020    Lipids  10/22/2021    Diabetes screen  07/26/2022    Flu vaccine (1) 11/16/2023 (Originally 8/1/2022)    Colorectal Cancer Screen  03/20/2023    DTaP/Tdap/Td vaccine (3 - Td or Tdap) 04/12/2023    Depression Screen  05/20/2023    Hepatitis A vaccine  Aged Out    Hib vaccine  Aged Out    Meningococcal (ACWY) vaccine  Aged Out    HIV screen  Discontinued       Review of Systems   Constitutional:  Negative for activity change, appetite change and fever. HENT:  Negative for sinus pressure, sinus pain and sore throat. Eyes:  Negative for photophobia and visual disturbance. Respiratory:  Negative for cough, chest tightness and shortness of breath. Cardiovascular:  Negative for chest pain. Gastrointestinal:  Negative for abdominal pain, diarrhea, nausea and vomiting. Endocrine: Negative for polydipsia and polyuria.    Genitourinary:  Negative for difficulty urinating. Musculoskeletal:  Negative for back pain and neck pain. Skin:  Negative for color change. Neurological:  Negative for dizziness, light-headedness and headaches. Psychiatric/Behavioral:  Negative for behavioral problems. Objective:     Physical Exam  Vitals and nursing note reviewed. Constitutional:       General: She is not in acute distress. Appearance: Normal appearance. HENT:      Head: Normocephalic. Right Ear: External ear normal.      Left Ear: External ear normal.      Nose: Nose normal. No congestion or rhinorrhea. Mouth/Throat:      Mouth: Mucous membranes are moist.      Pharynx: Oropharynx is clear. Eyes:      Conjunctiva/sclera: Conjunctivae normal.      Pupils: Pupils are equal, round, and reactive to light. Cardiovascular:      Rate and Rhythm: Normal rate and regular rhythm. Pulses: Normal pulses. Heart sounds: Normal heart sounds. No murmur heard. Pulmonary:      Effort: Pulmonary effort is normal. No respiratory distress. Breath sounds: Normal breath sounds. No wheezing. Abdominal:      Palpations: Abdomen is soft. Tenderness: There is no abdominal tenderness. Musculoskeletal:         General: Swelling present. No tenderness or signs of injury. Normal range of motion. Cervical back: Normal range of motion. Right knee: Normal.      Left knee: Swelling, effusion and crepitus present. No erythema or bony tenderness. Normal range of motion. No tenderness. No LCL laxity or MCL laxity. Normal alignment and normal patellar mobility. Skin:     General: Skin is warm and dry. Capillary Refill: Capillary refill takes less than 2 seconds. Neurological:      General: No focal deficit present. Mental Status: She is alert and oriented to person, place, and time. Mental status is at baseline. Motor: No weakness.       Gait: Gait normal.   Psychiatric:         Behavior: Behavior normal. Assessment:      No results found for this visit on 01/20/23. Cortez Brown was seen today for joint swelling. Diagnoses and all orders for this visit:    Swelling of joint, knee, left  -     ibuprofen (ADVIL;MOTRIN) 800 MG tablet; Take 0.5 tablets by mouth every 6 hours as needed for Pain  -     acetaminophen (TYLENOL) 500 MG tablet; Take 1 tablet by mouth 4 times daily as needed for Pain     Encouraged the use of heat/ice and elevation accordingly along with prn otc analgesics. Cautioned on use of frequent ibuprofen due to underlying hypertension     No obvious trauma or injury documented, thus will defer imaging at this time as gait/ROM are stable    No concern for gout flare     No follow-ups on file. Plan of care reviewed with patient. Questions and concerns addressed to patient satisfaction. Follow up as directed.      Electronically signed by JESIKA Castillo CNP, PACon 1/20/2023 at 2:34 PM

## 2023-02-03 ENCOUNTER — OFFICE VISIT (OUTPATIENT)
Dept: PRIMARY CARE CLINIC | Age: 58
End: 2023-02-03

## 2023-02-03 VITALS
DIASTOLIC BLOOD PRESSURE: 89 MMHG | HEART RATE: 84 BPM | BODY MASS INDEX: 30.22 KG/M2 | WEIGHT: 181.6 LBS | OXYGEN SATURATION: 100 % | SYSTOLIC BLOOD PRESSURE: 141 MMHG

## 2023-02-03 DIAGNOSIS — M25.562 LEFT LATERAL KNEE PAIN: Primary | ICD-10-CM

## 2023-02-03 RX ORDER — METHYLPREDNISOLONE 4 MG/1
TABLET ORAL
Qty: 21 KIT | Refills: 0 | Status: SHIPPED | OUTPATIENT
Start: 2023-02-03

## 2023-02-03 SDOH — ECONOMIC STABILITY: HOUSING INSECURITY
IN THE LAST 12 MONTHS, WAS THERE A TIME WHEN YOU DID NOT HAVE A STEADY PLACE TO SLEEP OR SLEPT IN A SHELTER (INCLUDING NOW)?: NO

## 2023-02-03 SDOH — ECONOMIC STABILITY: FOOD INSECURITY: WITHIN THE PAST 12 MONTHS, THE FOOD YOU BOUGHT JUST DIDN'T LAST AND YOU DIDN'T HAVE MONEY TO GET MORE.: NEVER TRUE

## 2023-02-03 SDOH — ECONOMIC STABILITY: FOOD INSECURITY: WITHIN THE PAST 12 MONTHS, YOU WORRIED THAT YOUR FOOD WOULD RUN OUT BEFORE YOU GOT MONEY TO BUY MORE.: NEVER TRUE

## 2023-02-03 SDOH — ECONOMIC STABILITY: INCOME INSECURITY: HOW HARD IS IT FOR YOU TO PAY FOR THE VERY BASICS LIKE FOOD, HOUSING, MEDICAL CARE, AND HEATING?: NOT HARD AT ALL

## 2023-02-03 ASSESSMENT — ENCOUNTER SYMPTOMS
SINUS PAIN: 0
BACK PAIN: 0
PHOTOPHOBIA: 0
NAUSEA: 0
CHEST TIGHTNESS: 0
COUGH: 0
SORE THROAT: 0
DIARRHEA: 0
VOMITING: 0
SINUS PRESSURE: 0
COLOR CHANGE: 0
ABDOMINAL PAIN: 0
SHORTNESS OF BREATH: 0

## 2023-02-03 NOTE — PROGRESS NOTES
[address] PRIMARY CARE  [address] [place] [id number]  Dept: [phone]       Rafa Contreras is a 65 y.o. female who presents today for her  medical conditions/complaintsas noted below. Rafa Contreras is c/o of Knee Pain (Patient states for the past 2 weeks she has had left knee pain.)      HPI:     HPI    Patient returns today with persistent left-sided knee pain and swelling. States she has been trialing over-the-counter acetaminophen and ibuprofen and ice and elevation without relief. Patient believes as though swelling has worsened. Pain is present with any prolonged standing or walking. There is no significant pain on palpation. Range of motion and strength are intact, however, there is a mild limp with walking due to the pain/discomfort. She denies any fevers, body aches or myalgias. There is no erythema or warmth to the joint and no tendon laxity. Past Medical History:   Diagnosis Date    Asthma     CVA (cerebral vascular accident) ([address]     Hypertension     Kidney stone       Past Surgical History:   Procedure Laterality Date    CHOLECYSTECTOMY      LITHOTRIPSY      ROTATOR CUFF REPAIR Left 12/2022     No family history on file. Social History     Tobacco Use    Smoking status: Never    Smokeless tobacco: Never   Substance Use Topics    Alcohol use: No      Current Outpatient Medications   Medication Sig Dispense Refill    methylPREDNISolone (MEDROL, AISHA,) 4 MG tablet Take by mouth. 21 kit 0    HYDROcodone-acetaminophen (NORCO) 5-325 MG per tablet take 1 tablet by mouth every 8 hours AS NEEDED FOR PAIN maximum daily dose of 3      ketorolac (TORADOL) 10 MG tablet take 1 tablet by mouth every 6 hours AS NEEDED FOR PAIN      traMADol (ULTRAM) 50 MG tablet Take 50 mg by mouth every 6 hours as needed.       ibuprofen (ADVIL;MOTRIN) 800 MG tablet Take 0.5 tablets by mouth every 6 hours as needed for Pain 120 tablet 3 acetaminophen (TYLENOL) 500 MG tablet Take 1 tablet by mouth 4 times daily as needed for Pain 120 tablet 0    amLODIPine (NORVASC) 10 MG tablet take 1 tablet by mouth once daily 90 tablet 0    betamethasone valerate (VALISONE) 0.1 % lotion Apply topically 2 times daily. 1 each 0    Cholecalciferol (RA VITAMIN D-3) 50 MCG (2000 UT) CAPS take 1 capsule by mouth once daily 30 capsule 2    cetirizine (ZYRTEC) 10 MG tablet Take 1 tablet by mouth daily 30 tablet 0    hydrocortisone 2.5 % cream Apply topically 2 times daily. 20 g 0    atorvastatin (LIPITOR) 40 MG tablet Take 1 tablet by mouth nightly 390 tablet 0    promethazine (PHENERGAN) 12.5 MG tablet Take 1 tablet by mouth every 6 hours as needed for Nausea 15 tablet 0    triamcinolone (KENALOG) 0.025 % ointment apply to affected area twice a day TO RASH FOR UP TO 2 WEEKS. 15 g 0     No current facility-administered medications for this visit. No Known Allergies    Health Maintenance   Topic Date Due    COVID-19 Vaccine (1) Never done    Pneumococcal 0-64 years Vaccine (1 - PCV) Never done    Hepatitis C screen  Never done    Breast cancer screen  Never done    Shingles vaccine (1 of 2) Never done    Cervical cancer screen  07/05/2020    Lipids  10/22/2021    Diabetes screen  07/26/2022    Flu vaccine (1) 11/16/2023 (Originally 8/1/2022)    Colorectal Cancer Screen  03/20/2023    DTaP/Tdap/Td vaccine (3 - Td or Tdap) 04/12/2023    Depression Screen  01/20/2024    Hepatitis A vaccine  Aged Out    Hib vaccine  Aged Out    Meningococcal (ACWY) vaccine  Aged Out    HIV screen  Discontinued       Review of Systems   Constitutional:  Negative for activity change, appetite change and fever. HENT:  Negative for sinus pressure, sinus pain and sore throat. Eyes:  Negative for photophobia and visual disturbance. Respiratory:  Negative for cough, chest tightness and shortness of breath. Cardiovascular:  Negative for chest pain.    Gastrointestinal:  Negative for abdominal pain, diarrhea, nausea and vomiting. Endocrine: Negative for polydipsia and polyuria. Genitourinary:  Negative for difficulty urinating. Musculoskeletal:  Positive for arthralgias, gait problem and joint swelling. Negative for back pain and neck pain. Skin:  Negative for color change. Neurological:  Negative for dizziness, light-headedness and headaches. Psychiatric/Behavioral:  Negative for behavioral problems. Objective:     Physical Exam  Vitals and nursing note reviewed. Constitutional:       General: She is not in acute distress. Appearance: Normal appearance. HENT:      Head: Normocephalic. Right Ear: External ear normal.      Left Ear: External ear normal.      Nose: Nose normal. No congestion or rhinorrhea. Mouth/Throat:      Mouth: Mucous membranes are moist.   Eyes:      Conjunctiva/sclera: Conjunctivae normal.      Pupils: Pupils are equal, round, and reactive to light. Cardiovascular:      Rate and Rhythm: Normal rate and regular rhythm. Pulses: Normal pulses. Heart sounds: Normal heart sounds. No murmur heard. Pulmonary:      Effort: Pulmonary effort is normal. No respiratory distress. Breath sounds: Normal breath sounds. No wheezing. Abdominal:      Palpations: Abdomen is soft. Tenderness: There is no abdominal tenderness. Musculoskeletal:         General: Swelling and signs of injury present. Normal range of motion. Cervical back: Normal range of motion. Right knee: Normal.      Left knee: Swelling and effusion present. No erythema or bony tenderness. Normal range of motion. Normal alignment. Skin:     General: Skin is warm and dry. Capillary Refill: Capillary refill takes less than 2 seconds. Neurological:      General: No focal deficit present. Mental Status: She is alert and oriented to person, place, and time. Mental status is at baseline. Motor: No weakness.       Gait: Gait normal.   Psychiatric:         Behavior: Behavior normal.       Assessment:      No results found for this visit on 02/03/23.    Bushra was seen today for knee pain.    Diagnoses and all orders for this visit:    Left lateral knee pain  -     XR KNEE LEFT (MIN 4 VIEWS); Future  -     Ambulatory referral to Orthopedic Surgery  -     methylPREDNISolone (MEDROL, AISHA,) 4 MG tablet; Take by mouth.     Continue over-the-counter analgesics, ice and elevation.    Will refer to orthopedics as there is now an effusion that would benefit from ultrasound and further evaluation.    Return if symptoms worsen or fail to improve.      Plan of care reviewed with patient.  Questions and concerns addressed to patient satisfaction. Follow up as directed.     Electronically signed by JESIKA Galindo CNP, PACon 2/3/2023 at 11:53 AM

## 2023-02-15 ENCOUNTER — OFFICE VISIT (OUTPATIENT)
Dept: ORTHOPEDIC SURGERY | Age: 58
End: 2023-02-15

## 2023-02-15 VITALS — BODY MASS INDEX: 30.42 KG/M2 | WEIGHT: 182.8 LBS

## 2023-02-15 DIAGNOSIS — M22.2X2 PATELLOFEMORAL SYNDROME OF LEFT KNEE: Primary | ICD-10-CM

## 2023-02-15 DIAGNOSIS — M25.562 LEFT KNEE PAIN, UNSPECIFIED CHRONICITY: ICD-10-CM

## 2023-02-15 NOTE — PROGRESS NOTES
[address] [facility]  [address] [facility] [id number]  Dept: [phone] New Patient Visit      Subjective:   CHIEF COMPLAINT:    Chief Complaint   Patient presents with    New Patient     Left lateral knee pain       HISTORY OF PRESENT ILLNESS:    The patient is a 65 y.o. female who is an established orthopedic patient being seen for a new complaint of left knee swelling. Patient states she has had one month of left knee swelling which has has been ongoing. She states that there was no history of falls or injury to the knee. States that it is really not painful but rather, is swollen and causes a tightness when she tries to bend the knee. She states that she notices discomfort with squatting or walking up stairs but has not had any activities limited by her swelling. Has tried motrin, tylenol which has not provided any relief of swelling. Has tried elevation and heat. States that she has actually been off of her feet more than normal over the past month and still has the swelling in the knee. Denies any popping, clicking, or catching in the knee. No feelings of instability. Has also been provided with a course of oral steroids which has not provided relief. Has not yet tried physical therapy or compressive wraps. Denies numbness/tingling in extremity. No other complaints. Past Medical History:    Past Medical History:   Diagnosis Date    Asthma     CVA (cerebral vascular accident) ([facility] [address]     Hypertension     Kidney stone        Past Surgical History:    Past Surgical History:   Procedure Laterality Date    CHOLECYSTECTOMY      LITHOTRIPSY      ROTATOR CUFF REPAIR Left 12/2022       Current Medications:   Current Outpatient Medications   Medication Sig Dispense Refill    methylPREDNISolone (MEDROL, AISHA,) 4 MG tablet Take by mouth.  21 kit 0    HYDROcodone-acetaminophen (NORCO) 5-325 MG per tablet take 1 tablet by mouth every 8 hours AS NEEDED FOR PAIN maximum daily dose of 3      ketorolac (TORADOL) 10 MG tablet take 1 tablet by mouth every 6 hours AS NEEDED FOR PAIN      traMADol (ULTRAM) 50 MG tablet Take 50 mg by mouth every 6 hours as needed. ibuprofen (ADVIL;MOTRIN) 800 MG tablet Take 0.5 tablets by mouth every 6 hours as needed for Pain 120 tablet 3    acetaminophen (TYLENOL) 500 MG tablet Take 1 tablet by mouth 4 times daily as needed for Pain 120 tablet 0    amLODIPine (NORVASC) 10 MG tablet take 1 tablet by mouth once daily 90 tablet 0    betamethasone valerate (VALISONE) 0.1 % lotion Apply topically 2 times daily. 1 each 0    Cholecalciferol (RA VITAMIN D-3) 50 MCG (2000 UT) CAPS take 1 capsule by mouth once daily 30 capsule 2    cetirizine (ZYRTEC) 10 MG tablet Take 1 tablet by mouth daily 30 tablet 0    hydrocortisone 2.5 % cream Apply topically 2 times daily. 20 g 0    atorvastatin (LIPITOR) 40 MG tablet Take 1 tablet by mouth nightly 390 tablet 0    promethazine (PHENERGAN) 12.5 MG tablet Take 1 tablet by mouth every 6 hours as needed for Nausea 15 tablet 0    triamcinolone (KENALOG) 0.025 % ointment apply to affected area twice a day TO RASH FOR UP TO 2 WEEKS. 15 g 0     No current facility-administered medications for this visit. Allergies:    Patient has no known allergies.     Social History:   Social History     Socioeconomic History    Marital status: Single     Spouse name: Not on file    Number of children: Not on file    Years of education: Not on file    Highest education level: Not on file   Occupational History    Not on file   Tobacco Use    Smoking status: Never    Smokeless tobacco: Never   Substance and Sexual Activity    Alcohol use: No    Drug use: No    Sexual activity: Not on file   Other Topics Concern    Not on file   Social History Narrative    Not on file     Social Determinants of Health     Financial Resource Strain: Low Risk     Difficulty of Paying Living Expenses: Not hard at all   Food Insecurity: No Food Insecurity    Worried About Running Out of Food in the Last Year: Never true    Ran Out of Food in the Last Year: Never true   Transportation Needs: Not on file   Physical Activity: Not on file   Stress: Not on file   Social Connections: Not on file   Intimate Partner Violence: Not on file   Housing Stability: Unknown    Unable to Pay for Housing in the Last Year: Not on file    Number of [place] in the Last Year: Not on file    Unstable Housing in the Last Year: No       Family History:  History reviewed. No pertinent family history. REVIEW OF SYSTEMS:  Gen: no fevers/chills  Cardio: no chest pain  Lungs: no shortness of breath   MSK: swelling in left knee   Neuro: no numbness, tingling, weakness    Objective :   PHYSICAL EXAM:  Gen: AAOx3, no acute distress  Cardio: regular rate   Lungs: symmetrical chest expansion   MSK: LLE: Moderate effusion present along knee. Very minimal tenderness to palpation along superolateral aspect of the knee. No instability with patellar motion. No abrasions, lacerations, or gross deformity. ROM from 0-120 degrees without restriction. Negative anterior/posterior drawer. Negative Joaquín evaluation. No instability with varus/valgus stress testing. Sensation intact throughout extremity. Extremity warm and well perfused. Radiology:   History: Left knee swelling    Comparison: None    Findings: AP, lateral, notch, and sunrise views of the left knee showing no clear evidence of acute osseous abnormalities. Overall joint space is well maintained. There is very mild subchondral sclerosis present along medial joint line. Moderate effusion appreciated on lateral view. No fractures, subluxations, dislocations. Impression: No acute abnormalities to left knee    Assessment:   65y.o. year old female with the following:      Diagnosis Orders   1. Patellofemoral syndrome of left knee  [facility] Physical [facility]      2.  Left knee pain, unspecified chronicity  XR KNEE LEFT (MIN 4 VIEWS)    [facility]           Plan:      Patient seen and examined today. Discussed with patient that her x-rays overall show minimal at best osteoarthritic changes to the left knee. Discussed likely etiology of patient left knee swelling. Given presentation, likely swelling secondary to patellofemoral syndrome. Recommended strengthening with physical therapy as well as NSAIDs. Will have patient return to clinic in 2-3 months for repeat assessment. Patient can call sooner with any questions or concerns. Return for Repeat assessment left knee swelling . No orders of the defined types were placed in this encounter.       Orders Placed This Encounter   Procedures    XR KNEE LEFT (MIN 4 VIEWS)     Standing Status:   Future     Number of Occurrences:   1     Standing Expiration Date:   2/14/2024    [facility] Physical [facility]     Referral Priority:   Routine     Referral Type:   Eval and Treat     Referral Reason:   Specialty Services Required     Requested Specialty:   Physical Therapist     Number of Visits Requested:   1          Electronically signed by Chelsea Meyer DO on2/15/2023 at 9:31 AM

## 2023-03-01 DIAGNOSIS — Z76.0 MEDICATION REFILL: ICD-10-CM

## 2023-03-01 DIAGNOSIS — I10 ESSENTIAL HYPERTENSION: ICD-10-CM

## 2023-03-01 RX ORDER — AMLODIPINE BESYLATE 10 MG/1
TABLET ORAL
Qty: 90 TABLET | Refills: 0 | Status: SHIPPED | OUTPATIENT
Start: 2023-03-01

## 2023-03-17 DIAGNOSIS — E78.5 DYSLIPIDEMIA: ICD-10-CM

## 2023-03-17 NOTE — TELEPHONE ENCOUNTER
Last visit: 2/3/23  Last Med refill: 2/2/223  Does patient have enough medication for 72 hours: NO  Next Visit Date:  Future Appointments   Date Time Provider Jose Rodriguez   4/12/2023  9:00 AM SCHEDULE, [facility] [address] Maintenance   Topic Date Due    COVID-19 Vaccine (1) Never done    Pneumococcal 0-64 years Vaccine (1 - PCV) Never done    Hepatitis C screen  Never done    Breast cancer screen  Never done    Shingles vaccine (1 of 2) Never done    Cervical cancer screen  07/05/2020    Lipids  10/22/2021    Diabetes screen  07/26/2022    Colorectal Cancer Screen  03/20/2023    Flu vaccine (1) 11/16/2023 (Originally 8/1/2022)    DTaP/Tdap/Td vaccine (3 - Td or Tdap) 04/12/2023    Depression Screen  01/20/2024    Hepatitis A vaccine  Aged Out    Hib vaccine  Aged Out    Meningococcal (ACWY) vaccine  Aged Out    HIV screen  Discontinued       Hemoglobin A1C (%)   Date Value   08/22/2018 4.8             ( goal A1C is < 7)   No results found for: LABMICR  LDL Cholesterol (mg/dL)   Date Value   10/22/2020 153 (H)   07/26/2019 155 (H)       (goal LDL is <100)   AST (U/L)   Date Value   11/22/2020 26     ALT (U/L)   Date Value   11/22/2020 23     BUN (mg/dL)   Date Value   11/22/2020 11     BP Readings from Last 3 Encounters:   02/03/23 (!) 141/89   01/20/23 (!) 135/91   12/25/22 (!) 148/96          (goal 120/80)    All Future Testing planned in [facility]  Lab Frequency Next Occurrence               Patient Active Problem List:     Calculus (=stone)     Essential hypertension     Asthma     Syncope and collapse     Ischemic stroke (HCC)     Right sided weakness     Meralgia paresthetica of right side     Acute right hemiparesis (HCC)     Paresthesias     Numbness     Hypercholesteremia     Chest pain     Closed fracture of lower end of left radius with routine healing     Establishing care with new doctor, encounter for

## 2023-03-18 ENCOUNTER — HOSPITAL ENCOUNTER (EMERGENCY)
Age: 58
Discharge: HOME | End: 2023-03-18
Attending: EMERGENCY MEDICINE
Payer: MEDICAID

## 2023-03-18 ENCOUNTER — APPOINTMENT (OUTPATIENT)
Dept: GENERAL RADIOLOGY | Age: 58
End: 2023-03-18
Payer: MEDICAID

## 2023-03-18 VITALS
DIASTOLIC BLOOD PRESSURE: 91 MMHG | OXYGEN SATURATION: 100 % | TEMPERATURE: 97.3 F | RESPIRATION RATE: 12 BRPM | SYSTOLIC BLOOD PRESSURE: 129 MMHG | HEART RATE: 86 BPM

## 2023-03-18 DIAGNOSIS — M25.561 ACUTE PAIN OF RIGHT KNEE: Primary | ICD-10-CM

## 2023-03-18 PROCEDURE — 73562 X-RAY EXAM OF KNEE 3: CPT

## 2023-03-18 PROCEDURE — 6370000000 HC RX 637 (ALT 250 FOR IP): Performed by: STUDENT IN AN ORGANIZED HEALTH CARE EDUCATION/TRAINING PROGRAM

## 2023-03-18 PROCEDURE — 99283 EMERGENCY DEPT VISIT LOW MDM: CPT

## 2023-03-18 RX ORDER — ACETAMINOPHEN 500 MG
500 TABLET ORAL 4 TIMES DAILY PRN
Qty: 30 TABLET | Refills: 0 | Status: SHIPPED | OUTPATIENT
Start: 2023-03-18

## 2023-03-18 RX ORDER — ACETAMINOPHEN 325 MG/1
650 TABLET ORAL ONCE
Status: COMPLETED | OUTPATIENT
Start: 2023-03-18 | End: 2023-03-18

## 2023-03-18 RX ADMIN — ACETAMINOPHEN 650 MG: 325 TABLET ORAL at 13:22

## 2023-03-18 ASSESSMENT — PAIN SCALES - GENERAL: PAINLEVEL_OUTOF10: 7

## 2023-03-18 ASSESSMENT — PAIN - FUNCTIONAL ASSESSMENT: PAIN_FUNCTIONAL_ASSESSMENT: 0-10

## 2023-03-18 NOTE — ED NOTES
Patient presents to ED for Left knee pain that has been present since Thursday. Patient reports that she \"twisted it some type of way\" and reports the pain has been getting worse. Current pain 7/10. Patient a/o x 4 with normal non labore respirations. Patient changed into gown, resting on cot in lowest position, call light in reach. This patient was assessed by the doctor only. Nurse processed and completed the orders from this doctor ie labs, meds, and/or EKG.       Dahiana Cassidy, LPN  22/04/18 [address] [place] [address], [facility]  44/56/81 [id number]

## 2023-03-18 NOTE — ED PROVIDER NOTES
[facility]     Emergency Department     Faculty Attestation    I performed a history and physical examination of the patient and discussed management with the resident. I reviewed the residents note and agree with the documented findings and plan of care. Any areas of disagreement are noted on the chart. I was personally present for the key portions of any procedures. I have documented in the chart those procedures where I was not present during the key portions. I have reviewed the emergency nurses triage note. I agree with the chief complaint, past medical history, past surgical history, allergies, medications, social and family history as documented unless otherwise noted below. For Physician Assistant/ Nurse Practitioner cases/documentation I have personally evaluated this patient and have completed at least one if not all key elements of the E/M (history, physical exam, and MDM). Additional findings are as noted. Ongoing right knee pain for over a month however significantly worse in the last 2 days. No specific injury she can recall 2 days ago. Already seen Ortho once had negative x-rays at that time has a follow-up appointment next month. No relief with home meds or Ace wrap. On exam knee is stable no effusion distally no calf tenderness strong DP and PT pulses.   Given worsening pain will reimage, plan discharge with continued outpatient orthopedic follow-up      Critical Care     none    Denys Florian MD, Cait Early  Attending Emergency  Physician           Denys Florian MD  03/18/23 0435

## 2023-03-18 NOTE — ED PROVIDER NOTES
[facility]  Emergency Department Encounter  Emergency Medicine Resident     Pt Name:Bushra Sapp  MRN: [id number]  [place] 1965  Date of evaluation: 3/18/23  PCP:  JESIKA Gillette CNP  Note Started: 12:10 PM EDT      CHIEF COMPLAINT     Knee pain  HISTORY OF PRESENT ILLNESS  (Location/Symptom, Timing/Onset, Context/Setting, Quality, Duration, Modifying Factors, Severity.)      Amber Andres is a 65 y.o. female who presents with left knee pain ongoing for the past month. She reports that she was seen by her PCP who did an x-ray and told her it was normal.  She states that when she is walking she sometimes feels her knee catching and is concerned that it will \"pop out \". She notices this more when she is making turns or walking too fast.  She is wearing a knee brace and been taking Motrin with minimal improvement of her symptoms. Is been worsening and she was coming in for further evaluation. PAST MEDICAL / SURGICAL / SOCIAL / FAMILY HISTORY      has a past medical history of Asthma, CVA (cerebral vascular accident) ([address] Hypertension, and Kidney stone. has a past surgical history that includes Cholecystectomy; Lithotripsy; and Rotator cuff repair (Left, 12/2022).     Social History     Socioeconomic History    Marital status: Single     Spouse name: Not on file    Number of children: Not on file    Years of education: Not on file    Highest education level: Not on file   Occupational History    Not on file   Tobacco Use    Smoking status: Never    Smokeless tobacco: Never   Substance and Sexual Activity    Alcohol use: No    Drug use: No    Sexual activity: Not on file   Other Topics Concern    Not on file   Social History Narrative    Not on file     Social Determinants of Health     Financial Resource Strain: Low Risk     Difficulty of Paying Living Expenses: Not hard at all   Food Insecurity: No Food Insecurity    Worried About [address] in the Last Year: Never true    Ran Out of Food in the Last Year: Never true   Transportation Needs: Not on file   Physical Activity: Not on file   Stress: Not on file   Social Connections: Not on file   Intimate Partner Violence: Not on file   Housing Stability: Unknown    Unable to Pay for Housing in the Last Year: Not on file    Number of Swati in the Last Year: Not on file    Unstable Housing in the Last Year: No       No family history on file. Allergies:  Patient has no known allergies. Home Medications:  Prior to Admission medications    Medication Sig Start Date End Date Taking? Authorizing Provider   acetaminophen (TYLENOL) 500 MG tablet Take 1 tablet by mouth 4 times daily as needed for Pain 3/18/23  Yes Ninfa Grijalva,    amLODIPine (NORVASC) 10 MG tablet take 1 tablet by mouth once daily 3/1/23   JESIKA Andersen CNP   methylPREDNISolone (MEDROL, AISHA,) 4 MG tablet Take by mouth. 2/3/23   JESIKA Andersen CNP   HYDROcodone-acetaminophen (NORCO) 5-325 MG per tablet take 1 tablet by mouth every 8 hours AS NEEDED FOR PAIN maximum daily dose of 3 12/22/22   Historical Provider, MD   ketorolac (TORADOL) 10 MG tablet take 1 tablet by mouth every 6 hours AS NEEDED FOR PAIN 12/9/22   Historical Provider, MD   traMADol (ULTRAM) 50 MG tablet Take 50 mg by mouth every 6 hours as needed. 1/11/23   Historical Provider, MD   ibuprofen (ADVIL;MOTRIN) 800 MG tablet Take 0.5 tablets by mouth every 6 hours as needed for Pain 1/20/23   JESIKA Andersen CNP   acetaminophen (TYLENOL) 500 MG tablet Take 1 tablet by mouth 4 times daily as needed for Pain 1/20/23   JESIKA Andersen CNP   betamethasone valerate (VALISONE) 0.1 % lotion Apply topically 2 times daily.  11/16/22   JESIKA Encarnacion CNP   Cholecalciferol (RA VITAMIN D-3) 50 MCG (2000 UT) CAPS take 1 capsule by mouth once daily 6/7/22   JESIKA Martínez CNP   cetirizine (ZYRTEC) 10 MG tablet Take 1 tablet by mouth daily 5/20/22   Arianna Mariscal JESIKA Lewis - CNP   hydrocortisone 2.5 % cream Apply topically 2 times daily. 5/20/22   JESIKA Figueredo CNP   atorvastatin (LIPITOR) 40 MG tablet Take 1 tablet by mouth nightly 2/2/22   Seble Dodson MD   promethazine (PHENERGAN) 12.5 MG tablet Take 1 tablet by mouth every 6 hours as needed for Nausea 12/30/21   Padilla Harrington MD   triamcinolone (KENALOG) 0.025 % ointment apply to affected area twice a day TO RASH FOR UP TO 2 WEEKS. 10/10/19   JESIKA Figueredo CNP       REVIEW OF SYSTEMS       Review of Systems   Constitutional:  Negative for fever. Musculoskeletal:  Positive for arthralgias and gait problem. Neurological:  Negative for weakness and numbness. PHYSICAL EXAM      INITIAL VITALS:   BP (!) 129/91   Pulse 86   Temp 97.3 °F (36.3 °C) (Oral)   Resp 12   SpO2 100%     Physical Exam  Constitutional:       General: She is not in acute distress. HENT:      Head: Normocephalic and atraumatic. Right Ear: External ear normal.      Left Ear: External ear normal.      Nose: Nose normal.   Eyes:      Conjunctiva/sclera: Conjunctivae normal.   Cardiovascular:      Rate and Rhythm: Normal rate. Pulses: Normal pulses. Pulmonary:      Effort: Pulmonary effort is normal. No respiratory distress. Abdominal:      General: Abdomen is flat. There is no distension. Palpations: Abdomen is soft. Tenderness: There is no abdominal tenderness. There is no guarding or rebound. Musculoskeletal:         General: No swelling. Cervical back: No tenderness. Comments: No swelling of bilateral knees, anterior posterior drawer normal, Lachman's test normal, some pain with varus/valgus testing. No pain with patellar motion. No pain along palpation of the joint. Skin:     General: Skin is warm. Capillary Refill: Capillary refill takes less than 2 seconds. Neurological:      Mental Status: She is alert and oriented to person, place, and time.       Comments: Sensation to light touch intact, normal gait. Psychiatric:         Mood and Affect: Mood normal.         DDX/DIAGNOSTIC RESULTS / EMERGENCY DEPARTMENT COURSE / MDM     Medical Decision Making  This is a 60-year-old female with history of stroke presenting today with left knee pain ongoing for over the last month, worsening over last 3 days. Differential including arthritis, gout less likely, meniscal versus ligamentous injury more likely. Patient requesting x-rays today, will need to follow-up with orthopedic surgery for possible MRI. Amount and/or Complexity of Data Reviewed  External Data Reviewed: radiology and notes. Radiology: ordered. Risk  OTC drugs. EMERGENCY DEPARTMENT COURSE:    ED Course as of 03/18/23 1412   Sat Mar 18, 2023   1306 Negative x-ray. Will discharge with orthopedic surgery follow-up. Will likely need an MRI. [ML]      ED Course User Index  [ML] Saima Chaves DO     FINAL IMPRESSION      1. Acute pain of right knee          DISPOSITION / PLAN     DISPOSITION Decision To Discharge 03/18/2023 01:07:04 PM      PATIENT REFERRED TO:  Willow Lara, [address] [facility] [address]  [phone]    Schedule an appointment as soon as possible for a visit   for knee pain    DISCHARGE MEDICATIONS:  Discharge Medication List as of 3/18/2023  1:11 PM        START taking these medications    Details   !! acetaminophen (TYLENOL) 500 MG tablet Take 1 tablet by mouth 4 times daily as needed for Pain, Disp-30 tablet, R-0Normal       !! - Potential duplicate medications found. Please discuss with provider.           Sheree Cruz DO  Emergency Medicine Resident    (Please note that portions of thisnote were completed with a voice recognition program.  Efforts were made to edit the dictations but occasionally words are mis-transcribed.)        Saima Chaves DO  Resident  03/18/23 9189

## 2023-03-18 NOTE — DISCHARGE INSTRUCTIONS
Thank you for coming to [facility] emergency department! You were seen today for knee pain, your X-ray is normal. The knee pain may be due to injury of the ligament or meniscus of the knee. These are not seen on x-ray and would require an MRI. Please follow-up with Dr. Russell Thakkar, your orthopedic surgeon for further evaluation and possibly an MRI. You were prescribed tylenol, please pick these medications up at the pharmacy. Follow up with your primary care doctor. If you have any worsening of symptoms or any other concerns, please return to the emergency department. We are always available!

## 2023-03-20 RX ORDER — ATORVASTATIN CALCIUM 40 MG/1
40 TABLET, FILM COATED ORAL NIGHTLY
Qty: 30 TABLET | Refills: 0 | Status: SHIPPED | OUTPATIENT
Start: 2023-03-20 | End: 2023-04-19

## 2023-03-30 ENCOUNTER — HOSPITAL ENCOUNTER (OUTPATIENT)
Dept: PHYSICAL THERAPY | Age: 58
Setting detail: THERAPIES SERIES
Discharge: HOME | End: 2023-03-30

## 2023-03-30 NOTE — FLOWSHEET NOTE
[x] [facility]) [facility]  [facility]  [address]    [phone]  F: [phone]   [] [address]  [facility] [address]   [address]  P: [phone]  F: [phone]  [] [address]  [address] [facility]  P: [phone]  F: [phone] [] [address]  P: [phone]  F: [phone]  [] [address]  [address] [facility] [address]   [address]   [place]: [phone]  F: [phone]   [] [facility]  [address] [facility] [address]  [place]: [phone]   F: [phone]     Physical Therapy Cancel/No Show note    Date: 3/30/2023  Patient: Akhil Rojas  : 1965  MRN: [id number]    Cancels/No Shows to date:     For today's appointment patient:    []  Cancelled    [] Rescheduled appointment    [x] No-show For Initial Evaluation     Reason given by patient:    []  Patient ill    []  Conflicting appointment    [] No transportation      [] Conflict with work    [] No reason given    [] Weather related    [] [id number]    [] Other:      Comments:        [] Next appointment was confirmed    Electronically signed by: Jennifer Willard PT

## 2023-04-19 ENCOUNTER — HOSPITAL ENCOUNTER (OUTPATIENT)
Age: 58
Setting detail: SPECIMEN
Discharge: HOME | End: 2023-04-19

## 2023-04-19 ENCOUNTER — OFFICE VISIT (OUTPATIENT)
Dept: ORTHOPEDIC SURGERY | Age: 58
End: 2023-04-19

## 2023-04-19 VITALS — BODY MASS INDEX: 29.76 KG/M2 | HEIGHT: 65 IN | WEIGHT: 178.6 LBS

## 2023-04-19 DIAGNOSIS — M25.462 EFFUSION OF LEFT KNEE: ICD-10-CM

## 2023-04-19 DIAGNOSIS — M22.2X2 PATELLOFEMORAL SYNDROME OF LEFT KNEE: Primary | ICD-10-CM

## 2023-04-19 NOTE — PROGRESS NOTES
I performed a history and physical examination of the patient and discussed management with the resident. I reviewed the physician assistant/resident physician note and agree with the documented findings and plan of care. Any areas of disagreement are noted on the chart. I have personally evaluated this patient and have completed at least one if not all key elements of the E/M (history, physical exam, and MDM). Additional findings are as noted. I agree with the chief complaint, past medical history, past surgical history, allergies, medications, social and family history as documented unless otherwise noted below.      Electronically signed by Mira Segundo DO on 4/19/2023 at 9:52 AM
kit 0    HYDROcodone-acetaminophen (NORCO) 5-325 MG per tablet take 1 tablet by mouth every 8 hours AS NEEDED FOR PAIN maximum daily dose of 3      ketorolac (TORADOL) 10 MG tablet take 1 tablet by mouth every 6 hours AS NEEDED FOR PAIN      traMADol (ULTRAM) 50 MG tablet Take 50 mg by mouth every 6 hours as needed. (Patient not taking: Reported on 4/19/2023)      ibuprofen (ADVIL;MOTRIN) 800 MG tablet Take 0.5 tablets by mouth every 6 hours as needed for Pain (Patient not taking: Reported on 4/19/2023) 120 tablet 3    acetaminophen (TYLENOL) 500 MG tablet Take 1 tablet by mouth 4 times daily as needed for Pain (Patient not taking: Reported on 4/19/2023) 120 tablet 0    cetirizine (ZYRTEC) 10 MG tablet Take 1 tablet by mouth daily (Patient not taking: Reported on 4/19/2023) 30 tablet 0    hydrocortisone 2.5 % cream Apply topically 2 times daily. 20 g 0    promethazine (PHENERGAN) 12.5 MG tablet Take 1 tablet by mouth every 6 hours as needed for Nausea 15 tablet 0    triamcinolone (KENALOG) 0.025 % ointment apply to affected area twice a day TO RASH FOR UP TO 2 WEEKS. 15 g 0     No current facility-administered medications for this visit. Allergies:    Patient has no known allergies.     Social History:   Social History     Socioeconomic History    Marital status: Single     Spouse name: Not on file    Number of children: Not on file    Years of education: Not on file    Highest education level: Not on file   Occupational History    Not on file   Tobacco Use    Smoking status: Never    Smokeless tobacco: Never   Substance and Sexual Activity    Alcohol use: No    Drug use: No    Sexual activity: Not on file   Other Topics Concern    Not on file   Social History Narrative    Not on file     Social Determinants of Health     Financial Resource Strain: Low Risk     Difficulty of Paying Living Expenses: Not hard at all   Food Insecurity: No Food Insecurity    Worried About [address] in the Last Year: Never

## 2023-04-20 ENCOUNTER — HOSPITAL ENCOUNTER (OUTPATIENT)
Dept: PHYSICAL THERAPY | Age: 58
Setting detail: THERAPIES SERIES
Discharge: HOME | End: 2023-04-20

## 2023-04-20 LAB
CRYSTALS, FLUID: NEGATIVE
LYMPHOCYTES, BODY FLUID: 35 %
NEUTROPHIL, FLUID: 8 %
OTHER CELLS FLUID: NORMAL %
RBC FLUID: <3000 CELLS/UL
SPECIMEN TYPE: NORMAL
SPECIMEN TYPE: NORMAL
WBC FLUID: 794 CELLS/UL

## 2023-04-20 NOTE — FLOWSHEET NOTE
[x] [address]  [address]    [phone]  F: [phone]   [] [address] [address]   [address]  P: [phone]  F: [phone]  [] [address]  [address] [facility]  P: [phone]  F: [phone] [] [address]: [phone]  F: [phone]  [] [address] [facility]   [address]   [place]: [phone]  F: [phone]   [] [address]  [place]: [phone]   F: [phone]     Physical Therapy Cancel/No Show note    Date: 2023  Patient: Gabriel Santamaria  : 1965  MRN: [id number]    Cancels/No Shows to date:     For today's appointment patient:    [x]  Cancelled    [] Rescheduled appointment    [] No-show      Reason given by patient:    []  Patient ill    []  Conflicting appointment    [] No transportation      [] Conflict with work    [] No reason given    [] Weather related    [] [id number]    [] Other:      Comments:  23 CX brother passed needs to change miguel      [] Next appointment was confirmed    Electronically signed by: Alia Moore PT

## 2023-04-23 LAB
MICROORGANISM SPEC CULT: NORMAL
MICROORGANISM/AGENT SPEC: NORMAL
MICROORGANISM/AGENT SPEC: NORMAL
SPECIMEN DESCRIPTION: NORMAL

## 2023-04-28 ENCOUNTER — HOSPITAL ENCOUNTER (OUTPATIENT)
Dept: PHYSICAL THERAPY | Age: 58
Setting detail: THERAPIES SERIES
Discharge: HOME | End: 2023-04-28

## 2023-05-16 ENCOUNTER — NURSE ONLY (OUTPATIENT)
Dept: PRIMARY CARE CLINIC | Age: 58
End: 2023-05-16
Payer: MEDICAID

## 2023-05-16 DIAGNOSIS — Z11.1 TUBERCULOSIS SCREENING: Primary | ICD-10-CM

## 2023-05-16 PROCEDURE — 99211 OFF/OP EST MAY X REQ PHY/QHP: CPT | Performed by: NURSE PRACTITIONER

## 2023-05-16 PROCEDURE — 86580 TB INTRADERMAL TEST: CPT | Performed by: NURSE PRACTITIONER

## 2023-05-16 NOTE — PROGRESS NOTES
Patient here for a PPD injection. Injection was placed in patient's left forearm. Date & Time was Tuesday, May 16th, 2023 at 11:15am.   Patient was informed to come into the office on Thursday, May 18th, 2023 at 11:15am to get a reading.

## 2023-05-18 ENCOUNTER — NURSE ONLY (OUTPATIENT)
Dept: PRIMARY CARE CLINIC | Age: 58
End: 2023-05-18

## 2023-05-18 DIAGNOSIS — Z11.1 ENCOUNTER FOR PPD SKIN TEST READING: Primary | ICD-10-CM

## 2023-05-18 LAB
INDURATION: 0
TB SKIN TEST: NEGATIVE

## 2023-05-18 NOTE — PROGRESS NOTES
PPD Reading Note  PPD read and results entered in [address]. Result: 03 mm induration. Interpretation: Negative  If test not read within 48-72 hours of initial placement, patient advised to repeat in other arm 1-3 weeks after this test.  Allergic reaction: no       Patient is scheduled for her 2nd TB next week. A copy of her form is scanned in and placed up front in the pt . It will need documentation for next weeks appt.

## 2023-05-23 ENCOUNTER — NURSE ONLY (OUTPATIENT)
Dept: PRIMARY CARE CLINIC | Age: 58
End: 2023-05-23
Payer: MEDICAID

## 2023-05-23 DIAGNOSIS — Z11.1 TUBERCULOSIS SCREENING: Primary | ICD-10-CM

## 2023-05-23 PROCEDURE — 86580 TB INTRADERMAL TEST: CPT | Performed by: NURSE PRACTITIONER

## 2023-05-23 NOTE — PROGRESS NOTES
Patient is here for 2- step TB injection. Injection was placed in patient's right forearm. Date: May 23rd, 2023  Time: 10:45AM  Patient was informed to come back into the office Thursday, May 25th 2023 at 10:45am to get the reading.

## 2023-05-24 ENCOUNTER — TELEPHONE (OUTPATIENT)
Dept: ORTHOPEDIC SURGERY | Age: 58
End: 2023-05-24

## 2023-05-25 ENCOUNTER — NURSE ONLY (OUTPATIENT)
Dept: PRIMARY CARE CLINIC | Age: 58
End: 2023-05-25
Payer: MEDICAID

## 2023-05-25 DIAGNOSIS — Z11.1 TUBERCULOSIS SCREENING: Primary | ICD-10-CM

## 2023-05-25 PROCEDURE — 99211 OFF/OP EST MAY X REQ PHY/QHP: CPT | Performed by: NURSE PRACTITIONER

## 2023-05-25 NOTE — PROGRESS NOTES
PPD Reading Note  [facility] Result: 0 mm induration.   Interpretation: neg  If test not read within 48-72 hours of initial placement, patient advised to repeat in other arm 1-3 weeks after this test.  Allergic reaction: no

## 2023-06-06 DIAGNOSIS — Z76.0 MEDICATION REFILL: ICD-10-CM

## 2023-06-06 DIAGNOSIS — I10 ESSENTIAL HYPERTENSION: ICD-10-CM

## 2023-06-06 RX ORDER — AMLODIPINE BESYLATE 10 MG/1
10 TABLET ORAL DAILY
Qty: 90 TABLET | Refills: 0 | OUTPATIENT
Start: 2023-06-06

## 2023-06-19 DIAGNOSIS — I10 ESSENTIAL HYPERTENSION: ICD-10-CM

## 2023-06-19 DIAGNOSIS — E78.5 DYSLIPIDEMIA: ICD-10-CM

## 2023-06-19 DIAGNOSIS — Z76.0 MEDICATION REFILL: ICD-10-CM

## 2023-06-19 RX ORDER — ATORVASTATIN CALCIUM 40 MG/1
40 TABLET, FILM COATED ORAL NIGHTLY
Qty: 30 TABLET | Refills: 0 | Status: SHIPPED | OUTPATIENT
Start: 2023-06-19 | End: 2023-07-14

## 2023-06-19 RX ORDER — GLUCOSAMINE/CHONDR SU A SOD 750-600 MG
TABLET ORAL
Qty: 30 CAPSULE | Refills: 2 | Status: SHIPPED | OUTPATIENT
Start: 2023-06-19

## 2023-06-19 RX ORDER — AMLODIPINE BESYLATE 10 MG/1
10 TABLET ORAL DAILY
Qty: 90 TABLET | Refills: 0 | Status: SHIPPED | OUTPATIENT
Start: 2023-06-19

## 2023-07-14 DIAGNOSIS — E78.5 DYSLIPIDEMIA: ICD-10-CM

## 2023-07-14 RX ORDER — ATORVASTATIN CALCIUM 40 MG/1
TABLET, FILM COATED ORAL
Qty: 30 TABLET | Refills: 0 | Status: SHIPPED | OUTPATIENT
Start: 2023-07-14

## 2023-07-15 DIAGNOSIS — E78.5 DYSLIPIDEMIA: ICD-10-CM

## 2023-07-17 RX ORDER — ATORVASTATIN CALCIUM 40 MG/1
TABLET, FILM COATED ORAL
Qty: 30 TABLET | Refills: 0 | Status: SHIPPED | OUTPATIENT
Start: 2023-07-17

## 2023-08-17 ENCOUNTER — OFFICE VISIT (OUTPATIENT)
Dept: PRIMARY CARE CLINIC | Age: 58
End: 2023-08-17
Payer: MEDICAID

## 2023-08-17 VITALS
HEART RATE: 74 BPM | SYSTOLIC BLOOD PRESSURE: 135 MMHG | OXYGEN SATURATION: 100 % | WEIGHT: 177.6 LBS | DIASTOLIC BLOOD PRESSURE: 89 MMHG | BODY MASS INDEX: 29.55 KG/M2

## 2023-08-17 DIAGNOSIS — E78.5 DYSLIPIDEMIA: ICD-10-CM

## 2023-08-17 DIAGNOSIS — Z28.21 PNEUMOCOCCAL VACCINATION DECLINED: ICD-10-CM

## 2023-08-17 DIAGNOSIS — Z00.00 ENCOUNTER FOR WELL ADULT EXAM WITHOUT ABNORMAL FINDINGS: Primary | ICD-10-CM

## 2023-08-17 DIAGNOSIS — L70.0 BLACKHEAD: ICD-10-CM

## 2023-08-17 DIAGNOSIS — Z12.31 ENCOUNTER FOR SCREENING MAMMOGRAM FOR BREAST CANCER: ICD-10-CM

## 2023-08-17 DIAGNOSIS — I10 ESSENTIAL HYPERTENSION: ICD-10-CM

## 2023-08-17 DIAGNOSIS — Z23 NEED FOR PROPHYLACTIC VACCINATION WITH TETANUS-DIPHTHERIA (TD): ICD-10-CM

## 2023-08-17 PROCEDURE — 90471 IMMUNIZATION ADMIN: CPT | Performed by: NURSE PRACTITIONER

## 2023-08-17 PROCEDURE — 90715 TDAP VACCINE 7 YRS/> IM: CPT | Performed by: NURSE PRACTITIONER

## 2023-08-17 PROCEDURE — 3079F DIAST BP 80-89 MM HG: CPT | Performed by: NURSE PRACTITIONER

## 2023-08-17 PROCEDURE — 3075F SYST BP GE 130 - 139MM HG: CPT | Performed by: NURSE PRACTITIONER

## 2023-08-17 PROCEDURE — 99396 PREV VISIT EST AGE 40-64: CPT | Performed by: NURSE PRACTITIONER

## 2023-08-17 SDOH — ECONOMIC STABILITY: INCOME INSECURITY: HOW HARD IS IT FOR YOU TO PAY FOR THE VERY BASICS LIKE FOOD, HOUSING, MEDICAL CARE, AND HEATING?: NOT VERY HARD

## 2023-08-17 SDOH — ECONOMIC STABILITY: FOOD INSECURITY: WITHIN THE PAST 12 MONTHS, THE FOOD YOU BOUGHT JUST DIDN'T LAST AND YOU DIDN'T HAVE MONEY TO GET MORE.: NEVER TRUE

## 2023-08-17 SDOH — ECONOMIC STABILITY: FOOD INSECURITY: WITHIN THE PAST 12 MONTHS, YOU WORRIED THAT YOUR FOOD WOULD RUN OUT BEFORE YOU GOT MONEY TO BUY MORE.: NEVER TRUE

## 2023-08-17 ASSESSMENT — ENCOUNTER SYMPTOMS
TROUBLE SWALLOWING: 0
VOMITING: 0
DIARRHEA: 0
ABDOMINAL PAIN: 0
WHEEZING: 0
SHORTNESS OF BREATH: 0
BLOOD IN STOOL: 0

## 2023-08-17 NOTE — PROGRESS NOTES
Well Adult Note  Name: Bryan Hendricks Date: 2023   MRN: [id number] Sex: Female   Age: 63 y.o. Ethnicity: Non- / Non    : 1965 Race: Alexander Stone / Madisyn Reinoso is here for well adult exam.  History:  No hx of seizures, LOC, Syncope or chronic back pain. Needing work physical as an [employer]. Review of Systems   Constitutional:  Negative for appetite change, fever and unexpected weight change. HENT:  Negative for hearing loss and trouble swallowing. Eyes:  Negative for visual disturbance. Respiratory:  Negative for shortness of breath and wheezing. Cardiovascular:  Negative for chest pain and palpitations. Gastrointestinal:  Negative for abdominal pain, blood in stool, diarrhea and vomiting. Endocrine: Negative for polydipsia and polyuria. Genitourinary:  Negative for frequency and hematuria. Musculoskeletal:  Negative for myalgias and neck pain. Neurological:  Negative for seizures, numbness and headaches. Psychiatric/Behavioral:  Negative for suicidal ideas. The patient is not nervous/anxious. No Known Allergies      Prior to Visit Medications    Medication Sig Taking? Authorizing Provider   atorvastatin (LIPITOR) 40 MG tablet take 1 tablet by mouth at bedtime Yes JESIKA Randolph CNP   Cholecalciferol (RA VITAMIN D-3) 50 MCG ( UT) CAPS take 1 capsule by mouth once daily Yes JESIKA Duckworth CNP   amLODIPine (NORVASC) 10 MG tablet Take 1 tablet by mouth daily Yes JESIKA Duckworth CNP   betamethasone valerate (VALISONE) 0.1 % lotion Apply topically 2 times daily. Yes April JESIKA Reyes CNP   hydrocortisone 2.5 % cream Apply topically 2 times daily.  Yes JESIKA Duckworth CNP         Past Medical History:   Diagnosis Date    Asthma     CVA (cerebral vascular accident) [facility])     Hypertension     Kidney stone        Past Surgical History:   Procedure Laterality Date    CHOLECYSTECTOMY      LITHOTRIPSY

## 2023-09-12 DIAGNOSIS — I10 ESSENTIAL HYPERTENSION: ICD-10-CM

## 2023-09-12 DIAGNOSIS — Z76.0 MEDICATION REFILL: ICD-10-CM

## 2023-09-12 RX ORDER — AMLODIPINE BESYLATE 10 MG/1
10 TABLET ORAL DAILY
Qty: 90 TABLET | Refills: 0 | Status: SHIPPED | OUTPATIENT
Start: 2023-09-12

## 2023-09-19 ENCOUNTER — HOSPITAL ENCOUNTER (OUTPATIENT)
Age: 58
Discharge: HOME | End: 2023-09-19
Payer: MEDICAID

## 2023-09-19 DIAGNOSIS — I10 ESSENTIAL HYPERTENSION: ICD-10-CM

## 2023-09-19 DIAGNOSIS — E78.5 DYSLIPIDEMIA: ICD-10-CM

## 2023-09-19 LAB
ANION GAP SERPL CALCULATED.3IONS-SCNC: 9 MMOL/L (ref 9–17)
BUN SERPL-MCNC: 10 MG/DL (ref 6–20)
CALCIUM SERPL-MCNC: 10.8 MG/DL (ref 8.6–10.4)
CHLORIDE SERPL-SCNC: 105 MMOL/L (ref 98–107)
CHOLEST SERPL-MCNC: 167 MG/DL
CHOLESTEROL/HDL RATIO: 2.3
CO2 SERPL-SCNC: 24 MMOL/L (ref 20–31)
CREAT SERPL-MCNC: 1.2 MG/DL (ref 0.5–0.9)
GFR SERPL CREATININE-BSD FRML MDRD: 53 ML/MIN/1.73M2
GLUCOSE P FAST SERPL-MCNC: 93 MG/DL (ref 70–99)
HDLC SERPL-MCNC: 72 MG/DL
LDLC SERPL CALC-MCNC: 78 MG/DL (ref 0–130)
POTASSIUM SERPL-SCNC: 4 MMOL/L (ref 3.7–5.3)
SODIUM SERPL-SCNC: 138 MMOL/L (ref 135–144)
TRIGL SERPL-MCNC: 83 MG/DL

## 2023-09-19 PROCEDURE — 36415 COLL VENOUS BLD VENIPUNCTURE: CPT

## 2023-09-19 PROCEDURE — 80048 BASIC METABOLIC PNL TOTAL CA: CPT

## 2023-09-19 PROCEDURE — 80061 LIPID PANEL: CPT

## 2023-09-25 ENCOUNTER — TELEPHONE (OUTPATIENT)
Dept: GASTROENTEROLOGY | Age: 58
End: 2023-09-25

## 2023-09-25 DIAGNOSIS — E83.52 HYPERCALCEMIA: Primary | ICD-10-CM

## 2023-09-25 NOTE — TELEPHONE ENCOUNTER
Patient called in regarding her blood test (BMP) which patient was concern of her elevated creatine level. Patient was requesting another BMP to be ordered, patient was notified based on  insurance  the test may not be covered due to just having test done.     Patient voiced understanding and wanted your input on her recent lab test.

## 2023-09-25 NOTE — TELEPHONE ENCOUNTER
Please let Elizabeth Lopez know that I am aware of the changes in her lab results, more so I was concerned with the persistent elevation in calcium levels and was reviewing lab work to further evaluate. Mild kidney disease can cause elevations in calcium, however they have been persistently elevated despite normal levels previously.   I am pending other labs including repeat BMP as well as evaluating parathyroid hormone and vitamin D.

## 2023-09-26 ENCOUNTER — TELEPHONE (OUTPATIENT)
Dept: PRIMARY CARE CLINIC | Age: 58
End: 2023-09-26

## 2023-09-26 ENCOUNTER — HOSPITAL ENCOUNTER (OUTPATIENT)
Age: 58
Discharge: HOME | End: 2023-09-26
Payer: MEDICAID

## 2023-09-26 DIAGNOSIS — N18.31 STAGE 3A CHRONIC KIDNEY DISEASE (HCC): ICD-10-CM

## 2023-09-26 DIAGNOSIS — E83.52 HYPERCALCEMIA: Primary | ICD-10-CM

## 2023-09-26 DIAGNOSIS — E83.52 HYPERCALCEMIA: ICD-10-CM

## 2023-09-26 LAB
25(OH)D3 SERPL-MCNC: 51.3 NG/ML
ALBUMIN SERPL-MCNC: 4.6 G/DL (ref 3.5–5.2)
ALBUMIN/GLOB SERPL: 1.1 {RATIO} (ref 1–2.5)
ALP SERPL-CCNC: 213 U/L (ref 35–104)
ALT SERPL-CCNC: 18 U/L (ref 5–33)
ANION GAP SERPL CALCULATED.3IONS-SCNC: 10 MMOL/L (ref 9–17)
AST SERPL-CCNC: 20 U/L
BILIRUB SERPL-MCNC: 0.6 MG/DL (ref 0.3–1.2)
BUN SERPL-MCNC: 10 MG/DL (ref 6–20)
CALCIUM SERPL-MCNC: 11 MG/DL (ref 8.6–10.4)
CHLORIDE SERPL-SCNC: 104 MMOL/L (ref 98–107)
CO2 SERPL-SCNC: 25 MMOL/L (ref 20–31)
CREAT SERPL-MCNC: 1.2 MG/DL (ref 0.5–0.9)
GFR SERPL CREATININE-BSD FRML MDRD: 53 ML/MIN/1.73M2
GLUCOSE SERPL-MCNC: 90 MG/DL (ref 70–99)
POTASSIUM SERPL-SCNC: 3.7 MMOL/L (ref 3.7–5.3)
PROT SERPL-MCNC: 8.8 G/DL (ref 6.4–8.3)
PTH-INTACT SERPL-MCNC: 56.2 PG/ML (ref 14–72)
SODIUM SERPL-SCNC: 139 MMOL/L (ref 135–144)

## 2023-09-26 PROCEDURE — 82306 VITAMIN D 25 HYDROXY: CPT

## 2023-09-26 PROCEDURE — 80053 COMPREHEN METABOLIC PANEL: CPT

## 2023-09-26 PROCEDURE — 83970 ASSAY OF PARATHORMONE: CPT

## 2023-09-26 PROCEDURE — 36415 COLL VENOUS BLD VENIPUNCTURE: CPT

## 2023-09-26 NOTE — TELEPHONE ENCOUNTER
Please notify Justin Gant that her lab results returned. There is no issues with her parathyroid hormone or vitamin D deficiency. She continues to have elevated calcium levels, which I do suspect is secondary to the mild chronic kidney disease. I am going to place referral to nephrology, which is a kidney specialist.  I do encourage her to avoid antiinflammatory medication such as Advil, Aleve or ibuprofen as these can cause the kidneys to worsen and function.

## 2023-09-27 ENCOUNTER — TELEPHONE (OUTPATIENT)
Dept: PRIMARY CARE CLINIC | Age: 58
End: 2023-09-27

## 2023-09-27 DIAGNOSIS — E83.52 HYPERCALCEMIA: Primary | ICD-10-CM

## 2023-09-27 DIAGNOSIS — N18.31 STAGE 3A CHRONIC KIDNEY DISEASE (HCC): ICD-10-CM

## 2023-09-27 NOTE — TELEPHONE ENCOUNTER
New referral placed to [facility] nephrology, Dr. Michelle Velazquez office. We can provide her with the number to contact their office and verify they accept her insurance. While this is discouraging, it is not uncommon for nephrology offices to be scheduling 4 to 5 months out at this time. Her condition is stable and has not significantly worsened over the past few months.

## 2023-09-27 NOTE — TELEPHONE ENCOUNTER
----- Message from Shahana Monaco sent at 9/27/2023  9:53 AM EDT -----  Subject: Referral Request    Reason for referral request? Pt is requesting a new nephrology referral as   the one she was referred to is booked until February of 2024. Please   contact pt. Provider patient wants to be referred to(if known):     Provider Phone Number(if known):     Additional Information for Provider?   ---------------------------------------------------------------------------  --------------  Evangelista Marine Cynthia    [phone]; OK to leave message on voicemail  ---------------------------------------------------------------------------  --------------

## 2023-10-02 DIAGNOSIS — Z76.0 MEDICATION REFILL: ICD-10-CM

## 2023-10-02 RX ORDER — ACETAMINOPHEN 160 MG
TABLET,DISINTEGRATING ORAL
Qty: 30 CAPSULE | Refills: 2 | Status: SHIPPED | OUTPATIENT
Start: 2023-10-02

## 2023-10-17 ENCOUNTER — OFFICE VISIT (OUTPATIENT)
Dept: PRIMARY CARE CLINIC | Age: 58
End: 2023-10-17
Payer: MEDICAID

## 2023-10-17 VITALS
HEART RATE: 74 BPM | WEIGHT: 176.2 LBS | OXYGEN SATURATION: 93 % | BODY MASS INDEX: 29.32 KG/M2 | DIASTOLIC BLOOD PRESSURE: 89 MMHG | SYSTOLIC BLOOD PRESSURE: 136 MMHG

## 2023-10-17 DIAGNOSIS — Z28.21 INFLUENZA VACCINATION DECLINED: ICD-10-CM

## 2023-10-17 DIAGNOSIS — N18.31 STAGE 3A CHRONIC KIDNEY DISEASE (HCC): ICD-10-CM

## 2023-10-17 DIAGNOSIS — E83.52 HYPERCALCEMIA: ICD-10-CM

## 2023-10-17 DIAGNOSIS — E78.5 DYSLIPIDEMIA: ICD-10-CM

## 2023-10-17 DIAGNOSIS — N63.11 BREAST LUMP ON RIGHT SIDE AT 11 O'CLOCK POSITION: ICD-10-CM

## 2023-10-17 DIAGNOSIS — I10 ESSENTIAL HYPERTENSION: Primary | ICD-10-CM

## 2023-10-17 PROBLEM — M75.02 ADHESIVE CAPSULITIS OF LEFT SHOULDER: Status: ACTIVE | Noted: 2022-11-16

## 2023-10-17 PROBLEM — S46.012A TRAUMATIC COMPLETE TEAR OF LEFT ROTATOR CUFF: Status: ACTIVE | Noted: 2022-03-04

## 2023-10-17 PROCEDURE — 3075F SYST BP GE 130 - 139MM HG: CPT | Performed by: NURSE PRACTITIONER

## 2023-10-17 PROCEDURE — 3079F DIAST BP 80-89 MM HG: CPT | Performed by: NURSE PRACTITIONER

## 2023-10-17 PROCEDURE — 99214 OFFICE O/P EST MOD 30 MIN: CPT | Performed by: NURSE PRACTITIONER

## 2023-10-17 RX ORDER — AMLODIPINE BESYLATE 5 MG/1
TABLET ORAL
COMMUNITY
Start: 2023-10-12 | End: 2023-10-17 | Stop reason: ALTCHOICE

## 2023-10-17 RX ORDER — ATORVASTATIN CALCIUM 40 MG/1
40 TABLET, FILM COATED ORAL NIGHTLY
Qty: 30 TABLET | Refills: 5 | Status: SHIPPED | OUTPATIENT
Start: 2023-10-17

## 2023-10-17 ASSESSMENT — ENCOUNTER SYMPTOMS
DIARRHEA: 0
VOMITING: 0
ABDOMINAL PAIN: 0
WHEEZING: 0
BLOOD IN STOOL: 0
TROUBLE SWALLOWING: 0
SHORTNESS OF BREATH: 0

## 2023-10-17 NOTE — PROGRESS NOTES
[address] [facility]  [address] [facility] [address]  Dept: [phone]  Dept Fax: [phone]  (:  1965) is a 63 y.o. female,Established patient, here for evaluation of the following chief complaint(s):  Follow-up (Patient is here today for a follow up on blood work )    Supriya Alex is a 81-year-old female here today for routine follow-up for hypertension. History of CKD, follows with [facility] nephrology. Last seen 10/12/2023. Currently with hypercalcemia, high suspicion for primary hyperparathyroidism. Nephrology ordered 24-hour urine collection. ASSESSMENT/PLAN:  1. Essential hypertension  2. Stage 3a chronic kidney disease ([address])  3. Hypercalcemia  4. Breast lump on right side at 11 o'clock position  -     [facility] W OR WO CAD BILATERAL; Future  5. Dyslipidemia  -     atorvastatin (LIPITOR) 40 MG tablet; Take 1 tablet by mouth nightly at bedtime. , Disp-30 tablet, R-5Normal  6. Influenza vaccination declined    Blood pressure at goal today, discussed centrally adding Losartan for kidney protection if she becomes uncontrolled. Did not palpate lump in breast today, however given changes in family history and patient reported lump will order diagnostic mammogram    Return in about 6 months (around 2024) for HTN. Subjective   SUBJECTIVE/OBJECTIVE:  Supriya Alex is a 63 y.o. female who presents for evaluation of hypertension and hyperlipidemia. She indicates that she is feeling well and denies any symptoms referable to her elevated blood pressure. Specifically denies chest pain, palpitations, dyspnea, orthopnea, PND or peripheral edema. No anorexia, arthralgia, or leg cramps noted. Current medication regimen is as listed below. She denies any side effects of medication, and has been taking it regularly.     Asking about mammogram as she found a lump in her right

## 2023-11-06 ENCOUNTER — TELEPHONE (OUTPATIENT)
Dept: PRIMARY CARE CLINIC | Age: 58
End: 2023-11-06

## 2023-11-06 NOTE — TELEPHONE ENCOUNTER
Please clarify the patient's request.  I am reviewing results now and I have no recent labs or imaging results for her in epic. Her most recent x-ray was in March. I ordered a mammogram for her to be completed, for which she will also have an ultrasound done at the same time. I see that is scheduled for tomorrow.

## 2023-11-06 NOTE — TELEPHONE ENCOUNTER
Pt states her kidney dr ordered it .  I did advise her to contact them to get results since u are not the ordering provider

## 2023-12-06 ENCOUNTER — OFFICE VISIT (OUTPATIENT)
Dept: ORTHOPEDIC SURGERY | Age: 58
End: 2023-12-06

## 2023-12-06 VITALS — WEIGHT: 176 LBS | HEIGHT: 65 IN | BODY MASS INDEX: 29.32 KG/M2

## 2023-12-06 DIAGNOSIS — M17.0 BILATERAL PRIMARY OSTEOARTHRITIS OF KNEE: ICD-10-CM

## 2023-12-06 DIAGNOSIS — R52 PAIN: Primary | ICD-10-CM

## 2023-12-07 RX ORDER — METHYLPREDNISOLONE ACETATE 80 MG/ML
80 INJECTION, SUSPENSION INTRA-ARTICULAR; INTRALESIONAL; INTRAMUSCULAR; SOFT TISSUE ONCE
Status: SHIPPED | OUTPATIENT
Start: 2023-12-07

## 2023-12-07 RX ORDER — BUPIVACAINE HYDROCHLORIDE 2.5 MG/ML
2 INJECTION, SOLUTION INFILTRATION; PERINEURAL ONCE
Status: SHIPPED | OUTPATIENT
Start: 2023-12-07

## 2023-12-07 ASSESSMENT — ENCOUNTER SYMPTOMS
COUGH: 0
RESPIRATORY NEGATIVE: 1
CHEST TIGHTNESS: 0
SHORTNESS OF BREATH: 0

## 2023-12-09 DIAGNOSIS — Z76.0 MEDICATION REFILL: ICD-10-CM

## 2023-12-09 DIAGNOSIS — I10 ESSENTIAL HYPERTENSION: ICD-10-CM

## 2023-12-11 RX ORDER — AMLODIPINE BESYLATE 10 MG/1
10 TABLET ORAL DAILY
Qty: 90 TABLET | Refills: 0 | Status: SHIPPED | OUTPATIENT
Start: 2023-12-11

## 2023-12-27 ENCOUNTER — HOSPITAL ENCOUNTER (OUTPATIENT)
Dept: PHYSICAL THERAPY | Age: 58
Setting detail: THERAPIES SERIES
Discharge: HOME | End: 2023-12-27
Payer: MEDICAID

## 2023-12-27 PROCEDURE — 97110 THERAPEUTIC EXERCISES: CPT

## 2023-12-27 NOTE — FLOWSHEET NOTE
[x] [address]  [address] [facility] [place] [facility].  [phone]  F: [phone] [] [address] [facility]  [address]  P: [phone]  F: [phone] [] [address]  [address] [facility]  P: [phone]  F: [phone] [] [facility]: [phone]  F: [phone] [] [address]  [address] [facility]   [address]   P: [phone]  F: [phone]  [] [address] [facility].   P: [phone]  F: [phone] [] [address] [facility]  [address]  P: [phone]  F: [phone] [] [address] [facility]  [address] [facility]  [place]: [phone]  F: [phone] [] [address] [place] [facility] [address]  [place]: [phone]  F: [phone]      Physical Therapy Daily Treatment Note    Date:  2023  Patient Name:  Malgorzata Tapia    :  1965  MRN: [id number]  Physician: Yamileth Rodriguez DO                         Insurance: [employer] Medicaid ([address] after 30th visit)  Medical Diagnosis: Bilateral knee OA                        Rehab Codes: M25.561, M25.562, M25.661, M62.81  Onset date: 3/1/2022                         Next Dr's appt. : 24  Visit# / total visits: 3/12     Cancels/No Shows: 0/0    Subjective:    Pain:  [x] Yes  [] No  Location: B knees   Pain Rating: (0-10 scale) 0/10  Pain altered Tx:  [x] No  [] Yes  Action:  Comments:  Doing well. States that the injections and the therapy must be working, no pain this morning.     Objective:  Modalities: Vaso compression L

## 2023-12-29 ENCOUNTER — HOSPITAL ENCOUNTER (OUTPATIENT)
Dept: PHYSICAL THERAPY | Age: 58
Setting detail: THERAPIES SERIES
Discharge: HOME | End: 2023-12-29
Payer: MEDICAID

## 2023-12-29 NOTE — FLOWSHEET NOTE
[x] [facility]  Outpatient Rehabilitation &  Therapy  [address]  [phone]  F: [phone]             Therapy Cancel/No Show note    Date: 2023  Patient: Bushra Jarrell  : 1965  MRN: [id number]    Cancels/No Shows to date:     For today's appointment patient:    [x]  Cancelled    [] Rescheduled appointment    [] No-show     Reason given by patient:    [x]  Patient ill    []  Conflicting appointment    [] No transportation      [] Conflict with work    [] No reason given    [] Weather related    [] COVID-19    [x] Other:      Comments:  Pt states she will be here next week.      [x] Next appointment was confirmed-at previous Rx    Electronically signed by: MARISABEL BARRIOS PT

## 2024-01-03 ENCOUNTER — HOSPITAL ENCOUNTER (OUTPATIENT)
Dept: PHYSICAL THERAPY | Age: 59
Setting detail: THERAPIES SERIES
Discharge: HOME | End: 2024-01-03
Payer: MEDICAID

## 2024-01-03 PROCEDURE — 97110 THERAPEUTIC EXERCISES: CPT

## 2024-01-03 NOTE — FLOWSHEET NOTE
[x] [facility]  Outpatient Rehabilitation &  Therapy  [address]  [phone]  F: [phone]             Physical Therapy Daily Treatment Note    Date:  1/3/2024  Patient Name:  Bushra Jarrell    :  1965  MRN: [id number]  Physician: Anthony Patel DO                         Insurance: [facility] Medicaid (Auth after 30th visit)  Medical Diagnosis: Bilateral knee OA                        Rehab Codes: M25.561, M25.562, M25.661, M62.81  Onset date: 3/1/2022                         Next Dr's appt.: 24  Visit# / total visits:      Cancels/No Shows:     Subjective:    Pain:  [x] Yes  [] No  Location: B knees   Pain Rating: (0-10 scale) 0/10  Pain altered Tx:  [x] No  [] Yes  Action:  Comments: Pt reports yesterday R knee was \"not so good,\" painful in post knee at 5/10, unknown cause.   No pain at start of Rx today.      Objective:  Modalities: Vaso compression L knee 38 deg Mod pressure x10 min  Precautions:  Exercises:  Exercise Reps/ Time Weight/ Level Comments   NuStep 5' Level 3          Standing      Incline stretch 3x30\"     Heel raises 20x     Marches 20x     Hip abd 15x  Progressed reps 1/3   Hip ext 15xea  Progressed reps 1/3   Hip flex 15xea  Progressed reps 1/3   Hamstring curls 15xea  Progressed reps 1/3   Step ups 10xea 6\"    Lateral step ups 10xea 6\"                      Seated      Hamstring stretch 3x20\"     LAQ 15x  Ball b/w knee, Progressed reps 1/3   Marches 20x     HS curls 15x blue          Supine      Quad sets HEP     Bridges 20x     SLR 15x  Progressed reps 1/3   SLR w/ ER 10x     SAQ 20xea  Progressed reps 1/3         SL hip abd 15x  Progressed reps 1/3   SL clams 10x blue          Prone hip ext  10x     Prone HS curls 15x  Progressed reps 1/3         Other:      Treatment Charges: Mins Units   []  Modalities     [x]  Ther Exercise 40 3   []  Manual Therapy     []  Ther Activities     []  Neuro Re-ed     []  Vasocompression     [] Gait     []

## 2024-01-06 NOTE — PATIENT INSTRUCTIONS
The patient's goals for the shift include      The clinical goals for the shift include Pt will remain safe and free from injury throughout shift.     Well Visit, Ages 25 to 72: Care Instructions  Well visits can help you stay healthy. Your doctor has checked your overall health and may have suggested ways to take good care of yourself. Your doctor also may have recommended tests. You can help prevent illness with healthy eating, good sleep, vaccinations, regular exercise, and other steps. Get the tests that you and your doctor decide on. Depending on your age and risks, examples might include screening for diabetes; hepatitis C; HIV; and cervical, breast, lung, and colon cancer. Screening helps find diseases before any symptoms appear. Eat healthy foods. Choose fruits, vegetables, whole grains, lean protein, and low-fat dairy foods. Limit saturated fat and reduce salt. Limit alcohol. Men should have no more than 2 drinks a day. Women should have no more than 1. For some people, no alcohol is the best choice. Exercise. Get at least 30 minutes of exercise on most days of the week. Walking can be a good choice. Reach and stay at your healthy weight. This will lower your risk for many health problems. Take care of your mental health. Try to stay connected with friends, family, and community, and find ways to manage stress. If you're feeling depressed or hopeless, talk to someone. A counselor can help. If you don't have a counselor, talk to your doctor. Talk to your doctor if you think you may have a problem with alcohol or drug use. This includes prescription medicines and illegal drugs. Avoid tobacco and nicotine: Don't smoke, vape, or chew. If you need help quitting, talk to your doctor. Practice safer sex. Getting tested, using condoms or dental dams, and limiting sex partners can help prevent STIs. Use birth control if it's important to you to prevent pregnancy. Talk with your doctor about your choices and what might be best for you. Prevent problems where you can.  Protect your skin from too much sun, wash your hands, brush

## 2024-01-12 ENCOUNTER — HOSPITAL ENCOUNTER (OUTPATIENT)
Dept: PHYSICAL THERAPY | Age: 59
Setting detail: THERAPIES SERIES
Discharge: HOME | End: 2024-01-12
Payer: MEDICAID

## 2024-01-12 PROCEDURE — 97110 THERAPEUTIC EXERCISES: CPT

## 2024-01-12 NOTE — FLOWSHEET NOTE
Therapy     []  Ther Activities     []  Neuro Re-ed     []  Vasocompression     [] Gait     []  Other     Total Billable time 44 3       Assessment: [x] Progressing toward goals.  Added weight to several mat exercises as listed above without difficulty.  Pt reports muscle fatigue after session but no increase in symptoms.      [] No change.     [] Other:  [x] Patient would continue to benefit from skilled physical therapy services in order to: address bilateral knee pain, bilateral knee stiffness (primarily right knee extension), bilateral knee and hip weakness especially the quads, and functional impairments including difficulty walking longer distances, standing for longer periods of time, and going up and down stairs.         Problem list, as detailed above:   [x] ? Pain                       [x] ? ROM                      [x] ? Strength                 [x] ? Function:   [] ? Balance  [x] Edema  [] Postural Deviations  [] Gait Deviations  [] Other               STG: (to be met in 8 treatments)  ? Pain: Decrease pain to 1/10 on average  ? ROM: Increase right knee extension to 0°, increase bilateral knee flexion to 130°  ? Strength: Increase bilateral knee MMT quads 4/5, hamstrings 5/5; Pt will be able to perform a SLR through full ROM bilaterally without quad lag  ? Function: Improve LEFS to 37% functional impairment  Patient to be independent with home exercise program as demonstrated by performance with correct form without cues.  LTG: (to be met in 12 treatments)  Improve knee extension MMT to 4+/5 bilaterally; Pt will be able to perform 10 reps of straight leg raises bilaterally  Pt will be able to walk a short distance without difficulty  Pt will be able to walk a mile with little difficulty  Pt will be able to lift an object with little difficulty  Improve LEFS to 30% impairment                    Patient goals:To be able to work without having pain.      Rehab Potential:  [x] Good  [] Fair  [] Poor

## 2024-01-24 ENCOUNTER — OFFICE VISIT (OUTPATIENT)
Dept: ORTHOPEDIC SURGERY | Age: 59
End: 2024-01-24
Payer: MEDICAID

## 2024-01-24 DIAGNOSIS — M17.0 BILATERAL PRIMARY OSTEOARTHRITIS OF KNEE: Primary | ICD-10-CM

## 2024-01-24 PROCEDURE — 99213 OFFICE O/P EST LOW 20 MIN: CPT | Performed by: STUDENT IN AN ORGANIZED HEALTH CARE EDUCATION/TRAINING PROGRAM

## 2024-01-24 NOTE — PROGRESS NOTES
[facility] ORTHO SPECIALISTS  [address]  [place] [address]  Dept: [phone]  Dept Fax: [phone]        Ambulatory Follow Up    Subjective:   Bushra Jarrell is a 58 y.o. year old female who presents to our office today for routine followup regarding her bilateral knee osteoarthritis. Patient received bilateral CSI on her last visit on 12/8/2023 with excellent relief. She has also been attending PT and doing very well. She is very happy with her pain relief thus far. Reported significant relief from CSI for approximately 2 months. No new complaints.     Chief Complaint   Patient presents with    Knee Pain     B/L knee pain R>L        HPI    Review of Systems   Constitutional: Negative for fever and chills.   HENT: Negative for congestion.    Eyes: Negative for blurred vision and double vision.   Respiratory: Negative for cough, shortness of breath and wheezing.    Cardiovascular: Negative for chest pain and palpitations.   Gastrointestinal: Negative for nausea. Negative for vomiting.   Musculoskeletal: Positive for bilateral knee pain  Skin: Negative for itching and rash.   Neurological: Negative for dizziness, sensory change and headaches.   Psychiatric/Behavioral: Negative for depression and suicidal ideas.     Objective :   General: AAOx3, NAD, appears stated age  Ortho Exam  BLE: Skin intact. No open wounds or lesions. Knee stable to varus/valgus stress at 0 and 30 degrees. Compartments soft. 2+ DP pulse. TA/EHL/FHL/GS motor intact. Deep and Superficial Peroneal/Saphenous/Sural SILT.  CV: no obvious JVD, no dependent edema, distal pulses 2+  Respiratory: chest rise symmetric, unlabored respirations, no audible wheezing  Skin: warm, well perfused, no obvious rashes or lesions  Psych: Patient displays understanding of exam, diagnosis, and plan.    Radiology:   No new imaging     Assessment:      Diagnosis Orders   1. Bilateral primary

## 2024-02-07 ENCOUNTER — OFFICE VISIT (OUTPATIENT)
Dept: PRIMARY CARE CLINIC | Age: 59
End: 2024-02-07
Payer: MEDICAID

## 2024-02-07 VITALS
DIASTOLIC BLOOD PRESSURE: 89 MMHG | HEART RATE: 87 BPM | BODY MASS INDEX: 30.12 KG/M2 | OXYGEN SATURATION: 100 % | WEIGHT: 181 LBS | SYSTOLIC BLOOD PRESSURE: 138 MMHG

## 2024-02-07 DIAGNOSIS — N18.31 STAGE 3A CHRONIC KIDNEY DISEASE (HCC): Primary | ICD-10-CM

## 2024-02-07 DIAGNOSIS — I10 ESSENTIAL HYPERTENSION: ICD-10-CM

## 2024-02-07 DIAGNOSIS — L72.0 EPIDERMOID CYST OF SKIN OF BACK: ICD-10-CM

## 2024-02-07 PROBLEM — D17.71 BENIGN LIPOMATOUS NEOPLASM OF KIDNEY: Status: ACTIVE | Noted: 2024-02-07

## 2024-02-07 PROCEDURE — 99213 OFFICE O/P EST LOW 20 MIN: CPT | Performed by: NURSE PRACTITIONER

## 2024-02-07 PROCEDURE — 3075F SYST BP GE 130 - 139MM HG: CPT | Performed by: NURSE PRACTITIONER

## 2024-02-07 PROCEDURE — 3079F DIAST BP 80-89 MM HG: CPT | Performed by: NURSE PRACTITIONER

## 2024-02-07 RX ORDER — CINACALCET 30 MG/1
30 TABLET, FILM COATED ORAL DAILY
COMMUNITY

## 2024-02-07 RX ORDER — LANOLIN ALCOHOL/MO/W.PET/CERES
400 CREAM (GRAM) TOPICAL DAILY
COMMUNITY

## 2024-02-07 NOTE — PROGRESS NOTES
[facility]  [facility] PRIMARY CARE  [address] MAIN FLOOR  [facility] [address]  Dept: [phone]  Dept Fax: [phone]    Bushra Jarrell (:  1965) is a 58 y.o. female,Established patient, here for evaluation of the following chief complaint(s):  Follow-up (Patient is here today for a follow up )    Bushra Jarrell is a 58-year-old female here today for follow-up after visit with nephrology and diagnosis of stage IIIa kidney disease.  History of ischemic stroke and hypertension.  Also sees urology at [facility] due to history of nephrolithiasis.  Office note states there were lesions noted on the MRI of the kidney and would fax the interpretation to the urologist, Dr. Deutsch.       ASSESSMENT/PLAN:  1. Stage 3a chronic kidney disease (HCC)  2. Essential hypertension  3. Epidermoid cyst of skin of back  -     Kieran Zhou PA, Dermatology, [place]    Noninflamed cyst to the left posterior shoulder blade, appears to be a blackhead that is not inflamed at this time.  Did agree to refer to dermatology as she is having recurrent issues and wishes to have it removed or evaluated  Blood pressure is at goal today, reviewed return in 3 months for a Pap  Reviewed guidelines from nephrology including avoiding NSAIDs.  She does plan to keep her follow-up with urology regarding MRI findings on the kidney    Return in about 3 months (around 2024) for PAP.         Subjective   SUBJECTIVE/OBJECTIVE:  See's urlology on the  to review MRI and cyst findings    Asking to to look an an area to left shoulder blade, has been there for \"years\"  Throbbing sensation, does not drain, Sometimes gets red. Slowly grown.  Does not itch      Review of Systems       Objective     DIAGNOSTIC FINDINGS:  CBC:  Lab Results   Component Value Date/Time    WBC 4.4 2020 06:12 PM    HGB 13.1 2020 06:12 PM    PLT See Reflexed IPF Result 2020 06:12 PM

## 2024-02-19 ENCOUNTER — TELEPHONE (OUTPATIENT)
Dept: ORTHOPEDIC SURGERY | Age: 59
End: 2024-02-19

## 2024-02-19 NOTE — TELEPHONE ENCOUNTER
Resident clinic pt, routing to last treating an on call providers. Is referral order appropriate? Please advise.

## 2024-02-19 NOTE — TELEPHONE ENCOUNTER
Patient wants to get a referral for physical therapy for MARILEE knees.  Please let her know if this can be done.  Call her at [phone].  She has been seen at the [place] ortho office.  Thanks.

## 2024-03-06 ENCOUNTER — OFFICE VISIT (OUTPATIENT)
Dept: ORTHOPEDIC SURGERY | Age: 59
End: 2024-03-06

## 2024-03-06 VITALS — HEIGHT: 65 IN | WEIGHT: 176 LBS | BODY MASS INDEX: 29.32 KG/M2

## 2024-03-06 DIAGNOSIS — M22.2X2 PATELLOFEMORAL SYNDROME OF LEFT KNEE: ICD-10-CM

## 2024-03-06 DIAGNOSIS — M22.2X1 PATELLOFEMORAL SYNDROME OF RIGHT KNEE: Primary | ICD-10-CM

## 2024-03-06 RX ORDER — BUPIVACAINE HYDROCHLORIDE 2.5 MG/ML
2 INJECTION, SOLUTION INFILTRATION; PERINEURAL ONCE
Status: COMPLETED | OUTPATIENT
Start: 2024-03-06 | End: 2024-03-06

## 2024-03-06 RX ORDER — METHYLPREDNISOLONE ACETATE 80 MG/ML
80 INJECTION, SUSPENSION INTRA-ARTICULAR; INTRALESIONAL; INTRAMUSCULAR; SOFT TISSUE ONCE
Status: COMPLETED | OUTPATIENT
Start: 2024-03-06 | End: 2024-03-06

## 2024-03-06 RX ADMIN — BUPIVACAINE HYDROCHLORIDE 5 MG: 2.5 INJECTION, SOLUTION INFILTRATION; PERINEURAL at 11:39

## 2024-03-06 RX ADMIN — METHYLPREDNISOLONE ACETATE 80 MG: 80 INJECTION, SUSPENSION INTRA-ARTICULAR; INTRALESIONAL; INTRAMUSCULAR; SOFT TISSUE at 11:39

## 2024-03-07 NOTE — PROGRESS NOTES
[facility] ORTHO SPECIALISTS  [address]  [place] [place] [facility] [address]  Dept: [phone]  Dept Fax: [phone]        Ambulatory Follow Up    Subjective:       HPI:    Bushra Jarrell is a 58 y.o. year old female who presents to our office today for routine follow-up regarding right knee pain. Pt has knee pain bilaterally, however today the right one is the focus of her visit and she states her left is doing well. Pt was previously seen in our clinic on 1/24/24, and was doing well with PT and with receiving a corticosteroid injection at her visit in 12/8/23. Pt would like to have an injection in the right knee today. Pt states that she has stopped physical therapy due to catching covid, abut would like to start going to PT. Pt states that she has been using tylenol and heat for pain relief, which provide minimal pain relief. Pt states most of her pain occurs with deep squatting and is located about the anterior knee. Pt also notes pain with walking. Denies any new injuries/trauma, denies any new numbness/tingling. Routine follow up images of the left knee were performed, though today we are focusing on the right knee.     Review of Systems:  Constitutional: Negative for fever and chills.   HENT: Negative for congestion.    Eyes: Negative for blurred vision and double vision.   Respiratory: Negative for cough, shortness of breath and wheezing.    Cardiovascular: Negative for chest pain and palpitations.   Gastrointestinal: Negative for nausea. Negative for vomiting.   Musculoskeletal: Positive for (right knee pain).   Skin: Negative for itching and rash.   Neurological: Negative for dizziness, sensory change and headaches.   Psychiatric/Behavioral: Negative for depression and suicidal ideas.       Objective :   General: AAOx3, NAD, appears stated age  CV: no obvious JVD, distal pulses 2+  Respiratory: chest rise symmetric, unlabored respirations, no audible

## 2024-03-09 DIAGNOSIS — Z76.0 MEDICATION REFILL: ICD-10-CM

## 2024-03-09 DIAGNOSIS — I10 ESSENTIAL HYPERTENSION: ICD-10-CM

## 2024-03-11 RX ORDER — AMLODIPINE BESYLATE 10 MG/1
10 TABLET ORAL DAILY
Qty: 90 TABLET | Refills: 0 | Status: SHIPPED | OUTPATIENT
Start: 2024-03-11

## 2024-03-13 ENCOUNTER — HOSPITAL ENCOUNTER (OUTPATIENT)
Dept: PHYSICAL THERAPY | Age: 59
Setting detail: THERAPIES SERIES
Discharge: HOME | End: 2024-03-13

## 2024-03-13 NOTE — CONSULTS
[x] [facility]  Outpatient Rehabilitation &  Therapy  [address]  [phone]  [phone] [] [facility]  Outpatient Rehabilitation &  Therapy  [address]  P: [phone]  F: [phone] [] [facility]  Outpatient Rehabilitation &  Therapy  [address]  P: [phone]  F: [phone] [] [facility]  Outpatient Rehabilitation &  Therapy  [address]  [phone]  [phone] [] [facility]  Outpatient Rehabilitation &  Therapy  [address]   P: [phone]  F: [phone]  [] [facility]  Outpatient Rehabilitation &  Therapy  [address]  P: [phone]  F: [phone] [] [facility]  Outpatient Rehabilitation &  Therapy  [address]  [address]  P: [phone]  F: [phone] [] [facility]  Outpatient Rehabilitation &  Therapy  [address]  P: [phone]  F: [phone] [] [facility]  Outpatient Rehabilitation &  Therapy  [address]  P: [phone]  F: [phone]  [] [facility] Outpatient Rehabilitation &  Therapy  [address]  P: [phone]  F: [phone]     Therapy Cancel/No Show note    Date: 3/13/2024  Patient: Bushra Jarrell  : 1965  MRN: [id number]    Cancels/No Shows to date: 1 cx / 0 ns    For today's appointment patient:    [x]  Cancelled    [x] Rescheduled appointment    [] No-show     Reason given by patient:    []  Patient ill    []  Conflicting appointment    [] No transportation      [] Conflict with work    [] No reason given    [] Weather related    [] COVID-19    [x] Other:      Comments: Needed to  granddaughter.       [x] Next appointment was confirmed    Electronically signed by: Jose Roche, PT

## 2024-03-20 ENCOUNTER — HOSPITAL ENCOUNTER (OUTPATIENT)
Dept: PHYSICAL THERAPY | Age: 59
Setting detail: THERAPIES SERIES
Discharge: HOME | End: 2024-03-20

## 2024-03-20 NOTE — FLOWSHEET NOTE
[x] [facility]  Outpatient Rehabilitation &  Therapy  [address]  [phone]  [phone] [] [facility]  Outpatient Rehabilitation &  Therapy  [address]  P: [phone]  F: [phone] [] [facility]  Outpatient Rehabilitation &  Therapy  [address]  P: [phone]  F: [phone] [] [facility]  Outpatient Rehabilitation &  Therapy  [address]  [phone]  [phone] [] [facility]  Outpatient Rehabilitation &  Therapy  [address]   P: [phone]  F: [phone]  [] [facility]  Outpatient Rehabilitation &  Therapy  [address]  P: [phone]  F: [phone] [] [facility]  Outpatient Rehabilitation &  Therapy  [address]  P: [phone]  F: [phone] [] [facility]  Outpatient Rehabilitation &  Therapy  [address]  P: [phone]  F: [phone] [] [facility]  Outpatient Rehabilitation &  Therapy  [address]  P: [phone]  F: [phone]  [] [facility] Outpatient Rehabilitation &  Therapy  [address]  P: [phone]  F: [phone]     Therapy Cancel/No Show note    Date: 3/20/2024  Patient: Bushra Jarrell  : 1965  MRN: [id number]    Cancels/No Shows to date: 2 cx / 0 ns    For today's appointment patient:    [x]  Cancelled    [x] Rescheduled appointment    [] No-show     Reason given by patient:    []  Patient ill    []  Conflicting appointment    [] No transportation      [] Conflict with work    [] No reason given    [] Weather related    [] COVID-19    [x] Other:      Comments: Overslept      [x] Next appointment was confirmed    Electronically signed by: Jose Roche, PT

## 2024-03-25 DIAGNOSIS — M25.462 SWELLING OF JOINT, KNEE, LEFT: ICD-10-CM

## 2024-03-26 RX ORDER — PSEUDOEPHED/ACETAMINOPH/DIPHEN 30MG-500MG
TABLET ORAL
Qty: 120 TABLET | Refills: 0 | Status: SHIPPED | OUTPATIENT
Start: 2024-03-26

## 2024-04-03 ENCOUNTER — HOSPITAL ENCOUNTER (OUTPATIENT)
Age: 59
Setting detail: OUTPATIENT SURGERY
Discharge: HOME | End: 2024-04-03
Attending: UROLOGY | Admitting: UROLOGY
Payer: MEDICAID

## 2024-04-03 ENCOUNTER — APPOINTMENT (OUTPATIENT)
Dept: GENERAL RADIOLOGY | Age: 59
End: 2024-04-03
Attending: UROLOGY
Payer: MEDICAID

## 2024-04-03 ENCOUNTER — ANESTHESIA (OUTPATIENT)
Dept: OPERATING ROOM | Age: 59
End: 2024-04-03
Payer: MEDICAID

## 2024-04-03 ENCOUNTER — ANESTHESIA EVENT (OUTPATIENT)
Dept: OPERATING ROOM | Age: 59
End: 2024-04-03
Payer: MEDICAID

## 2024-04-03 VITALS
DIASTOLIC BLOOD PRESSURE: 96 MMHG | TEMPERATURE: 97.2 F | SYSTOLIC BLOOD PRESSURE: 158 MMHG | WEIGHT: 175 LBS | BODY MASS INDEX: 29.12 KG/M2 | RESPIRATION RATE: 19 BRPM | OXYGEN SATURATION: 97 % | HEART RATE: 70 BPM

## 2024-04-03 PROCEDURE — 6360000002 HC RX W HCPCS: Performed by: NURSE ANESTHETIST, CERTIFIED REGISTERED

## 2024-04-03 PROCEDURE — 3600000002 HC SURGERY LEVEL 2 BASE: Performed by: UROLOGY

## 2024-04-03 PROCEDURE — 2580000003 HC RX 258: Performed by: ANESTHESIOLOGY

## 2024-04-03 PROCEDURE — 7100000011 HC PHASE II RECOVERY - ADDTL 15 MIN: Performed by: UROLOGY

## 2024-04-03 PROCEDURE — 7100000010 HC PHASE II RECOVERY - FIRST 15 MIN: Performed by: UROLOGY

## 2024-04-03 PROCEDURE — 3600000012 HC SURGERY LEVEL 2 ADDTL 15MIN: Performed by: UROLOGY

## 2024-04-03 PROCEDURE — 3700000000 HC ANESTHESIA ATTENDED CARE: Performed by: UROLOGY

## 2024-04-03 PROCEDURE — 7100000000 HC PACU RECOVERY - FIRST 15 MIN: Performed by: UROLOGY

## 2024-04-03 PROCEDURE — 2500000003 HC RX 250 WO HCPCS: Performed by: NURSE ANESTHETIST, CERTIFIED REGISTERED

## 2024-04-03 PROCEDURE — 3700000001 HC ADD 15 MINUTES (ANESTHESIA): Performed by: UROLOGY

## 2024-04-03 PROCEDURE — 6370000000 HC RX 637 (ALT 250 FOR IP): Performed by: ANESTHESIOLOGY

## 2024-04-03 PROCEDURE — 74018 RADEX ABDOMEN 1 VIEW: CPT

## 2024-04-03 PROCEDURE — 7100000001 HC PACU RECOVERY - ADDTL 15 MIN: Performed by: UROLOGY

## 2024-04-03 RX ORDER — EPHEDRINE SULFATE/0.9% NACL/PF 25 MG/5 ML
SYRINGE (ML) INTRAVENOUS PRN
Status: DISCONTINUED | OUTPATIENT
Start: 2024-04-03 | End: 2024-04-03 | Stop reason: SDUPTHER

## 2024-04-03 RX ORDER — OXYCODONE HYDROCHLORIDE 5 MG/1
5 TABLET ORAL
Status: DISCONTINUED | OUTPATIENT
Start: 2024-04-03 | End: 2024-04-03 | Stop reason: HOSPADM

## 2024-04-03 RX ORDER — SCOLOPAMINE TRANSDERMAL SYSTEM 1 MG/1
1 PATCH, EXTENDED RELEASE TRANSDERMAL ONCE
Status: DISCONTINUED | OUTPATIENT
Start: 2024-04-03 | End: 2024-04-03 | Stop reason: HOSPADM

## 2024-04-03 RX ORDER — SODIUM CHLORIDE 0.9 % (FLUSH) 0.9 %
5-40 SYRINGE (ML) INJECTION PRN
Status: DISCONTINUED | OUTPATIENT
Start: 2024-04-03 | End: 2024-04-03 | Stop reason: HOSPADM

## 2024-04-03 RX ORDER — SODIUM CHLORIDE 0.9 % (FLUSH) 0.9 %
5-40 SYRINGE (ML) INJECTION EVERY 12 HOURS SCHEDULED
Status: DISCONTINUED | OUTPATIENT
Start: 2024-04-03 | End: 2024-04-03 | Stop reason: HOSPADM

## 2024-04-03 RX ORDER — PROMETHAZINE HYDROCHLORIDE 12.5 MG/1
12.5 TABLET ORAL ONCE
Status: COMPLETED | OUTPATIENT
Start: 2024-04-03 | End: 2024-04-03

## 2024-04-03 RX ORDER — ONDANSETRON 2 MG/ML
4 INJECTION INTRAMUSCULAR; INTRAVENOUS
Status: DISCONTINUED | OUTPATIENT
Start: 2024-04-03 | End: 2024-04-03 | Stop reason: HOSPADM

## 2024-04-03 RX ORDER — SODIUM CHLORIDE, SODIUM LACTATE, POTASSIUM CHLORIDE, CALCIUM CHLORIDE 600; 310; 30; 20 MG/100ML; MG/100ML; MG/100ML; MG/100ML
INJECTION, SOLUTION INTRAVENOUS CONTINUOUS
Status: DISCONTINUED | OUTPATIENT
Start: 2024-04-03 | End: 2024-04-03 | Stop reason: HOSPADM

## 2024-04-03 RX ORDER — FENTANYL CITRATE 50 UG/ML
25 INJECTION, SOLUTION INTRAMUSCULAR; INTRAVENOUS EVERY 5 MIN PRN
Status: DISCONTINUED | OUTPATIENT
Start: 2024-04-03 | End: 2024-04-03 | Stop reason: HOSPADM

## 2024-04-03 RX ORDER — SODIUM CHLORIDE 9 MG/ML
INJECTION, SOLUTION INTRAVENOUS CONTINUOUS
Status: DISCONTINUED | OUTPATIENT
Start: 2024-04-03 | End: 2024-04-03 | Stop reason: HOSPADM

## 2024-04-03 RX ORDER — SODIUM CHLORIDE 9 MG/ML
INJECTION, SOLUTION INTRAVENOUS PRN
Status: DISCONTINUED | OUTPATIENT
Start: 2024-04-03 | End: 2024-04-03 | Stop reason: HOSPADM

## 2024-04-03 RX ORDER — PROPOFOL 10 MG/ML
INJECTION, EMULSION INTRAVENOUS PRN
Status: DISCONTINUED | OUTPATIENT
Start: 2024-04-03 | End: 2024-04-03 | Stop reason: SDUPTHER

## 2024-04-03 RX ORDER — LIDOCAINE HYDROCHLORIDE 10 MG/ML
1 INJECTION, SOLUTION EPIDURAL; INFILTRATION; INTRACAUDAL; PERINEURAL
Status: DISCONTINUED | OUTPATIENT
Start: 2024-04-04 | End: 2024-04-03 | Stop reason: HOSPADM

## 2024-04-03 RX ORDER — LIDOCAINE HYDROCHLORIDE 20 MG/ML
INJECTION, SOLUTION EPIDURAL; INFILTRATION; INTRACAUDAL; PERINEURAL PRN
Status: DISCONTINUED | OUTPATIENT
Start: 2024-04-03 | End: 2024-04-03 | Stop reason: SDUPTHER

## 2024-04-03 RX ORDER — FENTANYL CITRATE 50 UG/ML
INJECTION, SOLUTION INTRAMUSCULAR; INTRAVENOUS PRN
Status: DISCONTINUED | OUTPATIENT
Start: 2024-04-03 | End: 2024-04-03 | Stop reason: SDUPTHER

## 2024-04-03 RX ORDER — NALOXONE HYDROCHLORIDE 0.4 MG/ML
INJECTION, SOLUTION INTRAMUSCULAR; INTRAVENOUS; SUBCUTANEOUS PRN
Status: DISCONTINUED | OUTPATIENT
Start: 2024-04-03 | End: 2024-04-03 | Stop reason: HOSPADM

## 2024-04-03 RX ADMIN — SODIUM CHLORIDE, POTASSIUM CHLORIDE, SODIUM LACTATE AND CALCIUM CHLORIDE: 600; 310; 30; 20 INJECTION, SOLUTION INTRAVENOUS at 18:05

## 2024-04-03 RX ADMIN — FENTANYL CITRATE 100 MCG: 50 INJECTION INTRAMUSCULAR; INTRAVENOUS at 16:40

## 2024-04-03 RX ADMIN — LIDOCAINE HYDROCHLORIDE 60 MG: 20 INJECTION, SOLUTION EPIDURAL; INFILTRATION; INTRACAUDAL; PERINEURAL at 16:40

## 2024-04-03 RX ADMIN — SODIUM CHLORIDE, POTASSIUM CHLORIDE, SODIUM LACTATE AND CALCIUM CHLORIDE: 600; 310; 30; 20 INJECTION, SOLUTION INTRAVENOUS at 13:55

## 2024-04-03 RX ADMIN — PROPOFOL 200 MG: 10 INJECTION, EMULSION INTRAVENOUS at 16:40

## 2024-04-03 RX ADMIN — EPHEDRINE SULFATE 25 MG: 5 INJECTION INTRAVENOUS at 16:54

## 2024-04-03 RX ADMIN — PROMETHAZINE HYDROCHLORIDE 12.5 MG: 12.5 TABLET ORAL at 14:01

## 2024-04-03 ASSESSMENT — ENCOUNTER SYMPTOMS: SHORTNESS OF BREATH: 0

## 2024-04-03 ASSESSMENT — PAIN - FUNCTIONAL ASSESSMENT
PAIN_FUNCTIONAL_ASSESSMENT: NONE - DENIES PAIN
PAIN_FUNCTIONAL_ASSESSMENT: 0-10

## 2024-04-03 NOTE — ANESTHESIA POSTPROCEDURE EVALUATION
Department of Anesthesiology  Postprocedure Note    Patient: Bushra Jarrell  MRN: [id number]  YOB: 1965  Date of evaluation: 4/3/2024    Procedure Summary       Date: 04/03/24 Room / Location: [address]    Anesthesia Start: 1636 Anesthesia Stop: 1811    Procedure: EXTRACORPOREAL SHOCK WAVE LITHOTRIPSY (Bilateral: Ureter) Diagnosis:       Renal calculus, bilateral      (Renal calculus, bilateral [N20.0])    Surgeons: Can Deutsch MD Responsible Provider: Shwetha Haskins MD    Anesthesia Type: general ASA Status: 3            Anesthesia Type: No value filed.    Black Phase I: Black Score: 10    Black Phase II:      Anesthesia Post Evaluation    Cardiovascular status: hemodynamically stable    No notable events documented.

## 2024-04-03 NOTE — ANESTHESIA PRE PROCEDURE
08/03/2015        ABGs: No results found for: \"PHART\", \"PO2ART\", \"GKW1RZE\", \"JFS9PCI\", \"BEART\", \"S1OVRMPI\"     Type & Screen (If Applicable):  No results found for: \"LABABO\", \"LABRH\"    Drug/Infectious Status (If Applicable):  No results found for: \"HIV\", \"HEPCAB\"    COVID-19 Screening (If Applicable):   Lab Results   Component Value Date/Time    COVID19 Not Detected 04/20/2020 08:17 PM           Anesthesia Evaluation    Airway: Mallampati: I  TM distance: >3 FB   Neck ROM: full  Mouth opening: > = 3 FB   Dental:          Pulmonary:   (+)           asthma:     (-) shortness of breath                           Cardiovascular:    (+) hypertension:    (-)  angina                Neuro/Psych:               GI/Hepatic/Renal:             Endo/Other:                     Abdominal:             Vascular:          Other Findings:       Anesthesia Plan      general     ASA 3                               Shwetha Haskins MD   4/3/2024

## 2024-04-03 NOTE — OP NOTE
Operative Note      Patient: Bushra Jarrell  YOB: 1965  MRN: [id number]    Date of Procedure: 4/3/2024    Pre-Op Diagnosis Codes:     * Renal calculus, bilateral [N20.0]    Post-Op Diagnosis: Same       Procedure(s):  EXTRACORPOREAL SHOCK WAVE LITHOTRIPSY bilateral    Surgeon(s):  Can Pimentel MD    Assistant:   * No surgical staff found *    Anesthesia: General    Estimated Blood Loss (mL): Minimal    Complications: None    Specimens:   * No specimens in log *    Implants:  * No implants in log *      Drains: * No LDAs found *    Findings:  Infection Present At Time Of Surgery (PATOS) (choose all levels that have infection present):  No infection present  Other Findings:     Detailed Description of Procedure:   She was given general anesthesia and put in supine position.  Bilateral ESWL was performed on each kidney.  Each kidney received 3000 shocks at a power level of 7.  The stones in each kidney fragmented well.  She tolerated it well.  She will follow-up in 1 month with a post procedure KUB.    Electronically signed by Can PIMENTEL MD on 4/3/2024 at 6:05 PM

## 2024-04-03 NOTE — H&P
History and Physical Service   [facility]    HISTORY AND PHYSICAL EXAMINATION            Date of Evaluation: 4/3/2024  Patient name:  Bushra Jarrell  MRN:   [id number]  YOB: 1965  PCP:    Мария Dominguez APRN - CNP    History Obtained From:     Patient, medical records    History of Present Illness:     This is Bushra Jarrell a 58 y.o. female who presents today for a EXTRACORPOREAL SHOCK WAVE LITHOTRIPSY by Can Deutsch MD for Renal calculus, bilateral. Patient with known kidney stones follow with Urologist, Dr Deutsch for her care. Previous bilateral ureteroscopy done 4/14/22. Multiple lithotripsy done in past. She follows with Nephrologist, Dr Gusman for her stage II chronic kidney disease. Per Dr Deutsch noted of 2/12/24 the patient had an MRI with and w/o contrast that showed \"a 2 cm heterogeneous lesion, lower pole right kidney with questionable enhancement, however, no corresponding hyperintensity on diffusion images noted.\" Patient is having some intermittent discomfort  Dr Deutsch recommended ESWL and the patient arrived for her procedure  Denies fever, chills, dysuria, hematuria, frequency, shortness of breath, cough, congestion, wheezing, chest pain, open sores or wounds.    Past Medical History:     Past Medical History:   Diagnosis Date    CKD (chronic kidney disease), stage III (HCC)     History of blood transfusion 1984    during child birth    Hyperlipidemia     Hypertension     managed by PCP and Nephrologist Dr landin [facility]    Hyperthyroidism     Kidney stone     PONV (postoperative nausea and vomiting)     TIA (transient ischemic attack) 2018    Does not have a neurologist        Past Surgical History:     Past Surgical History:   Procedure Laterality Date    CHOLECYSTECTOMY      COLONOSCOPY      x 2 only had polyps once.    LITHOTRIPSY      many times    ROTATOR CUFF REPAIR Left 12/2022    TUBAL LIGATION          Medications Prior to

## 2024-04-12 DIAGNOSIS — E78.5 DYSLIPIDEMIA: ICD-10-CM

## 2024-04-12 RX ORDER — ATORVASTATIN CALCIUM 40 MG/1
40 TABLET, FILM COATED ORAL NIGHTLY
Qty: 30 TABLET | Refills: 5 | Status: SHIPPED | OUTPATIENT
Start: 2024-04-12

## 2024-05-28 DIAGNOSIS — M25.561 PAIN IN BOTH KNEES, UNSPECIFIED CHRONICITY: Primary | ICD-10-CM

## 2024-05-28 DIAGNOSIS — M25.562 PAIN IN BOTH KNEES, UNSPECIFIED CHRONICITY: Primary | ICD-10-CM

## 2024-05-29 ENCOUNTER — OFFICE VISIT (OUTPATIENT)
Dept: ORTHOPEDIC SURGERY | Age: 59
End: 2024-05-29
Payer: MEDICAID

## 2024-05-29 VITALS — HEIGHT: 65 IN | WEIGHT: 184 LBS | RESPIRATION RATE: 18 BRPM | BODY MASS INDEX: 30.66 KG/M2

## 2024-05-29 DIAGNOSIS — G89.29 BILATERAL CHRONIC KNEE PAIN: Primary | ICD-10-CM

## 2024-05-29 DIAGNOSIS — M22.2X1 PATELLOFEMORAL SYNDROME OF RIGHT KNEE: ICD-10-CM

## 2024-05-29 DIAGNOSIS — M17.0 BILATERAL PRIMARY OSTEOARTHRITIS OF KNEE: ICD-10-CM

## 2024-05-29 DIAGNOSIS — M25.561 BILATERAL CHRONIC KNEE PAIN: Primary | ICD-10-CM

## 2024-05-29 DIAGNOSIS — M22.2X2 PATELLOFEMORAL SYNDROME OF LEFT KNEE: ICD-10-CM

## 2024-05-29 DIAGNOSIS — M25.562 BILATERAL CHRONIC KNEE PAIN: Primary | ICD-10-CM

## 2024-05-29 PROCEDURE — 99214 OFFICE O/P EST MOD 30 MIN: CPT | Performed by: PHYSICIAN ASSISTANT

## 2024-05-29 NOTE — PROGRESS NOTES
[facility] ORTHOPEDICS AND SPORTS MEDICINE  [address]  [facility] [address]  Dept: [phone]  Dept Fax: [phone]        Ambulatory Follow Up      Subjective:   Bushra Jarrell is a 58 y.o. year old female who presents to our office today for routine followup regarding her   1. Bilateral chronic knee pain    2. Bilateral primary osteoarthritis of knee    3. Patellofemoral syndrome of left knee    4. Patellofemoral syndrome of right knee        Chief Complaint   Patient presents with    Knee Pain     B Knee pain       HPI Bushra Jarrell  is a 58 y.o. female who presents today in follow for 1 year of bilateral knee pain.  The patient was last seen on 3/6/2024 with Dr Her and underwent treatment in the form of referral to formal PT and bilateral knee corticosteroid injections.  The patient notes 1-2 weeks of improvement with the injections. The patient did complete formal PT and noted very minimal improvement in her knee discomfort with PT and the injections. She has not had prior Visco supplemental injections or surgery to her knees.    Patient cannot take NSAIDs due to stage 3 CKD. She notes that she tried to take tylenol, but it doesn't help.      Review of Systems   Constitutional:  Negative for activity change and fever.   HENT:  Negative for sneezing.    Respiratory:  Negative for cough and shortness of breath.    Cardiovascular:  Negative for chest pain.   Gastrointestinal:  Negative for vomiting.   Musculoskeletal:  Positive for arthralgias (Bilateral knee). Negative for joint swelling and myalgias.   Skin:  Negative for color change.   Neurological:  Negative for weakness and numbness.   Psychiatric/Behavioral:  Negative for sleep disturbance.        Objective :   Resp 18   Ht 1.651 m (5' 5\")   Wt 83.5 kg (184 lb)   BMI 30.62 kg/m²  Body mass index is 30.62 kg/m².  General: Bushra Jarrell is a 58 y.o. female who

## 2024-05-30 ASSESSMENT — ENCOUNTER SYMPTOMS
VOMITING: 0
COUGH: 0
COLOR CHANGE: 0
SHORTNESS OF BREATH: 0

## 2024-06-11 DIAGNOSIS — I10 ESSENTIAL HYPERTENSION: ICD-10-CM

## 2024-06-11 DIAGNOSIS — Z76.0 MEDICATION REFILL: ICD-10-CM

## 2024-06-11 RX ORDER — AMLODIPINE BESYLATE 10 MG/1
10 TABLET ORAL DAILY
Qty: 90 TABLET | Refills: 0 | Status: SHIPPED | OUTPATIENT
Start: 2024-06-11

## 2024-06-14 ENCOUNTER — TELEPHONE (OUTPATIENT)
Dept: PRIMARY CARE CLINIC | Age: 59
End: 2024-06-14

## 2024-06-14 NOTE — TELEPHONE ENCOUNTER
Patient is requesting to have something called in for nausea . She had kidneys stones and had surgery and was told they were not all removed so she believe this may be the issues. Denies any fevers or chills. Just the nausea

## 2024-06-24 ENCOUNTER — HOSPITAL ENCOUNTER (OUTPATIENT)
Dept: MRI IMAGING | Age: 59
Discharge: HOME | End: 2024-06-26
Payer: MEDICAID

## 2024-06-24 DIAGNOSIS — M25.561 BILATERAL CHRONIC KNEE PAIN: ICD-10-CM

## 2024-06-24 DIAGNOSIS — M17.0 BILATERAL PRIMARY OSTEOARTHRITIS OF KNEE: ICD-10-CM

## 2024-06-24 DIAGNOSIS — M22.2X2 PATELLOFEMORAL SYNDROME OF LEFT KNEE: ICD-10-CM

## 2024-06-24 DIAGNOSIS — G89.29 BILATERAL CHRONIC KNEE PAIN: ICD-10-CM

## 2024-06-24 DIAGNOSIS — M25.562 BILATERAL CHRONIC KNEE PAIN: ICD-10-CM

## 2024-06-24 DIAGNOSIS — M22.2X1 PATELLOFEMORAL SYNDROME OF RIGHT KNEE: ICD-10-CM

## 2024-06-24 PROCEDURE — 73721 MRI JNT OF LWR EXTRE W/O DYE: CPT

## 2024-06-25 ENCOUNTER — TELEPHONE (OUTPATIENT)
Dept: ORTHOPEDIC SURGERY | Age: 59
End: 2024-06-25

## 2024-06-25 NOTE — TELEPHONE ENCOUNTER
I called the patient in regards to her left knee MRI results.  Patient did have a right knee MRI as well which has not resulted yet.  Patient does have mostly degenerative changes within the knee.  She may benefit from a knee arthroscopy but I recommended that she follow-up with Dr. Her to discuss any remaining treatment options.  The patient has had formal physical therapy and corticosteroid injections and did not notice significant improvement in her knee discomfort.    Patient was scheduled on Monday, 7/15/2024 with Dr. Her at 8 AM.  The patient is amenable to the appointment.  She was instructed to call our office with any questions or concerns prior to that time.

## 2024-07-17 ENCOUNTER — OFFICE VISIT (OUTPATIENT)
Dept: PRIMARY CARE CLINIC | Age: 59
End: 2024-07-17
Payer: MEDICAID

## 2024-07-17 VITALS
DIASTOLIC BLOOD PRESSURE: 89 MMHG | OXYGEN SATURATION: 100 % | SYSTOLIC BLOOD PRESSURE: 138 MMHG | BODY MASS INDEX: 30.79 KG/M2 | HEART RATE: 63 BPM | WEIGHT: 185 LBS

## 2024-07-17 DIAGNOSIS — Z02.1 ENCOUNTER FOR PRE-EMPLOYMENT EXAMINATION: ICD-10-CM

## 2024-07-17 DIAGNOSIS — I10 ESSENTIAL HYPERTENSION: ICD-10-CM

## 2024-07-17 DIAGNOSIS — E78.5 DYSLIPIDEMIA: ICD-10-CM

## 2024-07-17 DIAGNOSIS — L30.9 FACIAL DERMATITIS: Primary | ICD-10-CM

## 2024-07-17 DIAGNOSIS — N18.31 STAGE 3A CHRONIC KIDNEY DISEASE (HCC): ICD-10-CM

## 2024-07-17 PROCEDURE — 3079F DIAST BP 80-89 MM HG: CPT | Performed by: NURSE PRACTITIONER

## 2024-07-17 PROCEDURE — 3075F SYST BP GE 130 - 139MM HG: CPT | Performed by: NURSE PRACTITIONER

## 2024-07-17 PROCEDURE — 99214 OFFICE O/P EST MOD 30 MIN: CPT | Performed by: NURSE PRACTITIONER

## 2024-07-17 RX ORDER — DIAPER,BRIEF,INFANT-TODD,DISP
EACH MISCELLANEOUS
Qty: 30 G | Refills: 0 | Status: SHIPPED | OUTPATIENT
Start: 2024-07-17

## 2024-07-17 RX ORDER — LISINOPRIL 10 MG/1
10 TABLET ORAL DAILY
COMMUNITY
Start: 2024-06-25

## 2024-07-17 NOTE — PROGRESS NOTES
[facility]  [facility] PRIMARY CARE  [facility] [facility] [facility] MAIN FLOOR  [facility] [address]  Dept: [phone]  Dept Fax: [phone]    Bushra Jarrell (:  1965) is a 59 y.o. female,Established patient, here for evaluation of the following chief complaint(s):  Follow-up (3 month follow up )    Bushra Jarrell is a 59-year-old female here today for routine follow-up of chronic conditions, including stage IIIa CKD, hypertension.  Last seen on 2024.  Established with orthopedics in May for bilateral chronic knee pain.  Also following with [facility] nephrology, last seen 2024.  Lisinopril added for renal protection.      Assessment & Plan   ASSESSMENT/PLAN:  1. Facial dermatitis  -     hydrocortisone 1 % ointment; Apply topically 2 times daily., Disp-30 g, R-0, Normal  2. Encounter for pre-employment examination  -     tuberculin injection 5 Units; 5 Units, IntraDERmal, ONCE, 1 dose, On 24 at 1700  3. Essential hypertension  4. Dyslipidemia  5. Stage 3a chronic kidney disease (HCC)    Blood pressure at goal with lisinopril, no reported ADRs  Agreed to low-dose steroid cream to use along the face and behind the ears for up to 2 weeks due to possible facial dermatitis from mask wearing.  Return in 48 to 72 hours for PPD read    Return in about 6 months (around 2025) for PAP.         Subjective   SUBJECTIVE/OBJECTIVE:  Doing well with lisinopril. No current facial swelling, but having facial itching on both sides of her jaw over the last 1-2 weeks.  Feels skin is dry, no new skin products.  Works as an [employer] and does often have to wear a mask at work, which she believes is possibly causing the facial irritation     Meets with orthopedic surgeon next week to discuss MRI findings as there was a partial tear in the knee        Review of Systems       Objective     DIAGNOSTIC FINDINGS:  CBC:  Lab Results   Component Value Date/Time    WBC 4.4 2020

## 2024-07-19 ENCOUNTER — LAB (OUTPATIENT)
Dept: PRIMARY CARE CLINIC | Age: 59
End: 2024-07-19

## 2024-07-19 NOTE — PROGRESS NOTES
Patient came in today for her left arm TB read. Negative. Advised to come back within  1-2 weeks for 2nd tb . To be done in right arm. Paper work in writes bin

## 2024-07-24 ENCOUNTER — LAB (OUTPATIENT)
Dept: PRIMARY CARE CLINIC | Age: 59
End: 2024-07-24

## 2024-07-24 DIAGNOSIS — Z11.1 PPD SCREENING TEST: Primary | ICD-10-CM

## 2024-07-24 NOTE — PROGRESS NOTES
Patient came in today for her 2nd TB . Done in right forearm . Advised pt to come back in 2 days to have it read . Scheduled for 7/26/24. Papers are in writers box. Needs scanned into cart and given to patient after completion

## 2024-07-26 ENCOUNTER — LAB (OUTPATIENT)
Dept: PRIMARY CARE CLINIC | Age: 59
End: 2024-07-26

## 2024-07-26 NOTE — PROGRESS NOTES
Patient came in today for 2nd PPD read. Negative paper work signed and scanned and given back to patient . That completed her 2 step TB

## 2024-08-05 ENCOUNTER — TELEPHONE (OUTPATIENT)
Dept: PRIMARY CARE CLINIC | Age: 59
End: 2024-08-05

## 2024-08-05 NOTE — TELEPHONE ENCOUNTER
Nothing higher than 1% cortisone is recommended for the face otherwise it can cause discoloration and thinning of the skin

## 2024-08-05 NOTE — TELEPHONE ENCOUNTER
Patient called in and wanted to know if she could get the 2% hydrocortisone cream instead of the 1% due to face is very itchy. Please advise

## 2024-08-08 DIAGNOSIS — M25.462 SWELLING OF JOINT, KNEE, LEFT: ICD-10-CM

## 2024-08-12 RX ORDER — IBUPROFEN 800 MG/1
TABLET ORAL
Qty: 120 TABLET | Refills: 0 | Status: SHIPPED | OUTPATIENT
Start: 2024-08-12

## 2024-08-30 DIAGNOSIS — L30.9 FACIAL DERMATITIS: ICD-10-CM

## 2024-08-30 RX ORDER — DIAPER,BRIEF,INFANT-TODD,DISP
EACH MISCELLANEOUS
Qty: 30 G | Refills: 0 | Status: SHIPPED | OUTPATIENT
Start: 2024-08-30

## 2024-08-30 RX ORDER — LANOLIN ALCOHOL/MO/W.PET/CERES
400 CREAM (GRAM) TOPICAL 2 TIMES DAILY
Qty: 30 TABLET | OUTPATIENT
Start: 2024-08-30

## 2024-08-30 RX ORDER — CINACALCET 30 MG/1
30 TABLET, FILM COATED ORAL
Qty: 30 TABLET | OUTPATIENT
Start: 2024-08-30

## 2024-09-02 DIAGNOSIS — M25.462 SWELLING OF JOINT, KNEE, LEFT: ICD-10-CM

## 2024-09-02 DIAGNOSIS — E78.5 DYSLIPIDEMIA: ICD-10-CM

## 2024-09-02 DIAGNOSIS — I10 ESSENTIAL HYPERTENSION: ICD-10-CM

## 2024-09-02 DIAGNOSIS — Z76.0 MEDICATION REFILL: ICD-10-CM

## 2024-09-03 RX ORDER — ATORVASTATIN CALCIUM 40 MG/1
40 TABLET, FILM COATED ORAL NIGHTLY
Qty: 90 TABLET | Refills: 0 | Status: SHIPPED | OUTPATIENT
Start: 2024-09-03 | End: 2025-09-03

## 2024-09-03 RX ORDER — ONDANSETRON 4 MG/1
4 TABLET, ORALLY DISINTEGRATING ORAL EVERY 8 HOURS PRN
Qty: 10 TABLET | Refills: 0 | Status: SHIPPED | OUTPATIENT
Start: 2024-09-03 | End: 2025-09-03

## 2024-09-03 RX ORDER — IBUPROFEN 800 MG/1
TABLET, FILM COATED ORAL
Qty: 120 TABLET | Refills: 0 | OUTPATIENT
Start: 2024-09-03

## 2024-09-03 RX ORDER — PSEUDOEPHED/ACETAMINOPH/DIPHEN 30MG-500MG
TABLET ORAL
Qty: 120 TABLET | Refills: 0 | OUTPATIENT
Start: 2024-09-03

## 2024-09-03 RX ORDER — AMLODIPINE BESYLATE 10 MG/1
10 TABLET ORAL DAILY
Qty: 90 TABLET | Refills: 0 | Status: SHIPPED | OUTPATIENT
Start: 2024-09-03 | End: 2025-09-03

## 2024-09-04 ENCOUNTER — OFFICE VISIT (OUTPATIENT)
Dept: PRIMARY CARE CLINIC | Age: 59
End: 2024-09-04
Payer: MEDICAID

## 2024-09-04 VITALS
TEMPERATURE: 97.3 F | HEART RATE: 78 BPM | DIASTOLIC BLOOD PRESSURE: 87 MMHG | SYSTOLIC BLOOD PRESSURE: 135 MMHG | WEIGHT: 185 LBS | OXYGEN SATURATION: 99 % | BODY MASS INDEX: 30.79 KG/M2

## 2024-09-04 DIAGNOSIS — N20.0 CALCULUS OF KIDNEY: ICD-10-CM

## 2024-09-04 DIAGNOSIS — R10.9 RIGHT FLANK DISCOMFORT: Primary | ICD-10-CM

## 2024-09-04 DIAGNOSIS — N20.0 KIDNEY STONE: ICD-10-CM

## 2024-09-04 PROCEDURE — 3079F DIAST BP 80-89 MM HG: CPT | Performed by: NURSE PRACTITIONER

## 2024-09-04 PROCEDURE — 3075F SYST BP GE 130 - 139MM HG: CPT | Performed by: NURSE PRACTITIONER

## 2024-09-04 PROCEDURE — 99213 OFFICE O/P EST LOW 20 MIN: CPT | Performed by: NURSE PRACTITIONER

## 2024-09-04 RX ORDER — TAMSULOSIN HYDROCHLORIDE 0.4 MG/1
0.4 CAPSULE ORAL DAILY
Qty: 30 CAPSULE | Refills: 0 | Status: SHIPPED | OUTPATIENT
Start: 2024-09-04

## 2024-09-04 SDOH — ECONOMIC STABILITY: FOOD INSECURITY: WITHIN THE PAST 12 MONTHS, THE FOOD YOU BOUGHT JUST DIDN'T LAST AND YOU DIDN'T HAVE MONEY TO GET MORE.: NEVER TRUE

## 2024-09-04 SDOH — ECONOMIC STABILITY: FOOD INSECURITY: WITHIN THE PAST 12 MONTHS, YOU WORRIED THAT YOUR FOOD WOULD RUN OUT BEFORE YOU GOT MONEY TO BUY MORE.: NEVER TRUE

## 2024-09-04 SDOH — ECONOMIC STABILITY: INCOME INSECURITY: HOW HARD IS IT FOR YOU TO PAY FOR THE VERY BASICS LIKE FOOD, HOUSING, MEDICAL CARE, AND HEATING?: NOT HARD AT ALL

## 2024-09-04 NOTE — PROGRESS NOTES
Bushra Jarrell (: 1965) is a 59 y.o. female is here for evaluation of the following chief complaint(s): Flank Pain (Kidney Pain)    Assessment/Plan:     Assessment & Plan  Right flank discomfort   Acute condition, new, Supportive care with appropriate antipyretics and fluids.  Flomax once daily given known findings of bilateral renal calcifications with largest being 1 cm in the left    Orders:    tamsulosin (FLOMAX) 0.4 MG capsule; Take 1 capsule by mouth daily    Calculus of kidney    See above    Orders:    tamsulosin (FLOMAX) 0.4 MG capsule; Take 1 capsule by mouth daily    The above diagnosis is a new problem.  We discussed expected course, resolution, and complications of diagnosis in detail.  I advised her to call back or return to office if symptoms worsen/change/persist.        Return if symptoms worsen or fail to improve.    Subjective/Objective:   She complains of right flank pain for 2 weeks.  She denies dysuria, frequency, urgency, burning with urination, constipation, suprapubic pressure.  Since starting she reports her symptoms are not changed. Treatments tried: none.    Constitutional: negative except for chills and fevers  Gastrointestinal: positive for nausea, negative for abdominal pain, constipation, diarrhea, and vomiting  Genitourinary:positive for right flank pain, negative for dysuria, frequency, hematuria, hesitancy, nocturia, and urinary incontinence      Physical Exam:  General: alert, cooperative, and no distress  Abdomen: soft, nontender, nondistended, no masses or organomegaly  Back: right CVA tenderness  /87 (Site: Right Upper Arm, Position: Sitting, Cuff Size: Medium Adult)   Pulse 78   Temp 97.3 °F (36.3 °C) (Temporal)   Wt 83.9 kg (185 lb)   SpO2 99%   BMI 30.79 kg/m²        An electronic signature was used to authenticate this note.  -- Мария Dominguez, JESIKA - CNP

## 2024-09-06 ENCOUNTER — OFFICE VISIT (OUTPATIENT)
Dept: ORTHOPEDIC SURGERY | Age: 59
End: 2024-09-06

## 2024-09-06 VITALS — HEIGHT: 65 IN | WEIGHT: 180 LBS | BODY MASS INDEX: 29.99 KG/M2 | RESPIRATION RATE: 20 BRPM

## 2024-09-06 DIAGNOSIS — M17.0 BILATERAL PRIMARY OSTEOARTHRITIS OF KNEE: Primary | ICD-10-CM

## 2024-09-06 RX ORDER — BUPIVACAINE HYDROCHLORIDE 2.5 MG/ML
4 INJECTION, SOLUTION INFILTRATION; PERINEURAL ONCE
Status: COMPLETED | OUTPATIENT
Start: 2024-09-06 | End: 2024-09-06

## 2024-09-06 RX ADMIN — BUPIVACAINE HYDROCHLORIDE 10 MG: 2.5 INJECTION, SOLUTION INFILTRATION; PERINEURAL at 09:58

## 2024-09-06 NOTE — PROGRESS NOTES
Slight blunting and fraying along the free edge of the body of the lateral  meniscus.  Minimal degenerative intrameniscal signal within the body and  posterior horn of the medial meniscus.  No discrete medial or lateral  meniscal tear by MR criteria.     Mild medial compartment arthrosis.    Procedure:   After informed consent was obtained verbally, the Right knee was sterilely prepped with Betadine and locally anesthetized with ethyl chloride spray at the lateral joint line just lateral to the patellar tendon.  The knee was then injected through this injection site with 2mL of 0.25% Marcaine and then a  Durolane 3mL prefilled syringe injection.  The patient tolerated the procedure well without postinjection complications.  A sterile dressing was applied.    After informed consent was obtained verbally, the Left knee was sterilely prepped with Betadine and locally anesthetized with ethyl chloride spray at the lateral joint line just lateral to the patellar tendon.  The knee was then injected through this injection site with 2mL of 0.25% Marcaine and then a  Durolane 3mL prefilled syringe injection.  The patient tolerated the procedure well without postinjection complications.  A sterile dressing was applied.      Assessment:      1. Bilateral primary osteoarthritis of knee       Plan:   Assessment & Plan     Bushra Jarrell is a 59 y.o. female here in follow for bilateral knee pain due to osteoarthritis. The patient is here for insurance approved bilateral knee Durolane injections.     PLAN:  1. Administer bilateral knee Durolane gel injections. The patient tolerated the injections without complication.  2. Recommend Tylenol or Motrin for any post-injection soreness.  3. MRIs reviewed today in office.      Schedule a follow-up appointment in six weeks to assess the patient's response to the gel injections. The patient was instructed to call our office with any questions or concerns prior to the next appointment.

## 2024-10-03 ENCOUNTER — TELEPHONE (OUTPATIENT)
Dept: PRIMARY CARE CLINIC | Age: 59
End: 2024-10-03

## 2024-10-03 NOTE — TELEPHONE ENCOUNTER
She would need a visit to discuss if she qualifies based on the guidelines for handicap placard's set forth by the [employer]

## 2024-10-03 NOTE — TELEPHONE ENCOUNTER
Pt called in and wanted to know if you could give her a letter for a handicap placard. Please advise

## 2024-10-19 DIAGNOSIS — L30.9 FACIAL DERMATITIS: ICD-10-CM

## 2024-10-21 RX ORDER — DIAPER,BRIEF,INFANT-TODD,DISP
EACH MISCELLANEOUS
Qty: 30 G | Refills: 0 | Status: SHIPPED | OUTPATIENT
Start: 2024-10-21 | End: 2025-10-21

## 2024-11-08 DIAGNOSIS — M25.462 SWELLING OF JOINT, KNEE, LEFT: ICD-10-CM

## 2024-11-08 RX ORDER — IBUPROFEN 800 MG/1
800 TABLET, FILM COATED ORAL EVERY 8 HOURS PRN
Qty: 120 TABLET | Refills: 0 | Status: SHIPPED | OUTPATIENT
Start: 2024-11-08

## 2024-11-08 RX ORDER — PSEUDOEPHED/ACETAMINOPH/DIPHEN 30MG-500MG
500 TABLET ORAL EVERY 6 HOURS PRN
Qty: 120 TABLET | Refills: 0 | Status: SHIPPED | OUTPATIENT
Start: 2024-11-08 | End: 2024-12-08

## 2024-11-19 ENCOUNTER — OFFICE VISIT (OUTPATIENT)
Dept: PRIMARY CARE CLINIC | Age: 59
End: 2024-11-19

## 2024-11-19 VITALS
SYSTOLIC BLOOD PRESSURE: 144 MMHG | TEMPERATURE: 97.5 F | HEART RATE: 77 BPM | OXYGEN SATURATION: 100 % | WEIGHT: 182.8 LBS | BODY MASS INDEX: 30.42 KG/M2 | DIASTOLIC BLOOD PRESSURE: 98 MMHG

## 2024-11-19 DIAGNOSIS — E78.5 DYSLIPIDEMIA: ICD-10-CM

## 2024-11-19 DIAGNOSIS — M65.331 TRIGGER FINGER, RIGHT MIDDLE FINGER: ICD-10-CM

## 2024-11-19 DIAGNOSIS — Z12.31 BREAST CANCER SCREENING BY MAMMOGRAM: ICD-10-CM

## 2024-11-19 DIAGNOSIS — Z23 NEED FOR INFLUENZA VACCINATION: ICD-10-CM

## 2024-11-19 DIAGNOSIS — R25.2 CRAMPING OF HANDS: Primary | ICD-10-CM

## 2024-11-19 NOTE — PROGRESS NOTES
Bushra Jarrell (:  1965) is a 59 y.o. female,Established patient, here for evaluation of the following chief complaint(s):  Hand cramping         Assessment & Plan  Cramping of hands  At this time there is no pain, however last magnesium level 1 month ago was 1.5.  History of hypercalcemia.  Repeat labs, patient agrees    Orders:    Magnesium; Future    Basic Metabolic Panel; Future    Need for influenza vaccination       Orders:    Influenza, FLUCELVAX Trivalent, (age 6 mo+) IM, Preservative Free, 0.5mL    Trigger finger, right middle finger    Given there is no cramping or pain, I suspect trigger finger presentation in the third and fourth digit.  Patient does see orthopedics at the end of the week, did place referral for evaluation of trigger finger to the right hand.  Patient verbalized understanding and agrees with plan    Orders:    Nevaeh Schmidt PA, Orthopaedic Surgery, [place]    Dyslipidemia       Orders:    Lipid Panel; Future    Breast cancer screening by mammogram       Orders:    KARON DIGITAL SCREEN W OR WO CAD BILATERAL; Future      No follow-ups on file.       Subjective   Bushra presents today for evaluation of issue with mobility of the right hand.  She has been having some difficulty fully extending the digits on her right hand, specifically the third and fourth finger.  She notes been going on for a few weeks.  It occurs multiple times throughout the day, not just upon waking.  In general she says there is no pain to the hand or wrist.  No numbness or weakness to the right hand.  She notes that she does take a magnesium supplement, was recently supposed to be taking it twice a day but is having trouble due to increased bowel frequency so lowered the dose back to 1 pill once a day.        Review of Systems       Objective   Physical Exam  Constitutional:       General: She is not in acute distress.     Appearance: Normal appearance.   Musculoskeletal:      Right wrist: Normal.

## 2024-11-26 ENCOUNTER — TELEPHONE (OUTPATIENT)
Dept: ORTHOPEDIC SURGERY | Age: 59
End: 2024-11-26

## 2024-11-26 NOTE — TELEPHONE ENCOUNTER
Spoke with patient since she was scheduled to see Cristian at 9 am and 9:30 am on 12/2. One appt was for her Rt hand and the other appt was scheduled for her knees. Per Cristian; only one appt is needed. Patient requested that she come in at 9:30 therefore 9am appt was canceled. Also patient mentioned that her knees are doing very well since durolane injections she received on 9/6/24 by JOSE Foster.

## 2024-12-04 ENCOUNTER — OFFICE VISIT (OUTPATIENT)
Dept: ORTHOPEDIC SURGERY | Age: 59
End: 2024-12-04
Payer: MEDICAID

## 2024-12-04 VITALS — RESPIRATION RATE: 14 BRPM | HEIGHT: 65 IN | WEIGHT: 182.4 LBS | BODY MASS INDEX: 30.39 KG/M2

## 2024-12-04 DIAGNOSIS — M65.331 TRIGGER FINGER, RIGHT MIDDLE FINGER: Primary | ICD-10-CM

## 2024-12-04 PROCEDURE — 99213 OFFICE O/P EST LOW 20 MIN: CPT | Performed by: PHYSICIAN ASSISTANT

## 2024-12-04 ASSESSMENT — ENCOUNTER SYMPTOMS
COLOR CHANGE: 0
VOMITING: 0
SHORTNESS OF BREATH: 0
COUGH: 0

## 2024-12-04 NOTE — PROGRESS NOTES
[facility] ORTHOPEDICS  [address] [place] [address]  Dept: [phone]  Dept Fax: [phone]        Established Patient - New Problem      Subjective:   Bushra Jarrell is a 59 y.o. year old female who presents to our office today for routine followup regarding her   1. Trigger finger, right middle finger        Chief Complaint   Patient presents with    Hand Pain     Right hand middle finger       HPI   Bushra Jarrell  is a Right dominant female with a  1 month history of right hand pain.   The patient denies numbness and tingling to the fingers. Tiggering of the right ring finger is  noted.   The patient's normal sleep patterns are affected.  Pain is made worse with gripping or lifting with the right hand. The patient has not had a previous corticosteroid injection.  The patient denies a history of diabetes.         Review of Systems   Constitutional:  Negative for activity change and fever.   HENT:  Negative for sneezing.    Respiratory:  Negative for cough and shortness of breath.    Cardiovascular:  Negative for chest pain.   Gastrointestinal:  Negative for vomiting.   Musculoskeletal:  Positive for arthralgias (right ring finger). Negative for joint swelling and myalgias.   Skin:  Negative for color change.   Neurological:  Negative for weakness and numbness.   Psychiatric/Behavioral:  Negative for sleep disturbance.        Objective :   Resp 14   Ht 1.651 m (5' 5\")   Wt 82.7 kg (182 lb 6.4 oz)   BMI 30.35 kg/m²  Body mass index is 30.35 kg/m².  General: Bushra Jarrell is a 59 y.o. female who is alert and oriented and sitting comfortably in our office.  Ortho Exam  MS:  Evaluation of the Right hand shows no outward deformity.  Palpable nodule in the region of the A1 pulley of the Right middle finger is noted.  Active triggering of the middle finger is noted. Patient has full range of motion of the other fingers of the

## 2024-12-05 DIAGNOSIS — L30.9 FACIAL DERMATITIS: ICD-10-CM

## 2024-12-05 RX ORDER — DIAPER,BRIEF,INFANT-TODD,DISP
EACH MISCELLANEOUS
Qty: 30 G | Refills: 0 | Status: SHIPPED | OUTPATIENT
Start: 2024-12-05 | End: 2025-12-05

## 2024-12-08 DIAGNOSIS — I10 ESSENTIAL HYPERTENSION: ICD-10-CM

## 2024-12-08 DIAGNOSIS — Z76.0 MEDICATION REFILL: ICD-10-CM

## 2024-12-09 RX ORDER — AMLODIPINE BESYLATE 10 MG/1
10 TABLET ORAL DAILY
Qty: 90 TABLET | Refills: 1 | Status: SHIPPED | OUTPATIENT
Start: 2024-12-09 | End: 2025-12-09

## 2024-12-11 ENCOUNTER — TELEPHONE (OUTPATIENT)
Dept: DERMATOLOGY | Age: 59
End: 2024-12-11

## 2024-12-11 NOTE — TELEPHONE ENCOUNTER
Patient has a Dermatology appointment scheduled for 12/16/2024. I left an appointment reminder message and new address information on the voicemail.

## 2024-12-18 ENCOUNTER — OFFICE VISIT (OUTPATIENT)
Age: 59
End: 2024-12-18
Payer: MEDICAID

## 2024-12-18 DIAGNOSIS — M65.331 TRIGGER FINGER, RIGHT MIDDLE FINGER: Primary | ICD-10-CM

## 2024-12-18 PROCEDURE — 99204 OFFICE O/P NEW MOD 45 MIN: CPT | Performed by: ORTHOPAEDIC SURGERY

## 2024-12-18 NOTE — PROGRESS NOTES
[facility] Orthopedics & Sports Medicine      [facility] PHYSICIANS [facility]  [facility] ORTHOPAEDICS AND SPORTS MEDICINE  [address]  [place] [address]  Dept: [phone]  Dept Fax: [phone]    Chief Compliant:  Chief Complaint   Patient presents with    Hand Pain     Right long finger        History of Present Illness:  This is a 59 y.o. female who presents to the clinic today for evaluation of right middle finger locking up for 1 months duration, mainly in the morning sometimes it is painful and took Tylenol to help with the pain.  Patient is bothered by due to pain and limitation of completing of hand fist.  Denies any numbness or tingling, no previous trauma.  Works as a CNA.      Physical Exam:    Localized tenderness appreciated at the right middle finger A1 pulley.  She is able to make a full clenched fist.  She does have some triggering today in the office.  Sensation tact light touch throughout the hand no muscle wasting skin is intact.  No significant swelling.      Nursing note and vitals reviewed.     Labs and Imaging:     XR taken today: None     No orders of the defined types were placed in this encounter.      Assessment and Plan:  1. Trigger finger, right middle finger          This is a 59 y.o. female with right middle finger trigger finger for the last month.  Options of management including steroid injection was offered to patient, patient declined steroid injection.  We discussed right middle finger A1 pulley release as an alternative.  She like to have this done under moderate anesthesia.  Risks of surgery including painful scar tissue infection and stiffness were discussed.  Will see her back for right middle finger A1 pulley release under monitored anesthesia.    Attending Physician Statement   I, Ayden Davenport MD, have seen and discussed the care of Bushra VIKKI WalkerWesly  including pertinent history and exam findings, with Resident Physician. I

## 2024-12-19 ENCOUNTER — PREP FOR PROCEDURE (OUTPATIENT)
Age: 59
End: 2024-12-19

## 2024-12-19 DIAGNOSIS — M65.331 ACQUIRED TRIGGER FINGER OF RIGHT MIDDLE FINGER: ICD-10-CM

## 2024-12-21 DIAGNOSIS — E78.5 DYSLIPIDEMIA: ICD-10-CM

## 2024-12-23 ENCOUNTER — TELEPHONE (OUTPATIENT)
Age: 59
End: 2024-12-23

## 2024-12-23 RX ORDER — ATORVASTATIN CALCIUM 40 MG/1
40 TABLET, FILM COATED ORAL NIGHTLY
Qty: 90 TABLET | Refills: 1 | Status: SHIPPED | OUTPATIENT
Start: 2024-12-23 | End: 2025-12-23

## 2024-12-23 NOTE — TELEPHONE ENCOUNTER
Patient called to see if she could come in and get bilateral knee injections, patient has seen Nevaeh Foster for them in the past and wants to see if  would continue doing them. Patient has surgery scheduled in February for her trigger finger and writer unsure if injection would interfere with upcoming surgery  Please return call  Thank you

## 2025-01-13 DIAGNOSIS — M25.462 SWELLING OF JOINT, KNEE, LEFT: ICD-10-CM

## 2025-01-13 DIAGNOSIS — L30.9 FACIAL DERMATITIS: ICD-10-CM

## 2025-01-14 RX ORDER — IBUPROFEN 800 MG/1
TABLET, FILM COATED ORAL
Qty: 120 TABLET | Refills: 0 | Status: SHIPPED | OUTPATIENT
Start: 2025-01-14

## 2025-01-14 RX ORDER — PSEUDOEPHED/ACETAMINOPH/DIPHEN 30MG-500MG
TABLET ORAL
Qty: 120 TABLET | Refills: 0 | Status: SHIPPED | OUTPATIENT
Start: 2025-01-14

## 2025-01-14 RX ORDER — DIAPER,BRIEF,INFANT-TODD,DISP
EACH MISCELLANEOUS
Qty: 28 G | Refills: 0 | Status: SHIPPED | OUTPATIENT
Start: 2025-01-14 | End: 2026-01-14

## 2025-02-06 ENCOUNTER — TELEPHONE (OUTPATIENT)
Age: 60
End: 2025-02-06

## 2025-02-06 DIAGNOSIS — Z01.818 PRE-OP EXAM: Primary | ICD-10-CM

## 2025-02-06 NOTE — TELEPHONE ENCOUNTER
Patient has been scheduled for sx on 2/27. Instructions were given at the time of booking.  I called today and spoke to him/her to confirm date/time/location of sx and reminded him/her to get PAT done.  We also scheduled the post op appointment.

## 2025-02-07 NOTE — PROGRESS NOTES
Patient instructed to remove shoes and socks and instructed to sit in exam chair.  Current PCP is Мария Dominguez APRN - CNP and date of last visit was [id number].   Do you have a follow up visit scheduled?  No  If yes, the date is unknown

## 2025-02-10 ENCOUNTER — OFFICE VISIT (OUTPATIENT)
Dept: PODIATRY | Age: 60
End: 2025-02-10
Payer: MEDICAID

## 2025-02-10 VITALS
SYSTOLIC BLOOD PRESSURE: 133 MMHG | HEIGHT: 65 IN | WEIGHT: 182 LBS | BODY MASS INDEX: 30.32 KG/M2 | HEART RATE: 80 BPM | DIASTOLIC BLOOD PRESSURE: 92 MMHG

## 2025-02-10 DIAGNOSIS — B35.1 ONYCHOMYCOSIS: Primary | ICD-10-CM

## 2025-02-10 DIAGNOSIS — M79.671 RIGHT FOOT PAIN: ICD-10-CM

## 2025-02-10 PROCEDURE — 11720 DEBRIDE NAIL 1-5: CPT

## 2025-02-10 NOTE — PROGRESS NOTES
[facility]  [address]   [address] [place] [address]  Tel: [phone]   Fax: [phone]    Subjective     CC: Diabetic foot exam and painful and elongated toe nails    HPI:  Bushra Jarrell is a 59 y.o. year old female who presents to clinic today for thickened, painful nails with subungual debris.  Patient says that nails have been thickened for quite some time and patient would like to discuss treatment options.  Patient has a history of stroke that resulted in weakness to the left side of her body.  The patient is not diabetic, but admits to history of CKD. The patient denies any other pedal complaints.     Primary care physician is Мария Dominguez APRN - CNP.    ROS:    Constitutional: Denies nausea, vomiting, fever, chills.  Neurologic: No numbness, tingling, and burning in the feet.    Vascular: Denies symptoms of lower extremity claudication.    Skin: Denies open wounds.  Otherwise negative except as noted in the HPI.     PMH:  Past Medical History:   Diagnosis Date    CKD (chronic kidney disease), stage III (HCC)     History of blood transfusion 1984    during child birth    Hyperlipidemia     Hypertension     managed by PCP and Nephrologist  at [facility]    Hyperthyroidism     Kidney stone     PONV (postoperative nausea and vomiting)     TIA (transient ischemic attack) 2018    Does not have a neurologist       Surgical History:   Past Surgical History:   Procedure Laterality Date    CHOLECYSTECTOMY      COLONOSCOPY      x 2 only had polyps once.    LITHOTRIPSY      many times    LITHOTRIPSY Bilateral 4/3/2024    EXTRACORPOREAL SHOCK WAVE LITHOTRIPSY performed by Can Deutsch MD at [facility] OR    ROTATOR CUFF REPAIR Left 12/2022    TUBAL LIGATION         Social History:  Social History     Tobacco Use    Smoking status: Never    Smokeless tobacco: Never   Vaping Use    Vaping status: Never Used   Substance Use Topics    Alcohol use: Never    Drug use: No

## 2025-02-14 ENCOUNTER — HOSPITAL ENCOUNTER (OUTPATIENT)
Age: 60
Discharge: HOME | End: 2025-02-14
Payer: MEDICAID

## 2025-02-14 DIAGNOSIS — Z01.818 PRE-OP EXAM: ICD-10-CM

## 2025-02-14 LAB
ANION GAP SERPL CALCULATED.3IONS-SCNC: 12 MMOL/L (ref 9–16)
BASOPHILS # BLD: 0.04 K/UL (ref 0–0.2)
BASOPHILS NFR BLD: 1 % (ref 0–2)
BUN SERPL-MCNC: 9 MG/DL (ref 6–20)
CALCIUM SERPL-MCNC: 9.8 MG/DL (ref 8.6–10.4)
CHLORIDE SERPL-SCNC: 105 MMOL/L (ref 98–107)
CO2 SERPL-SCNC: 22 MMOL/L (ref 20–31)
CREAT SERPL-MCNC: 1.2 MG/DL (ref 0.6–0.9)
EOSINOPHIL # BLD: 0.11 K/UL (ref 0–0.44)
EOSINOPHILS RELATIVE PERCENT: 3 % (ref 1–4)
ERYTHROCYTE [DISTWIDTH] IN BLOOD BY AUTOMATED COUNT: 11.6 % (ref 11.8–14.4)
GFR, ESTIMATED: 52 ML/MIN/1.73M2
GLUCOSE SERPL-MCNC: 95 MG/DL (ref 74–99)
HCT VFR BLD AUTO: 43.5 % (ref 36.3–47.1)
HGB BLD-MCNC: 13.4 G/DL (ref 11.9–15.1)
IMM GRANULOCYTES # BLD AUTO: <0.03 K/UL (ref 0–0.3)
IMM GRANULOCYTES NFR BLD: 0 %
LYMPHOCYTES NFR BLD: 1.36 K/UL (ref 1.1–3.7)
LYMPHOCYTES RELATIVE PERCENT: 39 % (ref 24–43)
MCH RBC QN AUTO: 28.5 PG (ref 25.2–33.5)
MCHC RBC AUTO-ENTMCNC: 30.8 G/DL (ref 28.4–34.8)
MCV RBC AUTO: 92.4 FL (ref 82.6–102.9)
MONOCYTES NFR BLD: 0.31 K/UL (ref 0.1–1.2)
MONOCYTES NFR BLD: 9 % (ref 3–12)
NEUTROPHILS NFR BLD: 48 % (ref 36–65)
NEUTS SEG NFR BLD: 1.69 K/UL (ref 1.5–8.1)
NRBC BLD-RTO: 0 PER 100 WBC
PLATELET # BLD AUTO: ABNORMAL K/UL (ref 138–453)
PLATELET, FLUORESCENCE: 178 K/UL (ref 138–453)
PLATELETS.RETICULATED NFR BLD AUTO: 13.3 % (ref 1.1–10.3)
POTASSIUM SERPL-SCNC: 4.3 MMOL/L (ref 3.7–5.3)
RBC # BLD AUTO: 4.71 M/UL (ref 3.95–5.11)
SODIUM SERPL-SCNC: 139 MMOL/L (ref 136–145)
WBC OTHER # BLD: 3.5 K/UL (ref 3.5–11.3)

## 2025-02-14 PROCEDURE — 80048 BASIC METABOLIC PNL TOTAL CA: CPT

## 2025-02-14 PROCEDURE — 85025 COMPLETE CBC W/AUTO DIFF WBC: CPT

## 2025-02-14 PROCEDURE — 85055 RETICULATED PLATELET ASSAY: CPT

## 2025-02-14 PROCEDURE — 93005 ELECTROCARDIOGRAM TRACING: CPT

## 2025-02-14 NOTE — H&P
ORTHOPEDIC PATIENT EVALUATION      HPI / Chief Complaint  Bushra Jarrell is a 59 y.o. female who presents for right middle finger trigger finger.    Past Medical History  Bushra  has a past medical history of CKD (chronic kidney disease), stage III (HCC), History of blood transfusion, Hyperlipidemia, Hypertension, Hyperthyroidism, Kidney stone, PONV (postoperative nausea and vomiting), and TIA (transient ischemic attack).    Past Surgical History  Bushra  has a past surgical history that includes Cholecystectomy; Lithotripsy; Rotator cuff repair (Left, 12/2022); Tubal ligation; Colonoscopy; and Lithotripsy (Bilateral, 4/3/2024).    Current Medications  Current Facility-Administered Medications   Medication Dose Route Frequency Provider Last Rate Last Admin    methylPREDNISolone acetate (DEPO-MEDROL) injection 80 mg  80 mg Intra-artICUlar Once Blayne Lester MD        methylPREDNISolone acetate (DEPO-MEDROL) injection 80 mg  80 mg IntraMUSCular Once Blayne Lester MD        bupivacaine (MARCAINE) 0.25 % injection 5 mg  2 mL Intra-artICUlar Once Blayne Lester MD        bupivacaine (MARCAINE) 0.25 % injection 5 mg  2 mL Intra-artICUlar Once Blayne Lester MD         Current Outpatient Medications   Medication Sig Dispense Refill    hydrocortisone 1 % ointment APPLY TOPICALLY 2 TIMES A DAY 28 g 0    Acetaminophen Extra Strength 500 MG TABS TAKE ONE TABLET BY MOUTH EVERY 6 HOURS AS NEEDED FOR PAIN OR FEVER 120 tablet 0    ibuprofen (ADVIL;MOTRIN) 800 MG tablet TAKE ONE TABLET BY MOUTH EVERY 8 HOURS AS NEEDED FOR PAIN OR FEVER 120 tablet 0    atorvastatin (LIPITOR) 40 MG tablet Take 1 tablet by mouth nightly 90 tablet 1    amLODIPine (NORVASC) 10 MG tablet Take 1 tablet by mouth daily 90 tablet 1    tamsulosin (FLOMAX) 0.4 MG capsule Take 1 capsule by mouth daily 30 capsule 0    ondansetron (ZOFRAN ODT) 4 MG disintegrating tablet Take 1 tablet by mouth every 8 hours as needed for Nausea 10 tablet 0    lisinopril  (PRINIVIL;ZESTRIL) 10 MG tablet Take 1 tablet by mouth daily      cinacalcet (SENSIPAR) 30 MG tablet Take 1 tablet by mouth nightly      magnesium oxide (MAG-OX) 400 (240 Mg) MG tablet Take 1 tablet by mouth 2 times daily         Allergies  Allergies have been reviewed.  Bushra has No Known Allergies.    Social History  Bushra  reports that she has never smoked. She has never used smokeless tobacco. She reports that she does not drink alcohol and does not use drugs.    Family History  Bushra's family history is not on file.      Review of Systems   History obtained from the patient.   REVIEW OF SYSTEMS:   Constitution: negative for fever, chills  Musculoskeletal: As noted in the HPI   Neurologic: As noted in the HPI    Physical Exam  There were no vitals taken for this visit.   General Appearance:  No apparent distress  Mental Status: Alert and oriented  Heart: Rate regular  Lungs: Respirations regular, no distress  Abdomen: Soft  Neurovascular: Palpable pulses        Diagnostics and Labs  Relevant diagnostic, laboratory and radiological studies have been reviewed in the Electronic Medical Record.    Assessment and Plan  Bushra Jarrell is a 59 y.o. old female with right middle finger trigger finger.  Plan for right middle finger A1 pulley release.

## 2025-02-15 LAB
EKG ATRIAL RATE: 72 BPM
EKG P AXIS: 64 DEGREES
EKG P-R INTERVAL: 178 MS
EKG Q-T INTERVAL: 410 MS
EKG QRS DURATION: 74 MS
EKG QTC CALCULATION (BAZETT): 448 MS
EKG R AXIS: -5 DEGREES
EKG T AXIS: 20 DEGREES
EKG VENTRICULAR RATE: 72 BPM

## 2025-02-17 NOTE — PROGRESS NOTES
DAY OF SURGERY/PROCEDURE  GUIDELINES    As a patient at the [facility], you can expect quality medical and nursing care that is centered on your individual needs. It is our goal to make your surgical experience as comfortable and excellent as possible.  ________________________________________________________________________    The following instructions are general guidelines, if any information on this sheet is different from what your doctor has instructed you to do, please follow your doctor's instructions.    Please arrive on 2/27/2025 @ 0900      Enter through entrance C. Check in at registration     Upon arrival you will be taken to the pre-operative area to get ready for surgery, your family will stay in the waiting room and visit with you once you are ready for surgery. Due to special limitations please limit visitation to 1-2 members of your family at a time. When it is time for surgery your family will return to the waiting room.    Nothing to eat, drink, smoke, suck or chew after midnight (no water, gum, mints, cigarettes, cigars, pipes, snuff, chewing tobacco, etc.) or your surgery may be canceled.     Take a shower or bath on the morning of your surgery/procedure (Hibiclens if directed) Do not apply any lotions.    Brush your teeth, but do not swallow any water    IN CASE OF ILLNESS - If you have a cold or flu symptoms (high fever, runny nose, sore throat, cough, etc.) rash, nausea, vomiting, loose stools, and/or recent contact with someone who has a contagious disease (chick pox, measles, etc.) please call your doctor before coming to the surgery center    Take a small sip of water with (pt takes the BP pills at night usually)    If applicable bring your:  Inhaler (s)  Hearing aid(s)  Eyeglasses and Case (If you wear contacts they have to be removed before surgery, bring case and solution)  CPAP     DO NOT take anticoagulants (blood thinners, aspirin or aspirin-containing

## 2025-02-26 ENCOUNTER — ANESTHESIA EVENT (OUTPATIENT)
Dept: OPERATING ROOM | Age: 60
End: 2025-02-26
Payer: MEDICAID

## 2025-02-27 ENCOUNTER — ANESTHESIA (OUTPATIENT)
Dept: OPERATING ROOM | Age: 60
End: 2025-02-27
Payer: MEDICAID

## 2025-02-27 ENCOUNTER — HOSPITAL ENCOUNTER (OUTPATIENT)
Age: 60
Setting detail: OUTPATIENT SURGERY
Discharge: HOME | End: 2025-02-27
Attending: ORTHOPAEDIC SURGERY | Admitting: ORTHOPAEDIC SURGERY
Payer: MEDICAID

## 2025-02-27 VITALS
DIASTOLIC BLOOD PRESSURE: 87 MMHG | WEIGHT: 181 LBS | BODY MASS INDEX: 30.16 KG/M2 | SYSTOLIC BLOOD PRESSURE: 130 MMHG | TEMPERATURE: 97.7 F | HEIGHT: 65 IN | HEART RATE: 70 BPM | OXYGEN SATURATION: 97 % | RESPIRATION RATE: 14 BRPM

## 2025-02-27 DIAGNOSIS — G89.18 POST-OP PAIN: Primary | ICD-10-CM

## 2025-02-27 PROCEDURE — 2500000003 HC RX 250 WO HCPCS: Performed by: NURSE ANESTHETIST, CERTIFIED REGISTERED

## 2025-02-27 PROCEDURE — 26055 INCISE FINGER TENDON SHEATH: CPT | Performed by: ORTHOPAEDIC SURGERY

## 2025-02-27 PROCEDURE — 7100000011 HC PHASE II RECOVERY - ADDTL 15 MIN: Performed by: ORTHOPAEDIC SURGERY

## 2025-02-27 PROCEDURE — 2580000003 HC RX 258: Performed by: ANESTHESIOLOGY

## 2025-02-27 PROCEDURE — 2500000003 HC RX 250 WO HCPCS: Performed by: ANESTHESIOLOGY

## 2025-02-27 PROCEDURE — 6370000000 HC RX 637 (ALT 250 FOR IP): Performed by: STUDENT IN AN ORGANIZED HEALTH CARE EDUCATION/TRAINING PROGRAM

## 2025-02-27 PROCEDURE — 3600000012 HC SURGERY LEVEL 2 ADDTL 15MIN: Performed by: ORTHOPAEDIC SURGERY

## 2025-02-27 PROCEDURE — 3700000000 HC ANESTHESIA ATTENDED CARE: Performed by: ORTHOPAEDIC SURGERY

## 2025-02-27 PROCEDURE — 6360000002 HC RX W HCPCS: Performed by: ORTHOPAEDIC SURGERY

## 2025-02-27 PROCEDURE — 3600000002 HC SURGERY LEVEL 2 BASE: Performed by: ORTHOPAEDIC SURGERY

## 2025-02-27 PROCEDURE — 3700000001 HC ADD 15 MINUTES (ANESTHESIA): Performed by: ORTHOPAEDIC SURGERY

## 2025-02-27 PROCEDURE — 2709999900 HC NON-CHARGEABLE SUPPLY: Performed by: ORTHOPAEDIC SURGERY

## 2025-02-27 PROCEDURE — 6360000002 HC RX W HCPCS: Performed by: NURSE ANESTHETIST, CERTIFIED REGISTERED

## 2025-02-27 PROCEDURE — 7100000010 HC PHASE II RECOVERY - FIRST 15 MIN: Performed by: ORTHOPAEDIC SURGERY

## 2025-02-27 RX ORDER — SODIUM CHLORIDE 0.9 % (FLUSH) 0.9 %
5-40 SYRINGE (ML) INJECTION PRN
Status: DISCONTINUED | OUTPATIENT
Start: 2025-02-27 | End: 2025-02-27 | Stop reason: HOSPADM

## 2025-02-27 RX ORDER — SODIUM CHLORIDE 9 MG/ML
INJECTION, SOLUTION INTRAVENOUS PRN
Status: CANCELLED | OUTPATIENT
Start: 2025-02-27

## 2025-02-27 RX ORDER — LIDOCAINE HYDROCHLORIDE 10 MG/ML
INJECTION, SOLUTION EPIDURAL; INFILTRATION; INTRACAUDAL; PERINEURAL
Status: DISCONTINUED | OUTPATIENT
Start: 2025-02-27 | End: 2025-02-27 | Stop reason: SDUPTHER

## 2025-02-27 RX ORDER — SODIUM CHLORIDE 9 MG/ML
INJECTION, SOLUTION INTRAVENOUS PRN
Status: DISCONTINUED | OUTPATIENT
Start: 2025-02-27 | End: 2025-02-27 | Stop reason: HOSPADM

## 2025-02-27 RX ORDER — NALOXONE HYDROCHLORIDE 0.4 MG/ML
INJECTION, SOLUTION INTRAMUSCULAR; INTRAVENOUS; SUBCUTANEOUS PRN
Status: CANCELLED | OUTPATIENT
Start: 2025-02-27

## 2025-02-27 RX ORDER — SODIUM CHLORIDE 0.9 % (FLUSH) 0.9 %
5-40 SYRINGE (ML) INJECTION PRN
Status: CANCELLED | OUTPATIENT
Start: 2025-02-27

## 2025-02-27 RX ORDER — SODIUM CHLORIDE 0.9 % (FLUSH) 0.9 %
5-40 SYRINGE (ML) INJECTION EVERY 12 HOURS SCHEDULED
Status: DISCONTINUED | OUTPATIENT
Start: 2025-02-27 | End: 2025-02-27 | Stop reason: HOSPADM

## 2025-02-27 RX ORDER — TRAMADOL HYDROCHLORIDE 50 MG/1
50 TABLET ORAL EVERY 4 HOURS PRN
Qty: 20 TABLET | Refills: 0 | Status: SHIPPED | OUTPATIENT
Start: 2025-02-27 | End: 2025-03-06

## 2025-02-27 RX ORDER — HYDRALAZINE HYDROCHLORIDE 20 MG/ML
10 INJECTION INTRAMUSCULAR; INTRAVENOUS
Status: CANCELLED | OUTPATIENT
Start: 2025-02-27

## 2025-02-27 RX ORDER — LABETALOL HYDROCHLORIDE 5 MG/ML
10 INJECTION, SOLUTION INTRAVENOUS
Status: CANCELLED | OUTPATIENT
Start: 2025-02-27

## 2025-02-27 RX ORDER — IBUPROFEN 800 MG/1
800 TABLET, FILM COATED ORAL EVERY 8 HOURS PRN
Qty: 90 TABLET | Refills: 0 | Status: SHIPPED | OUTPATIENT
Start: 2025-02-27

## 2025-02-27 RX ORDER — SODIUM CHLORIDE 0.9 % (FLUSH) 0.9 %
5-40 SYRINGE (ML) INJECTION EVERY 12 HOURS SCHEDULED
Status: CANCELLED | OUTPATIENT
Start: 2025-02-27

## 2025-02-27 RX ORDER — ROPIVACAINE HYDROCHLORIDE 5 MG/ML
INJECTION, SOLUTION EPIDURAL; INFILTRATION; PERINEURAL PRN
Status: DISCONTINUED | OUTPATIENT
Start: 2025-02-27 | End: 2025-02-27 | Stop reason: ALTCHOICE

## 2025-02-27 RX ORDER — SODIUM CHLORIDE, SODIUM LACTATE, POTASSIUM CHLORIDE, CALCIUM CHLORIDE 600; 310; 30; 20 MG/100ML; MG/100ML; MG/100ML; MG/100ML
INJECTION, SOLUTION INTRAVENOUS CONTINUOUS
Status: DISCONTINUED | OUTPATIENT
Start: 2025-02-27 | End: 2025-02-27 | Stop reason: HOSPADM

## 2025-02-27 RX ORDER — SCOPOLAMINE 1 MG/3D
1 PATCH, EXTENDED RELEASE TRANSDERMAL ONCE
Status: DISCONTINUED | OUTPATIENT
Start: 2025-02-27 | End: 2025-02-27 | Stop reason: HOSPADM

## 2025-02-27 RX ORDER — ROPIVACAINE HYDROCHLORIDE 5 MG/ML
INJECTION, SOLUTION EPIDURAL; INFILTRATION; PERINEURAL
Status: DISCONTINUED
Start: 2025-02-27 | End: 2025-02-27 | Stop reason: HOSPADM

## 2025-02-27 RX ORDER — LIDOCAINE HYDROCHLORIDE 10 MG/ML
1 INJECTION, SOLUTION EPIDURAL; INFILTRATION; INTRACAUDAL; PERINEURAL
Status: DISCONTINUED | OUTPATIENT
Start: 2025-02-27 | End: 2025-02-27 | Stop reason: HOSPADM

## 2025-02-27 RX ORDER — PROCHLORPERAZINE EDISYLATE 5 MG/ML
5 INJECTION INTRAMUSCULAR; INTRAVENOUS
Status: CANCELLED | OUTPATIENT
Start: 2025-02-27 | End: 2025-02-28

## 2025-02-27 RX ORDER — PROPOFOL 10 MG/ML
INJECTION, EMULSION INTRAVENOUS
Status: DISCONTINUED | OUTPATIENT
Start: 2025-02-27 | End: 2025-02-27 | Stop reason: SDUPTHER

## 2025-02-27 RX ORDER — DEXMEDETOMIDINE HYDROCHLORIDE 100 UG/ML
INJECTION, SOLUTION INTRAVENOUS
Status: DISCONTINUED | OUTPATIENT
Start: 2025-02-27 | End: 2025-02-27 | Stop reason: SDUPTHER

## 2025-02-27 RX ADMIN — PROPOFOL 60 MG: 10 INJECTION, EMULSION INTRAVENOUS at 09:45

## 2025-02-27 RX ADMIN — SODIUM CHLORIDE, PRESERVATIVE FREE 10 ML: 5 INJECTION INTRAVENOUS at 09:35

## 2025-02-27 RX ADMIN — SODIUM CHLORIDE: 0.9 INJECTION, SOLUTION INTRAVENOUS at 09:35

## 2025-02-27 RX ADMIN — PROPOFOL 90 MG: 10 INJECTION, EMULSION INTRAVENOUS at 09:41

## 2025-02-27 RX ADMIN — PROPOFOL 40 MG: 10 INJECTION, EMULSION INTRAVENOUS at 09:49

## 2025-02-27 RX ADMIN — LIDOCAINE HYDROCHLORIDE 50 MG: 10 INJECTION, SOLUTION EPIDURAL; INFILTRATION; INTRACAUDAL; PERINEURAL at 09:41

## 2025-02-27 RX ADMIN — DEXMEDETOMIDINE HCL 12 MCG: 100 INJECTION INTRAVENOUS at 09:41

## 2025-02-27 ASSESSMENT — PAIN DESCRIPTION - DESCRIPTORS: DESCRIPTORS: ACHING

## 2025-02-27 ASSESSMENT — PAIN - FUNCTIONAL ASSESSMENT
PAIN_FUNCTIONAL_ASSESSMENT: NONE - DENIES PAIN
PAIN_FUNCTIONAL_ASSESSMENT: 0-10

## 2025-02-27 NOTE — DISCHARGE INSTRUCTIONS
Activity-activity as tolerated with operative extremity    Dressing-change dressing after 2 days then okay to shower and get wet.    Activity  You have had anesthesia today  Do not drive, operate heavy equipment, consume alcoholic beverages, or make any important decisions  for 24 hours   If you are taking pain medication: Do not drive or consume alcohol.  Take your time changing positions today. You may feel light headed or dizzy if you move too quickly.   Continue your home medications as ordered by your physician.  Diet   You can eat your normal diet when you feel well. You should start off with bland foods like chicken soup, toast, or yogurt. Then advance as tolerated.  Drink plenty of fluids (unless your doctor tells you not to). Your urine should be very lightly colored without a strong odor.       Call your doctor now or seek immediate medical care if:    You have pain that does not get better after you take pain medicine.    You have a fever over 101°F.    You have chills    You have signs of infection, such as:   Increased pain, swelling, warmth, or redness.   Red streaks leading from the incision.   Pus draining from the incision.

## 2025-02-27 NOTE — ANESTHESIA PRE PROCEDURE
Surgical History:        Procedure Laterality Date    CHOLECYSTECTOMY      COLONOSCOPY      x 2 only had polyps once.    LITHOTRIPSY      many times    LITHOTRIPSY Bilateral 4/3/2024    EXTRACORPOREAL SHOCK WAVE LITHOTRIPSY performed by Can Deutsch MD at [facility] OR    ROTATOR CUFF REPAIR Left 12/2022    TUBAL LIGATION         Social History:    Social History     Tobacco Use    Smoking status: Never    Smokeless tobacco: Never   Substance Use Topics    Alcohol use: Never                                Counseling given: Not Answered      Vital Signs (Current):   Vitals:    02/17/25 1404   Weight: 81.6 kg (180 lb)   Height: 1.651 m (5' 5\")                                              BP Readings from Last 3 Encounters:   02/10/25 (!) 133/92   11/19/24 (!) 144/98   09/04/24 135/87       NPO Status: Time of last liquid consumption: 2200                        Time of last solid consumption: 2000                        Date of last liquid consumption: 02/26/25                        Date of last solid food consumption: 02/26/25    BMI:   Wt Readings from Last 3 Encounters:   02/17/25 81.6 kg (180 lb)   02/10/25 82.6 kg (182 lb)   12/04/24 82.7 kg (182 lb 6.4 oz)     Body mass index is 29.95 kg/m².    CBC:   Lab Results   Component Value Date/Time    WBC 3.5 02/14/2025 10:40 AM    RBC 4.71 02/14/2025 10:40 AM    HGB 13.4 02/14/2025 10:40 AM    HCT 43.5 02/14/2025 10:40 AM    MCV 92.4 02/14/2025 10:40 AM    RDW 11.6 02/14/2025 10:40 AM    PLT See Reflexed IPF Result 02/14/2025 10:40 AM       CMP:   Lab Results   Component Value Date/Time     02/14/2025 10:40 AM    K 4.3 02/14/2025 10:40 AM     02/14/2025 10:40 AM    CO2 22 02/14/2025 10:40 AM    BUN 9 02/14/2025 10:40 AM    CREATININE 1.2 02/14/2025 10:40 AM    GFRAA >60 11/22/2020 06:12 PM    LABGLOM 52 02/14/2025 10:40 AM    LABGLOM 53 09/26/2023 11:29 AM    GLUCOSE 95 02/14/2025 10:40 AM    CALCIUM 9.8 02/14/2025 10:40 AM    BILITOT 0.6 09/26/2023

## 2025-02-27 NOTE — OP NOTE
of the procedure including positioning, assistance during the procedure, implant placement if needed, and closure if needed.  There was no senior/qualified orthopedic resident with appropriate level of training available to assist.     Electronically signed by Ayden Davenport MD on 2/27/2025 at 9:58 AM

## 2025-02-27 NOTE — ANESTHESIA POSTPROCEDURE EVALUATION
Department of Anesthesiology  Postprocedure Note    Patient: Bushra Jarrell  MRN: [id number]  YOB: 1965  Date of evaluation: 2/27/2025    Procedure Summary       Date: 02/27/25 Room / Location: [address]    Anesthesia Start: 0939 Anesthesia Stop: 1010    Procedure: RIGHT MIDDLE FINGER TRIGGER RELEASE/TENOSYNOVECTOMY (Right: Hand) Diagnosis:       Acquired trigger finger of right middle finger      (Acquired trigger finger of right middle finger [M65.331])    Surgeons: Ayden Davenport MD Responsible Provider: Rahul Booker MD    Anesthesia Type: General ASA Status: 3            Anesthesia Type: General    Black Phase I: Black Score: 10    Black Phase II: Black Score: 9    Anesthesia Post Evaluation    Patient location during evaluation: PACU  Patient participation: complete - patient participated  Level of consciousness: awake and awake and alert  Pain score: 2  Nausea & Vomiting: no nausea and no vomiting  Cardiovascular status: hemodynamically stable and blood pressure returned to baseline  Respiratory status: acceptable  Hydration status: euvolemic  Multimodal analgesia pain management approach  Pain management: adequate    No notable events documented.

## 2025-03-03 ENCOUNTER — TELEPHONE (OUTPATIENT)
Age: 60
End: 2025-03-03

## 2025-03-03 DIAGNOSIS — R11.0 NAUSEA: Primary | ICD-10-CM

## 2025-03-03 RX ORDER — PROMETHAZINE HYDROCHLORIDE 25 MG/1
25 TABLET ORAL 4 TIMES DAILY PRN
Qty: 20 TABLET | Refills: 0 | Status: SHIPPED | OUTPATIENT
Start: 2025-03-03 | End: 2025-03-10

## 2025-03-12 ENCOUNTER — OFFICE VISIT (OUTPATIENT)
Age: 60
End: 2025-03-12
Payer: MEDICAID

## 2025-03-12 ENCOUNTER — PREP FOR PROCEDURE (OUTPATIENT)
Age: 60
End: 2025-03-12

## 2025-03-12 VITALS — BODY MASS INDEX: 30.16 KG/M2 | HEIGHT: 65 IN | WEIGHT: 181 LBS

## 2025-03-12 DIAGNOSIS — Z98.890 S/P TRIGGER FINGER RELEASE: ICD-10-CM

## 2025-03-12 DIAGNOSIS — M65.321 TRIGGER INDEX FINGER OF RIGHT HAND: Primary | ICD-10-CM

## 2025-03-12 DIAGNOSIS — M65.321 ACQUIRED TRIGGER FINGER OF RIGHT INDEX FINGER: ICD-10-CM

## 2025-03-12 PROCEDURE — 99214 OFFICE O/P EST MOD 30 MIN: CPT

## 2025-03-12 NOTE — PROGRESS NOTES
[facility] Orthopedics & Sports Medicine    [facility] Orthopaedics and Sports Medicine  [address] [place] [address], [address]  [place] [address]    Surgery:    2/27/2025  Right Middle Finger Trigger Release/tenosynovectomy - Right    History of Present Illness:    This is a 59 y.o. female who presents to the clinic today for post op follow up for Right Middle Finger Trigger Release/tenosynovectomy - Right on 2/27/2025 .  Patient states overall she is doing well however she notes that her index finger is also triggering today.  She said this is also been going on for the past 3 months or so.  She notes that she has some tenderness at the base of her index finger.  She is not taking any medications for pain.  She is overall her middle finger is doing very well.  No major concerns at this point.  She presents today for further evaluation.    Physical Exam:  Right hand: Incision site appears to be clean, dry, intact and there is no seepage or drainage coming from the incision site.  There is no erythema around the incision site.  No signs infectious process ongoing.  She does have tenderness to palpation over the index finger A1 pulley.  Some active triggering on exam today.  She is able to form a fist is able to extend her fingers.  She and abduct and adduct her fingers.    Imaging:  None    Plan:  Regarding the middle finger, I did go over the general course recovery from this procedure with the patient.  I taught her some scar tissue massage and some stretching exercises that she can perform on her own.  Regarding the right index finger, she does have trigger finger of the right index finger.  I did offer her an injection over the A1 pulley which she politely declined.  We did talk about surgical intervention which includes right index finger A1 pulley release.  She like to have this done under monitored anesthesia.  Risks and benefits of this procedure were explained to the patient as well as the general course

## 2025-04-07 DIAGNOSIS — L30.9 FACIAL DERMATITIS: ICD-10-CM

## 2025-04-07 DIAGNOSIS — M25.462 SWELLING OF JOINT, KNEE, LEFT: ICD-10-CM

## 2025-04-07 RX ORDER — DIAPER,BRIEF,INFANT-TODD,DISP
EACH MISCELLANEOUS
Qty: 28 G | Refills: 0 | Status: SHIPPED | OUTPATIENT
Start: 2025-04-07

## 2025-04-07 RX ORDER — PSEUDOEPHED/ACETAMINOPH/DIPHEN 30MG-500MG
TABLET ORAL
Qty: 120 TABLET | Refills: 0 | Status: SHIPPED | OUTPATIENT
Start: 2025-04-07

## 2025-04-10 ENCOUNTER — TELEPHONE (OUTPATIENT)
Age: 60
End: 2025-04-10

## 2025-04-10 NOTE — TELEPHONE ENCOUNTER
Patient has been scheduled for sx on 5/1. Instructions were given at the time of booking.  I called today and spoke to him/her to confirm date/time/location of sx. She doesn't need PAT. We also scheduled the post op appointment.

## 2025-04-17 NOTE — PROGRESS NOTES
DAY OF SURGERY/PROCEDURE  GUIDELINES    As a patient at the [facility], you can expect quality medical and nursing care that is centered on your individual needs. It is our goal to make your surgical experience as comfortable and excellent as possible.  ________________________________________________________________________    The following instructions are general guidelines, if any information on this sheet is different from what your doctor has instructed you to do, please follow your doctor's instructions.    Please arrive on 5/1/2025 @ 1015      Enter through entrance C. Check in at registration     Upon arrival you will be taken to the pre-operative area to get ready for surgery, your family will stay in the waiting room and visit with you once you are ready for surgery. Due to special limitations please limit visitation to 1-2 members of your family at a time. When it is time for surgery your family will return to the waiting room.    Nothing to eat, drink, smoke, suck or chew after midnight (no water, gum, mints, cigarettes, cigars, pipes, snuff, chewing tobacco, etc.) or your surgery may be canceled.     Take a shower or bath on the morning of your surgery/procedure (Hibiclens if directed) Do not apply any lotions.    Brush your teeth, but do not swallow any water    IN CASE OF ILLNESS - If you have a cold or flu symptoms (high fever, runny nose, sore throat, cough, etc.) rash, nausea, vomiting, loose stools, and/or recent contact with someone who has a contagious disease (chick pox, measles, etc.) please call your doctor before coming to the surgery center    Take a small sip of water with   (Pt normally takes her BP pills at night)    If applicable bring your:  Inhaler (s)  Hearing aid(s)  Eyeglasses and Case (If you wear contacts they have to be removed before surgery, bring case and solution)  CPAP     DO NOT take anticoagulants (blood thinners, aspirin or aspirin-containing

## 2025-04-29 NOTE — H&P
ORTHOPEDIC PATIENT EVALUATION      HPI / Chief Complaint  Bushra Jarrell is a 59 y.o. female who presents for right index finger trigger finger.    Past Medical History  Bushra  has a past medical history of Arthritis, CKD (chronic kidney disease), stage III (HCC), History of blood transfusion, Hyperlipidemia, Hypertension, Hyperthyroidism, Kidney stone, PONV (postoperative nausea and vomiting), TIA (transient ischemic attack), and Trigger finger.    Past Surgical History  Bushra  has a past surgical history that includes Cholecystectomy; Lithotripsy; Rotator cuff repair (Left, 12/2022); Tubal ligation; Colonoscopy; Lithotripsy (Bilateral, 4/3/2024); and Hand surgery (Right, 2/27/2025).    Current Medications  Current Facility-Administered Medications   Medication Dose Route Frequency Provider Last Rate Last Admin    methylPREDNISolone acetate (DEPO-MEDROL) injection 80 mg  80 mg Intra-artICUlar Once Blayne Lester MD        methylPREDNISolone acetate (DEPO-MEDROL) injection 80 mg  80 mg IntraMUSCular Once Blayne Lester MD        bupivacaine (MARCAINE) 0.25 % injection 5 mg  2 mL Intra-artICUlar Once Blayne Lester MD        bupivacaine (MARCAINE) 0.25 % injection 5 mg  2 mL Intra-artICUlar Once Blayne Lester MD         Current Outpatient Medications   Medication Sig Dispense Refill    atorvastatin (LIPITOR) 40 MG tablet Take 1 tablet by mouth nightly 90 tablet 1    amLODIPine (NORVASC) 10 MG tablet Take 1 tablet by mouth daily 90 tablet 1    lisinopril (PRINIVIL;ZESTRIL) 10 MG tablet Take 1 tablet by mouth daily      cinacalcet (SENSIPAR) 30 MG tablet Take 1 tablet by mouth nightly      Acetaminophen Extra Strength 500 MG TABS TAKE ONE TABLET BY MOUTH EVERY 6 HOURS IF NEEDED FOR PAIN OR FEVER 120 tablet 0    hydrocortisone 1 % ointment APPLY TOPICALLY 2 TIMES A DAY 28 g 0    ibuprofen (ADVIL;MOTRIN) 800 MG tablet Take 1 tablet by mouth every 8 hours as needed for Pain 90 tablet 0    tamsulosin (FLOMAX) 0.4 MG capsule

## 2025-04-30 ENCOUNTER — ANESTHESIA EVENT (OUTPATIENT)
Dept: OPERATING ROOM | Age: 60
End: 2025-04-30
Payer: MEDICAID

## 2025-05-01 ENCOUNTER — HOSPITAL ENCOUNTER (OUTPATIENT)
Age: 60
Setting detail: OUTPATIENT SURGERY
Discharge: HOME | End: 2025-05-01
Attending: ORTHOPAEDIC SURGERY | Admitting: ORTHOPAEDIC SURGERY
Payer: MEDICAID

## 2025-05-01 ENCOUNTER — ANESTHESIA (OUTPATIENT)
Dept: OPERATING ROOM | Age: 60
End: 2025-05-01
Payer: MEDICAID

## 2025-05-01 VITALS
RESPIRATION RATE: 13 BRPM | BODY MASS INDEX: 30.12 KG/M2 | HEIGHT: 65 IN | SYSTOLIC BLOOD PRESSURE: 119 MMHG | DIASTOLIC BLOOD PRESSURE: 79 MMHG | OXYGEN SATURATION: 100 % | HEART RATE: 68 BPM | TEMPERATURE: 97.4 F | WEIGHT: 180.8 LBS

## 2025-05-01 DIAGNOSIS — G89.18 POST-OP PAIN: Primary | ICD-10-CM

## 2025-05-01 PROBLEM — M65.321 TRIGGER FINGER, RIGHT INDEX FINGER: Status: ACTIVE | Noted: 2024-12-18

## 2025-05-01 PROCEDURE — 3600000012 HC SURGERY LEVEL 2 ADDTL 15MIN: Performed by: ORTHOPAEDIC SURGERY

## 2025-05-01 PROCEDURE — 7100000010 HC PHASE II RECOVERY - FIRST 15 MIN: Performed by: ORTHOPAEDIC SURGERY

## 2025-05-01 PROCEDURE — 26055 INCISE FINGER TENDON SHEATH: CPT | Performed by: ORTHOPAEDIC SURGERY

## 2025-05-01 PROCEDURE — 7100000011 HC PHASE II RECOVERY - ADDTL 15 MIN: Performed by: ORTHOPAEDIC SURGERY

## 2025-05-01 PROCEDURE — 3600000002 HC SURGERY LEVEL 2 BASE: Performed by: ORTHOPAEDIC SURGERY

## 2025-05-01 PROCEDURE — 6370000000 HC RX 637 (ALT 250 FOR IP)

## 2025-05-01 PROCEDURE — 6360000002 HC RX W HCPCS: Performed by: NURSE ANESTHETIST, CERTIFIED REGISTERED

## 2025-05-01 PROCEDURE — 6360000002 HC RX W HCPCS: Performed by: ORTHOPAEDIC SURGERY

## 2025-05-01 PROCEDURE — 3700000001 HC ADD 15 MINUTES (ANESTHESIA): Performed by: ORTHOPAEDIC SURGERY

## 2025-05-01 PROCEDURE — 2580000003 HC RX 258: Performed by: ANESTHESIOLOGY

## 2025-05-01 PROCEDURE — 3700000000 HC ANESTHESIA ATTENDED CARE: Performed by: ORTHOPAEDIC SURGERY

## 2025-05-01 PROCEDURE — 2709999900 HC NON-CHARGEABLE SUPPLY: Performed by: ORTHOPAEDIC SURGERY

## 2025-05-01 RX ORDER — KETOROLAC TROMETHAMINE 30 MG/ML
INJECTION, SOLUTION INTRAMUSCULAR; INTRAVENOUS
Status: DISCONTINUED | OUTPATIENT
Start: 2025-05-01 | End: 2025-05-01 | Stop reason: SDUPTHER

## 2025-05-01 RX ORDER — SCOPOLAMINE 1 MG/3D
1 PATCH, EXTENDED RELEASE TRANSDERMAL ONCE
Status: DISCONTINUED | OUTPATIENT
Start: 2025-05-01 | End: 2025-05-01 | Stop reason: HOSPADM

## 2025-05-01 RX ORDER — SCOPOLAMINE 1 MG/3D
PATCH, EXTENDED RELEASE TRANSDERMAL
Status: DISCONTINUED
Start: 2025-05-01 | End: 2025-05-01 | Stop reason: HOSPADM

## 2025-05-01 RX ORDER — MIDAZOLAM HYDROCHLORIDE 1 MG/ML
INJECTION, SOLUTION INTRAMUSCULAR; INTRAVENOUS
Status: DISCONTINUED | OUTPATIENT
Start: 2025-05-01 | End: 2025-05-01 | Stop reason: SDUPTHER

## 2025-05-01 RX ORDER — HYDRALAZINE HYDROCHLORIDE 20 MG/ML
10 INJECTION INTRAMUSCULAR; INTRAVENOUS
Status: DISCONTINUED | OUTPATIENT
Start: 2025-05-01 | End: 2025-05-01 | Stop reason: HOSPADM

## 2025-05-01 RX ORDER — SODIUM CHLORIDE 0.9 % (FLUSH) 0.9 %
5-40 SYRINGE (ML) INJECTION PRN
Status: DISCONTINUED | OUTPATIENT
Start: 2025-05-01 | End: 2025-05-01 | Stop reason: HOSPADM

## 2025-05-01 RX ORDER — NALOXONE HYDROCHLORIDE 0.4 MG/ML
INJECTION, SOLUTION INTRAMUSCULAR; INTRAVENOUS; SUBCUTANEOUS PRN
Status: DISCONTINUED | OUTPATIENT
Start: 2025-05-01 | End: 2025-05-01 | Stop reason: HOSPADM

## 2025-05-01 RX ORDER — LIDOCAINE HYDROCHLORIDE 10 MG/ML
INJECTION, SOLUTION EPIDURAL; INFILTRATION; INTRACAUDAL; PERINEURAL
Status: DISCONTINUED | OUTPATIENT
Start: 2025-05-01 | End: 2025-05-01 | Stop reason: SDUPTHER

## 2025-05-01 RX ORDER — SODIUM CHLORIDE 9 MG/ML
INJECTION, SOLUTION INTRAVENOUS CONTINUOUS
Status: DISCONTINUED | OUTPATIENT
Start: 2025-05-01 | End: 2025-05-01 | Stop reason: HOSPADM

## 2025-05-01 RX ORDER — LIDOCAINE HYDROCHLORIDE AND EPINEPHRINE 10; 10 MG/ML; UG/ML
INJECTION, SOLUTION INFILTRATION; PERINEURAL
Status: DISCONTINUED
Start: 2025-05-01 | End: 2025-05-01 | Stop reason: HOSPADM

## 2025-05-01 RX ORDER — SODIUM CHLORIDE 9 MG/ML
INJECTION, SOLUTION INTRAVENOUS PRN
Status: DISCONTINUED | OUTPATIENT
Start: 2025-05-01 | End: 2025-05-01 | Stop reason: HOSPADM

## 2025-05-01 RX ORDER — SODIUM CHLORIDE, SODIUM LACTATE, POTASSIUM CHLORIDE, CALCIUM CHLORIDE 600; 310; 30; 20 MG/100ML; MG/100ML; MG/100ML; MG/100ML
INJECTION, SOLUTION INTRAVENOUS CONTINUOUS
Status: DISCONTINUED | OUTPATIENT
Start: 2025-05-01 | End: 2025-05-01 | Stop reason: HOSPADM

## 2025-05-01 RX ORDER — DEXAMETHASONE SODIUM PHOSPHATE 4 MG/ML
INJECTION, SOLUTION INTRA-ARTICULAR; INTRALESIONAL; INTRAMUSCULAR; INTRAVENOUS; SOFT TISSUE
Status: DISCONTINUED | OUTPATIENT
Start: 2025-05-01 | End: 2025-05-01 | Stop reason: SDUPTHER

## 2025-05-01 RX ORDER — SODIUM CHLORIDE 0.9 % (FLUSH) 0.9 %
5-40 SYRINGE (ML) INJECTION EVERY 12 HOURS SCHEDULED
Status: DISCONTINUED | OUTPATIENT
Start: 2025-05-01 | End: 2025-05-01 | Stop reason: HOSPADM

## 2025-05-01 RX ORDER — ONDANSETRON 2 MG/ML
INJECTION INTRAMUSCULAR; INTRAVENOUS
Status: DISCONTINUED | OUTPATIENT
Start: 2025-05-01 | End: 2025-05-01 | Stop reason: SDUPTHER

## 2025-05-01 RX ORDER — FENTANYL CITRATE 50 UG/ML
INJECTION, SOLUTION INTRAMUSCULAR; INTRAVENOUS
Status: DISCONTINUED | OUTPATIENT
Start: 2025-05-01 | End: 2025-05-01 | Stop reason: SDUPTHER

## 2025-05-01 RX ORDER — LIDOCAINE HYDROCHLORIDE AND EPINEPHRINE 10; 10 MG/ML; UG/ML
INJECTION, SOLUTION INFILTRATION; PERINEURAL PRN
Status: DISCONTINUED | OUTPATIENT
Start: 2025-05-01 | End: 2025-05-01 | Stop reason: ALTCHOICE

## 2025-05-01 RX ORDER — PROPOFOL 10 MG/ML
INJECTION, EMULSION INTRAVENOUS
Status: DISCONTINUED | OUTPATIENT
Start: 2025-05-01 | End: 2025-05-01 | Stop reason: SDUPTHER

## 2025-05-01 RX ORDER — LABETALOL HYDROCHLORIDE 5 MG/ML
10 INJECTION, SOLUTION INTRAVENOUS
Status: DISCONTINUED | OUTPATIENT
Start: 2025-05-01 | End: 2025-05-01 | Stop reason: HOSPADM

## 2025-05-01 RX ORDER — PROCHLORPERAZINE EDISYLATE 5 MG/ML
5 INJECTION INTRAMUSCULAR; INTRAVENOUS
Status: DISCONTINUED | OUTPATIENT
Start: 2025-05-01 | End: 2025-05-01 | Stop reason: HOSPADM

## 2025-05-01 RX ORDER — TRAMADOL HYDROCHLORIDE 50 MG/1
50 TABLET ORAL EVERY 4 HOURS PRN
Qty: 20 TABLET | Refills: 0 | Status: SHIPPED | OUTPATIENT
Start: 2025-05-01 | End: 2025-05-08

## 2025-05-01 RX ADMIN — KETOROLAC TROMETHAMINE 30 MG: 30 INJECTION, SOLUTION INTRAMUSCULAR at 12:14

## 2025-05-01 RX ADMIN — PROPOFOL 50 MG: 10 INJECTION, EMULSION INTRAVENOUS at 11:57

## 2025-05-01 RX ADMIN — MIDAZOLAM 2 MG: 1 INJECTION INTRAMUSCULAR; INTRAVENOUS at 11:51

## 2025-05-01 RX ADMIN — FENTANYL CITRATE 50 MCG: 50 INJECTION, SOLUTION INTRAMUSCULAR; INTRAVENOUS at 11:57

## 2025-05-01 RX ADMIN — LIDOCAINE HYDROCHLORIDE 50 MG: 10 INJECTION, SOLUTION EPIDURAL; INFILTRATION; INTRACAUDAL; PERINEURAL at 11:57

## 2025-05-01 RX ADMIN — PROPOFOL 100 MCG/KG/MIN: 10 INJECTION, EMULSION INTRAVENOUS at 11:58

## 2025-05-01 RX ADMIN — ONDANSETRON 4 MG: 2 INJECTION, SOLUTION INTRAMUSCULAR; INTRAVENOUS at 12:07

## 2025-05-01 RX ADMIN — FENTANYL CITRATE 50 MCG: 50 INJECTION, SOLUTION INTRAMUSCULAR; INTRAVENOUS at 12:15

## 2025-05-01 RX ADMIN — SODIUM CHLORIDE: 9 INJECTION, SOLUTION INTRAVENOUS at 11:51

## 2025-05-01 RX ADMIN — DEXAMETHASONE SODIUM PHOSPHATE 8 MG: 4 INJECTION INTRA-ARTICULAR; INTRALESIONAL; INTRAMUSCULAR; INTRAVENOUS; SOFT TISSUE at 11:58

## 2025-05-01 ASSESSMENT — PAIN - FUNCTIONAL ASSESSMENT: PAIN_FUNCTIONAL_ASSESSMENT: NONE - DENIES PAIN

## 2025-05-01 NOTE — ANESTHESIA PRE PROCEDURE
Department of Anesthesiology  Preprocedure Note       Name:  Bushra Jarrell   Age:  59 y.o.  :  1965                                          MRN:  [id number]         Date:  2025      Surgeon: Surgeon(s):  Ayden Davenport MD    Procedure: Procedure(s):  RIGHT INDEX FINGER A1 PULLEY RELEASE    Medications prior to admission:   Prior to Admission medications    Medication Sig Start Date End Date Taking? Authorizing Provider   atorvastatin (LIPITOR) 40 MG tablet Take 1 tablet by mouth nightly 24 Yes Мария Dominguez APRN - CNP   amLODIPine (NORVASC) 10 MG tablet Take 1 tablet by mouth daily 24 Yes Мария Dominguez APRN - CNP   lisinopril (PRINIVIL;ZESTRIL) 10 MG tablet Take 1 tablet by mouth daily 24  Yes ProviderAisha MD   cinacalcet (SENSIPAR) 30 MG tablet Take 1 tablet by mouth nightly   Yes ProviderAisha MD   Acetaminophen Extra Strength 500 MG TABS TAKE ONE TABLET BY MOUTH EVERY 6 HOURS IF NEEDED FOR PAIN OR FEVER 25   Мария Dominguez APRN - CNP   hydrocortisone 1 % ointment APPLY TOPICALLY 2 TIMES A DAY 25   Мария Dominguez APRN - CNP   ibuprofen (ADVIL;MOTRIN) 800 MG tablet Take 1 tablet by mouth every 8 hours as needed for Pain 25   Ayden Davenport MD   tamsulosin (FLOMAX) 0.4 MG capsule Take 1 capsule by mouth daily 24   Мария Dominguez APRN - CNP   ondansetron (ZOFRAN ODT) 4 MG disintegrating tablet Take 1 tablet by mouth every 8 hours as needed for Nausea 9/3/24 9/3/25  Мария Dominguez APRN - CNP   magnesium oxide (MAG-OX) 400 (240 Mg) MG tablet Take 1 tablet by mouth daily    ProviderAisha MD       Current medications:    Current Facility-Administered Medications   Medication Dose Route Frequency Provider Last Rate Last Admin    sodium chloride flush 0.9 % injection 5-40 mL  5-40 mL IntraVENous 2 times per day Adrianna Ardon PA-C        sodium chloride flush 0.9 % injection 5-40 mL  5-40 mL IntraVENous PRN Duglas

## 2025-05-01 NOTE — ANESTHESIA POSTPROCEDURE EVALUATION
Department of Anesthesiology  Postprocedure Note    Patient: Bushra Jarrell  MRN: [id number]  YOB: 1965  Date of evaluation: 5/1/2025    Procedure Summary       Date: 05/01/25 Room / Location: [address]    Anesthesia Start: 1152 Anesthesia Stop: 1228    Procedure: RIGHT INDEX FINGER A1 PULLEY RELEASE (Right: Hand) Diagnosis:       Acquired trigger finger of right index finger      (Acquired trigger finger of right index finger [M65.321])    Surgeons: Ayden Davenport MD Responsible Provider: Rahul Booker MD    Anesthesia Type: TIVA ASA Status: 3            Anesthesia Type: No value filed.    Black Phase I: Black Score: 10    Black Phase II:      Anesthesia Post Evaluation    Patient location during evaluation: PACU  Patient participation: complete - patient participated  Level of consciousness: awake and awake and alert  Pain score: 1  Nausea & Vomiting: no nausea and no vomiting  Cardiovascular status: hemodynamically stable and blood pressure returned to baseline  Respiratory status: acceptable  Hydration status: euvolemic  Multimodal analgesia pain management approach  Pain management: adequate    No notable events documented.

## 2025-05-01 NOTE — OP NOTE
Operative Note      Patient: Bushra Jarrell  YOB: 1965  MRN: [id number]    Date of Procedure: 5/1/2025    Pre-Op Diagnosis Codes:      * Acquired trigger finger of right index finger [M65.321]    Post-Op Diagnosis: Same       Procedure(s):  RIGHT INDEX FINGER A1 PULLEY RELEASE    Surgeon(s):  Ayden Davenport MD    Assistant:   Physician Assistant: Adrianna Ardon PA-C    Anesthesia: Monitor Anesthesia Care    Estimated Blood Loss (mL): Minimal    Complications: None    Specimens:   * No specimens in log *    Implants:  * No implants in log *      Drains: * No LDAs found *    Findings:  Infection Present At Time Of Surgery (PATOS) (choose all levels that have infection present):  No infection present  Other Findings: none    Detailed Description of Procedure:   This is a 59 y.o. female  with right index finger trigger finger.  Patient is aware of the risks and benefits of this procedure today and they have elected go ahead with this.  Patient was brought to the operating room placed in the supine position.  We then cleaned with Betadine solution over projected incision areas and injected local anesthetic with epi.  We then prepped and draped the operative extremity in sterile fashion.  Timeout was performed ensuring correct patient correct extremity and correct procedure.    At this time I made an incision over the A1 pulley of the right index finger. I then bluntly dissected down through subcu tissue.  I placed my down curved retractors.  Under direct visualization I sharply incised through the A1 pulley of the right index finger.  I then had the patient make a fist several times, they had good range of motion and no further triggering.  We then closed with 4-0 monofilament subcuticular suture and skin glue.  Sterile dressing was placed over top.    Adrianna rAdon PA-C was a first assistant for this case and was directly involved with each stage of the procedure including positioning, assistance during  the procedure, implant placement if needed, and closure if needed.  There was no senior/qualified orthopedic resident with appropriate level of training available to assist.     Patient was then transferred to recovery in stable condition.    Electronically signed by Ayden Davenport MD on 5/1/2025 at 12:18 PM

## 2025-05-01 NOTE — DISCHARGE INSTRUCTIONS
Activity-activity as tolerated with operative extremity    Dressing-change dressing after 2 days then okay to shower and get wet-NO submerging in water until follow-up    Keep elevated & ice     Call  For:  *Fever>101 or persistent low grade temp. Or other signs/symptoms of infection such as sweats/chills, pus-like or foul smelling drainage, redness warmth or excessive swelling  *Increased pain not controlled with current medications,repositioning,ice or elevation or any other comfort measures at home  *Persistent nausea or vomiting  *Excessive bleeding-saturating through dressing  *Color change in hand/fingers-pale or bluish in color, cold, new numbness or tingling     Activity  You have had anesthesia today  Do not drive, operate heavy equipment, consume alcoholic beverages, or make any important decisions  for 24 hours   If you are taking pain medication: Do not drive or consume alcohol.  Take your time changing positions today. You may feel light headed or dizzy if you move too quickly.   Continue your home medications as ordered by your physician.  Diet   You can eat your normal diet when you feel well. You should start off with bland foods like chicken soup, toast, or yogurt. Then advance as tolerated.  Drink plenty of fluids (unless your doctor tells you not to). Your urine should be very lightly colored without a strong odor.      scopolamine Patch Home Instructions    1.  Remove Scopolamine patch behind ear in 3 days or sooner at your discretion (rec. In 24 hours)  2.  Wash hands before and after removing patch-use tissue to remove  3.  This medication may cause headaches, dry mouth and/or dilated pupils

## 2025-05-14 ENCOUNTER — OFFICE VISIT (OUTPATIENT)
Age: 60
End: 2025-05-14

## 2025-05-14 VITALS — WEIGHT: 180 LBS | HEIGHT: 65 IN | BODY MASS INDEX: 29.99 KG/M2

## 2025-05-14 DIAGNOSIS — Z98.890 S/P TRIGGER FINGER RELEASE: Primary | ICD-10-CM

## 2025-05-14 PROCEDURE — 99024 POSTOP FOLLOW-UP VISIT: CPT

## 2025-05-14 NOTE — PROGRESS NOTES
[facility] Orthopedics & Sports Medicine    [facility] Orthopaedics and Sports Medicine  [address] [place] [address], [address]  [place] [address]    Surgery:    5/1/2025  Right Index Finger A1 Pulley Release - Right    History of Present Illness:    This is a 59 y.o. female who presents to the clinic today for post op follow up for Right Index Finger A1 Pulley Release - Right on 5/1/2025 .  Patient notes that she is doing well postoperative.  She states that her pain is getting better and better on daily basis.  She has no major concerns today.  She presents today for further evaluation and treatment.    Physical Exam:  Right hand: Her incision site is clean, dry, intact there is no seepage or drainage come from the incision site but there is no erythema or on the incision site.  No signs infectious process ongoing.  Does have a little bit of swelling present around the incision site.  There are some scar tissue formation present.    Imaging:  None    Plan:  She is doing very well postoperative.  I did explain to her the general course recovery.  I did teach her some stretching exercises as well as some scar tissue massage.  At this point, I am comfortable seeing her back as needed.  She is aware what to look out for regarding her incision.  If she has any question concerns, she can reach out.  Patient demonstrates understanding and is agreeable with the plan.         Electronically signed by WASHINGTON WILLIAM PA-C on 5/14/2025 at 11:55 AM    Please note that this chart was generated using voice recognition Dragon dictation software. Although every effort was made to ensure the accuracy of this automated transcription, some errors in transcription may have occurred.

## 2025-05-20 ENCOUNTER — TELEPHONE (OUTPATIENT)
Dept: PRIMARY CARE CLINIC | Age: 60
End: 2025-05-20

## 2025-05-21 NOTE — TELEPHONE ENCOUNTER
Please clarify request with patient, I prescribe Zofran back in September for a kidney stone but do not chronically prescribe nausea medication for her

## 2025-05-22 DIAGNOSIS — R11.0 NAUSEA: ICD-10-CM

## 2025-05-22 RX ORDER — PROMETHAZINE HYDROCHLORIDE 25 MG/1
TABLET ORAL
Qty: 20 TABLET | Refills: 0 | OUTPATIENT
Start: 2025-05-22

## 2025-05-29 ENCOUNTER — OFFICE VISIT (OUTPATIENT)
Dept: PRIMARY CARE CLINIC | Age: 60
End: 2025-05-29
Payer: MEDICAID

## 2025-05-29 ENCOUNTER — RESULTS FOLLOW-UP (OUTPATIENT)
Dept: PRIMARY CARE CLINIC | Age: 60
End: 2025-05-29

## 2025-05-29 ENCOUNTER — HOSPITAL ENCOUNTER (OUTPATIENT)
Age: 60
Discharge: HOME | End: 2025-05-29
Payer: MEDICAID

## 2025-05-29 VITALS
OXYGEN SATURATION: 100 % | HEART RATE: 74 BPM | WEIGHT: 180 LBS | TEMPERATURE: 97 F | HEIGHT: 65 IN | DIASTOLIC BLOOD PRESSURE: 87 MMHG | SYSTOLIC BLOOD PRESSURE: 134 MMHG | BODY MASS INDEX: 29.99 KG/M2

## 2025-05-29 DIAGNOSIS — Z12.11 COLON CANCER SCREENING: ICD-10-CM

## 2025-05-29 DIAGNOSIS — E78.5 DYSLIPIDEMIA: ICD-10-CM

## 2025-05-29 DIAGNOSIS — R11.0 CHRONIC NAUSEA: Primary | ICD-10-CM

## 2025-05-29 DIAGNOSIS — I10 ESSENTIAL HYPERTENSION: ICD-10-CM

## 2025-05-29 DIAGNOSIS — Z12.31 ENCOUNTER FOR SCREENING MAMMOGRAM FOR BREAST CANCER: ICD-10-CM

## 2025-05-29 DIAGNOSIS — Z76.0 MEDICATION REFILL: ICD-10-CM

## 2025-05-29 LAB
CHOLEST SERPL-MCNC: 178 MG/DL (ref 0–199)
CHOLESTEROL/HDL RATIO: 2.2
HDLC SERPL-MCNC: 80 MG/DL
LDLC SERPL CALC-MCNC: 78 MG/DL (ref 0–100)
TRIGL SERPL-MCNC: 99 MG/DL
VLDLC SERPL CALC-MCNC: 20 MG/DL (ref 1–30)

## 2025-05-29 PROCEDURE — 3079F DIAST BP 80-89 MM HG: CPT | Performed by: NURSE PRACTITIONER

## 2025-05-29 PROCEDURE — 80061 LIPID PANEL: CPT

## 2025-05-29 PROCEDURE — 99214 OFFICE O/P EST MOD 30 MIN: CPT | Performed by: NURSE PRACTITIONER

## 2025-05-29 PROCEDURE — 3075F SYST BP GE 130 - 139MM HG: CPT | Performed by: NURSE PRACTITIONER

## 2025-05-29 PROCEDURE — 36415 COLL VENOUS BLD VENIPUNCTURE: CPT

## 2025-05-29 RX ORDER — AMLODIPINE BESYLATE 10 MG/1
10 TABLET ORAL DAILY
Qty: 90 TABLET | Refills: 1 | Status: SHIPPED | OUTPATIENT
Start: 2025-05-29 | End: 2026-05-29

## 2025-05-29 RX ORDER — ATORVASTATIN CALCIUM 40 MG/1
40 TABLET, FILM COATED ORAL NIGHTLY
Qty: 90 TABLET | Refills: 1 | Status: SHIPPED | OUTPATIENT
Start: 2025-05-29 | End: 2026-05-29

## 2025-05-29 SDOH — ECONOMIC STABILITY: FOOD INSECURITY: WITHIN THE PAST 12 MONTHS, YOU WORRIED THAT YOUR FOOD WOULD RUN OUT BEFORE YOU GOT MONEY TO BUY MORE.: NEVER TRUE

## 2025-05-29 SDOH — ECONOMIC STABILITY: FOOD INSECURITY: WITHIN THE PAST 12 MONTHS, THE FOOD YOU BOUGHT JUST DIDN'T LAST AND YOU DIDN'T HAVE MONEY TO GET MORE.: NEVER TRUE

## 2025-05-29 ASSESSMENT — PATIENT HEALTH QUESTIONNAIRE - PHQ9
2. FEELING DOWN, DEPRESSED OR HOPELESS: NOT AT ALL
SUM OF ALL RESPONSES TO PHQ QUESTIONS 1-9: 0
1. LITTLE INTEREST OR PLEASURE IN DOING THINGS: NOT AT ALL

## 2025-05-29 NOTE — PROGRESS NOTES
screening.  - Due for a colonoscopy this year, as the last one was in 2018.  - A referral for a colonoscopy will be made.    Follow-up  The patient will follow up in 6 months.    Hemoglobin A1C   Date Value Ref Range Status   08/22/2018 4.8 4.0 - 6.0 % Final        Return in about 6 months (around 11/29/2025) for HTN.  Orders Placed This Encounter   Procedures    KARON Digital Screen Bilateral [[id number]]     Standing Status:   Future     Expected Date:   6/28/2025     Expiration Date:   5/29/2026    Lipid Panel     Standing Status:   Future     Expected Date:   5/29/2025     Expiration Date:   5/29/2026     Is Patient Fasting?/# of Hours:   no    David Ghotra MD, Gastroenterology, [facility]     Referral Priority:   Routine     Referral Type:   Eval and Treat     Referral Reason:   Specialty Services Required     Referred to Provider:   David Oconnell MD     Requested Specialty:   Gastroenterology     Number of Visits Requested:   1     No orders of the defined types were placed in this encounter.      The patient (or guardian, if applicable) and other individuals in attendance with the patient were advised that Artificial Intelligence will be utilized during this visit to record, process the conversation to generate a clinical note, and support improvement of the AI technology. The patient (or guardian, if applicable) and other individuals in attendance at the appointment consented to the use of AI, including the recording.      Reviewed health maintenance, prior labs and imaging.   Patient given educational materials - see patient instructions.    Discussed use, benefit, and side effects of prescribed medications. Barriers to medication compliance addressed.   All patient questions answered.  Pt voiced understanding to plan of care.   Instructed to continue medications as discussed, healthy diet and exercise.    Patient agreed with treatment plan. Follow up as directed below.     This note is created

## 2025-07-18 DIAGNOSIS — L30.9 FACIAL DERMATITIS: ICD-10-CM

## 2025-07-21 RX ORDER — DIAPER,BRIEF,INFANT-TODD,DISP
EACH MISCELLANEOUS
Qty: 28 G | Refills: 0 | Status: SHIPPED | OUTPATIENT
Start: 2025-07-21

## 2025-08-19 ENCOUNTER — OFFICE VISIT (OUTPATIENT)
Dept: PRIMARY CARE CLINIC | Age: 60
End: 2025-08-19
Payer: MEDICAID

## 2025-08-19 ENCOUNTER — HOSPITAL ENCOUNTER (OUTPATIENT)
Age: 60
Setting detail: SPECIMEN
Discharge: HOME | End: 2025-08-19

## 2025-08-19 VITALS
BODY MASS INDEX: 29.62 KG/M2 | HEART RATE: 80 BPM | DIASTOLIC BLOOD PRESSURE: 72 MMHG | WEIGHT: 177.8 LBS | OXYGEN SATURATION: 99 % | SYSTOLIC BLOOD PRESSURE: 118 MMHG | HEIGHT: 65 IN

## 2025-08-19 DIAGNOSIS — Z87.442 HISTORY OF KIDNEY STONES: ICD-10-CM

## 2025-08-19 DIAGNOSIS — R10.9 LEFT FLANK PAIN: ICD-10-CM

## 2025-08-19 DIAGNOSIS — N18.31 STAGE 3A CHRONIC KIDNEY DISEASE (HCC): ICD-10-CM

## 2025-08-19 DIAGNOSIS — Z12.11 COLON CANCER SCREENING: ICD-10-CM

## 2025-08-19 DIAGNOSIS — Z12.31 ENCOUNTER FOR SCREENING MAMMOGRAM FOR MALIGNANT NEOPLASM OF BREAST: ICD-10-CM

## 2025-08-19 DIAGNOSIS — R31.9 HEMATURIA, UNSPECIFIED TYPE: ICD-10-CM

## 2025-08-19 DIAGNOSIS — R10.9 LEFT FLANK PAIN: Primary | ICD-10-CM

## 2025-08-19 LAB
BACTERIA URNS QL MICRO: NORMAL
BILIRUB UR QL STRIP: NEGATIVE
BILIRUBIN, POC: NORMAL
BLOOD URINE, POC: NORMAL
CASTS #/AREA URNS LPF: NORMAL /LPF (ref 0–8)
CLARITY UR: CLEAR
CLARITY, POC: CLEAR
COLOR UR: YELLOW
COLOR, POC: YELLOW
EPI CELLS #/AREA URNS HPF: NORMAL /HPF (ref 0–5)
GLUCOSE UR STRIP-MCNC: NEGATIVE MG/DL
GLUCOSE URINE, POC: NORMAL MG/DL
HGB UR QL STRIP.AUTO: NEGATIVE
KETONES UR STRIP-MCNC: NEGATIVE MG/DL
KETONES, POC: NORMAL MG/DL
LEUKOCYTE EST, POC: NORMAL
LEUKOCYTE ESTERASE UR QL STRIP: NEGATIVE
NITRITE UR QL STRIP: NEGATIVE
NITRITE, POC: NORMAL
PH UR STRIP: 7.5 [PH] (ref 5–8)
PH, POC: 7
PROT UR STRIP-MCNC: ABNORMAL MG/DL
PROTEIN, POC: 100 MG/DL
RBC #/AREA URNS HPF: NORMAL /HPF (ref 0–4)
SP GR UR STRIP: 1.01 (ref 1–1.03)
SPECIFIC GRAVITY, POC: 1.02
UROBILINOGEN UR STRIP-ACNC: NORMAL EU/DL (ref 0–1)
UROBILINOGEN, POC: 0.2 MG/DL
WBC #/AREA URNS HPF: NORMAL /HPF (ref 0–5)

## 2025-08-19 PROCEDURE — 3074F SYST BP LT 130 MM HG: CPT | Performed by: INTERNAL MEDICINE

## 2025-08-19 PROCEDURE — 99204 OFFICE O/P NEW MOD 45 MIN: CPT | Performed by: INTERNAL MEDICINE

## 2025-08-19 PROCEDURE — 3078F DIAST BP <80 MM HG: CPT | Performed by: INTERNAL MEDICINE

## 2025-08-19 PROCEDURE — 81003 URINALYSIS AUTO W/O SCOPE: CPT | Performed by: INTERNAL MEDICINE

## 2025-08-19 RX ORDER — CEPHALEXIN 500 MG/1
500 CAPSULE ORAL 2 TIMES DAILY
Qty: 14 CAPSULE | Refills: 0 | Status: SHIPPED | OUTPATIENT
Start: 2025-08-19 | End: 2025-08-26

## 2025-08-19 SDOH — ECONOMIC STABILITY: FOOD INSECURITY: WITHIN THE PAST 12 MONTHS, YOU WORRIED THAT YOUR FOOD WOULD RUN OUT BEFORE YOU GOT MONEY TO BUY MORE.: NEVER TRUE

## 2025-08-19 SDOH — ECONOMIC STABILITY: FOOD INSECURITY: WITHIN THE PAST 12 MONTHS, THE FOOD YOU BOUGHT JUST DIDN'T LAST AND YOU DIDN'T HAVE MONEY TO GET MORE.: NEVER TRUE

## 2025-08-19 ASSESSMENT — PATIENT HEALTH QUESTIONNAIRE - PHQ9
SUM OF ALL RESPONSES TO PHQ QUESTIONS 1-9: 0
SUM OF ALL RESPONSES TO PHQ QUESTIONS 1-9: 0
2. FEELING DOWN, DEPRESSED OR HOPELESS: NOT AT ALL
1. LITTLE INTEREST OR PLEASURE IN DOING THINGS: NOT AT ALL
SUM OF ALL RESPONSES TO PHQ QUESTIONS 1-9: 0
SUM OF ALL RESPONSES TO PHQ QUESTIONS 1-9: 0

## 2025-08-20 ENCOUNTER — TELEPHONE (OUTPATIENT)
Dept: ORTHOPEDIC SURGERY | Age: 60
End: 2025-08-20

## 2025-08-26 DIAGNOSIS — L30.9 FACIAL DERMATITIS: ICD-10-CM

## 2025-08-26 RX ORDER — DIAPER,BRIEF,INFANT-TODD,DISP
EACH MISCELLANEOUS
Qty: 28 G | Refills: 0 | Status: SHIPPED | OUTPATIENT
Start: 2025-08-26

## 2025-08-29 PROBLEM — Z86.73 HISTORY OF STROKE: Status: ACTIVE | Noted: 2018-08-22

## 2025-09-03 DIAGNOSIS — M25.462 SWELLING OF JOINT, KNEE, LEFT: ICD-10-CM

## 2025-09-03 RX ORDER — PSEUDOEPHED/ACETAMINOPH/DIPHEN 30MG-500MG
TABLET ORAL
Qty: 120 TABLET | Refills: 0 | OUTPATIENT
Start: 2025-09-03

## (undated) DEVICE — SOLUTION IRRIG 1000ML 0.9% SOD CHL USP POUR PLAS BTL

## (undated) DEVICE — SYRINGE MED 10ML LUERLOCK TIP W/O SFTY DISP

## (undated) DEVICE — GLOVE ORTHO 7 1/2   MSG9475

## (undated) DEVICE — GLOVE SURG SZ 65 L12IN THK75MIL DK GRN LTX FREE

## (undated) DEVICE — MHPB HAND AND FOOT PACK: Brand: MEDLINE INDUSTRIES, INC.

## (undated) DEVICE — NEEDLE HYPO 25GA L1.5IN BLU POLYPR HUB S STL REG BVL STR

## (undated) DEVICE — SUTURE MONOCRYL SZ 4-0 L18IN ABSRB UD P-3 L13MM 3/8 CIR PRIM Y494G

## (undated) DEVICE — GLOVE SURG SZ 8 L12IN THK75MIL DK GRN LTX FREE

## (undated) DEVICE — BNDG,ELSTC,MATRIX,STRL,2"X5YD,LF,HOOK&LP: Brand: MEDLINE

## (undated) DEVICE — APPLICATOR MEDICATED 26 CC SOLUTION HI LT ORNG CHLORAPREP

## (undated) DEVICE — GLOVE SURG SZ 65 L12IN FNGR THK126MIL CRM LTX FREE

## (undated) DEVICE — DRAPE,U/ SHT,SPLIT,PLAS,STERIL: Brand: MEDLINE

## (undated) DEVICE — SWABSTICK MEDICATED 10% POVIDONE IOD PVP SGL ANTISEP SAT

## (undated) DEVICE — LIQUIBAND RAPID ADHESIVE 36/CS 0.8ML: Brand: MEDLINE

## (undated) DEVICE — STRAP,POSITIONING,KNEE/BODY,FOAM,4X60": Brand: MEDLINE

## (undated) DEVICE — BLUNTFILL: Brand: MONOJECT

## (undated) DEVICE — CLOTH PRE OP W9XL10.5IN 2% CHG FRAGRANCE RNS FREE READYPREP

## (undated) DEVICE — SOLUTION IRRIG 1000ML 09% SOD CHL USP PIC PLAS CONTAINER